# Patient Record
Sex: MALE | Race: WHITE | NOT HISPANIC OR LATINO | Employment: PART TIME | URBAN - METROPOLITAN AREA
[De-identification: names, ages, dates, MRNs, and addresses within clinical notes are randomized per-mention and may not be internally consistent; named-entity substitution may affect disease eponyms.]

---

## 2017-04-11 PROBLEM — I07.1 TRICUSPID VALVE INSUFFICIENCY: Status: ACTIVE | Noted: 2017-04-11

## 2018-07-11 PROBLEM — I07.1 TRICUSPID VALVE INSUFFICIENCY: Status: RESOLVED | Noted: 2017-04-11 | Resolved: 2018-07-11

## 2018-07-11 PROBLEM — E66.01 SEVERE OBESITY (BMI 35.0-39.9): Status: ACTIVE | Noted: 2018-07-11

## 2018-08-02 PROBLEM — I10 HYPERTENSION: Status: ACTIVE | Noted: 2018-08-02

## 2019-01-11 PROBLEM — I20.89 EQUIVALENT ANGINA: Status: ACTIVE | Noted: 2019-01-11

## 2019-01-11 PROBLEM — I36.1 NON-RHEUMATIC TRICUSPID VALVE INSUFFICIENCY: Status: ACTIVE | Noted: 2017-04-11

## 2019-01-11 PROBLEM — Z01.818 PRE-OP EVALUATION: Status: ACTIVE | Noted: 2019-01-11

## 2019-01-11 PROBLEM — R00.1 BRADYCARDIA: Status: ACTIVE | Noted: 2019-01-11

## 2019-03-14 PROBLEM — H26.9 CATARACT: Status: ACTIVE | Noted: 2019-03-14

## 2019-03-25 PROBLEM — I10 HYPERTENSION: Chronic | Status: ACTIVE | Noted: 2018-08-02

## 2019-03-25 PROBLEM — Z01.818 PRE-OP EVALUATION: Status: RESOLVED | Noted: 2019-01-11 | Resolved: 2019-03-25

## 2019-03-26 PROBLEM — H25.813 COMBINED FORMS OF AGE-RELATED CATARACT OF BOTH EYES: Status: ACTIVE | Noted: 2019-03-26

## 2019-04-24 ENCOUNTER — PATIENT OUTREACH (OUTPATIENT)
Dept: ADMINISTRATIVE | Facility: HOSPITAL | Age: 64
End: 2019-04-24

## 2019-05-07 PROBLEM — Z98.42 HISTORY OF LEFT CATARACT EXTRACTION: Status: ACTIVE | Noted: 2019-05-07

## 2019-08-28 PROBLEM — L98.9 UNKNOWN SKIN LESION: Status: ACTIVE | Noted: 2019-08-28

## 2019-08-28 PROBLEM — E80.1 PCT (PORPHYRIA CUTANEA TARDA): Status: ACTIVE | Noted: 2019-08-28

## 2019-08-28 PROBLEM — B35.1 ONYCHOMYCOSIS DUE TO DERMATOPHYTE: Status: ACTIVE | Noted: 2019-08-28

## 2020-01-03 ENCOUNTER — OFFICE VISIT (OUTPATIENT)
Dept: FAMILY MEDICINE | Facility: CLINIC | Age: 65
End: 2020-01-03
Payer: MEDICARE

## 2020-01-03 VITALS
RESPIRATION RATE: 18 BRPM | TEMPERATURE: 98 F | WEIGHT: 265.63 LBS | DIASTOLIC BLOOD PRESSURE: 78 MMHG | OXYGEN SATURATION: 97 % | SYSTOLIC BLOOD PRESSURE: 120 MMHG | BODY MASS INDEX: 39.34 KG/M2 | HEART RATE: 63 BPM | HEIGHT: 69 IN

## 2020-01-03 DIAGNOSIS — H91.93 BILATERAL HEARING LOSS, UNSPECIFIED HEARING LOSS TYPE: ICD-10-CM

## 2020-01-03 DIAGNOSIS — E78.00 PURE HYPERCHOLESTEROLEMIA: ICD-10-CM

## 2020-01-03 DIAGNOSIS — Z12.5 SCREENING FOR PROSTATE CANCER: ICD-10-CM

## 2020-01-03 DIAGNOSIS — J45.20 MILD INTERMITTENT ASTHMA WITHOUT COMPLICATION: ICD-10-CM

## 2020-01-03 DIAGNOSIS — R73.03 PREDIABETES: ICD-10-CM

## 2020-01-03 DIAGNOSIS — I25.10 CORONARY ARTERY DISEASE INVOLVING NATIVE CORONARY ARTERY OF NATIVE HEART WITHOUT ANGINA PECTORIS: ICD-10-CM

## 2020-01-03 DIAGNOSIS — E80.1 PCT (PORPHYRIA CUTANEA TARDA): ICD-10-CM

## 2020-01-03 DIAGNOSIS — E66.01 SEVERE OBESITY WITH BODY MASS INDEX (BMI) OF 35.0 TO 39.9 WITH SERIOUS COMORBIDITY: ICD-10-CM

## 2020-01-03 DIAGNOSIS — I10 ESSENTIAL HYPERTENSION: Primary | ICD-10-CM

## 2020-01-03 PROBLEM — R00.1 BRADYCARDIA: Status: RESOLVED | Noted: 2019-01-11 | Resolved: 2020-01-03

## 2020-01-03 PROBLEM — H25.813 COMBINED FORMS OF AGE-RELATED CATARACT OF BOTH EYES: Status: RESOLVED | Noted: 2019-03-26 | Resolved: 2020-01-03

## 2020-01-03 PROBLEM — B35.1 ONYCHOMYCOSIS DUE TO DERMATOPHYTE: Status: RESOLVED | Noted: 2019-08-28 | Resolved: 2020-01-03

## 2020-01-03 PROBLEM — L98.9 SKIN LESION OF FACE: Status: RESOLVED | Noted: 2019-08-28 | Resolved: 2020-01-03

## 2020-01-03 PROBLEM — Z98.42 HISTORY OF LEFT CATARACT EXTRACTION: Status: RESOLVED | Noted: 2019-05-07 | Resolved: 2020-01-03

## 2020-01-03 PROBLEM — I20.89 EQUIVALENT ANGINA: Status: RESOLVED | Noted: 2019-01-11 | Resolved: 2020-01-03

## 2020-01-03 PROBLEM — H26.9 CATARACT: Status: RESOLVED | Noted: 2019-03-14 | Resolved: 2020-01-03

## 2020-01-03 PROCEDURE — 3078F PR MOST RECENT DIASTOLIC BLOOD PRESSURE < 80 MM HG: ICD-10-PCS | Mod: HCNC,CPTII,S$GLB, | Performed by: FAMILY MEDICINE

## 2020-01-03 PROCEDURE — 99204 OFFICE O/P NEW MOD 45 MIN: CPT | Mod: HCNC,S$GLB,, | Performed by: FAMILY MEDICINE

## 2020-01-03 PROCEDURE — 3074F SYST BP LT 130 MM HG: CPT | Mod: HCNC,CPTII,S$GLB, | Performed by: FAMILY MEDICINE

## 2020-01-03 PROCEDURE — 99999 PR PBB SHADOW E&M-EST. PATIENT-LVL V: CPT | Mod: PBBFAC,HCNC,, | Performed by: FAMILY MEDICINE

## 2020-01-03 PROCEDURE — 99499 UNLISTED E&M SERVICE: CPT | Mod: HCNC,S$GLB,, | Performed by: FAMILY MEDICINE

## 2020-01-03 PROCEDURE — 3008F PR BODY MASS INDEX (BMI) DOCUMENTED: ICD-10-PCS | Mod: HCNC,CPTII,S$GLB, | Performed by: FAMILY MEDICINE

## 2020-01-03 PROCEDURE — 3008F BODY MASS INDEX DOCD: CPT | Mod: HCNC,CPTII,S$GLB, | Performed by: FAMILY MEDICINE

## 2020-01-03 PROCEDURE — 3074F PR MOST RECENT SYSTOLIC BLOOD PRESSURE < 130 MM HG: ICD-10-PCS | Mod: HCNC,CPTII,S$GLB, | Performed by: FAMILY MEDICINE

## 2020-01-03 PROCEDURE — 3078F DIAST BP <80 MM HG: CPT | Mod: HCNC,CPTII,S$GLB, | Performed by: FAMILY MEDICINE

## 2020-01-03 PROCEDURE — 99999 PR PBB SHADOW E&M-EST. PATIENT-LVL V: ICD-10-PCS | Mod: PBBFAC,HCNC,, | Performed by: FAMILY MEDICINE

## 2020-01-03 PROCEDURE — 99204 PR OFFICE/OUTPT VISIT, NEW, LEVL IV, 45-59 MIN: ICD-10-PCS | Mod: HCNC,S$GLB,, | Performed by: FAMILY MEDICINE

## 2020-01-03 PROCEDURE — 99499 RISK ADDL DX/OHS AUDIT: ICD-10-PCS | Mod: HCNC,S$GLB,, | Performed by: FAMILY MEDICINE

## 2020-01-03 RX ORDER — ATORVASTATIN CALCIUM 80 MG/1
80 TABLET, FILM COATED ORAL NIGHTLY
Qty: 90 TABLET | Refills: 3 | Status: SHIPPED | OUTPATIENT
Start: 2020-01-03 | End: 2020-02-21 | Stop reason: SDUPTHER

## 2020-01-03 RX ORDER — LISINOPRIL AND HYDROCHLOROTHIAZIDE 20; 25 MG/1; MG/1
1 TABLET ORAL DAILY
Qty: 90 TABLET | Refills: 1 | Status: SHIPPED | OUTPATIENT
Start: 2020-01-03 | End: 2020-02-21 | Stop reason: SDUPTHER

## 2020-01-03 NOTE — PROGRESS NOTES
FAMILY MEDICINE    Patient Active Problem List   Diagnosis    Pure hypercholesterolemia    Mild intermittent asthma without complication    GERD (gastroesophageal reflux disease)    Coronary artery disease involving native coronary artery without angina pectoris    Allergic rhinitis    Non-rheumatic tricuspid valve insufficiency    Severe obesity with body mass index (BMI) of 35.0 to 39.9 with serious comorbidity    Essential hypertension    PCT (porphyria cutanea tarda)    Prediabetes    Bilateral hearing loss       CC:   Chief Complaint   Patient presents with    Establish Care       HPI: Jean Shaw Jr. is a 64 y.o. male  - with obesity, CAD (2014 angiogram proximal LAD 20% and LCA OM1 70% stenosis), hypertension, hyperlipidemia, tricuspid regurgitation, asthma, and porphyria cutanea tarda presents to establish care. Last PCP NP Jojo Callaway    Cardiologist: Dr. Berhane Alonso   - reports planning to establish with Dr. Alvarez    1. Hypertension    Current medication treatment:   lisinopril-hydrochlorothiazide (PRINZIDE,ZESTORETIC) 20-25 mg Tab, TAKE 1 TABLET BY MOUTH ONCE DAILY, Disp: 90 tablet, Rfl: 3    Medication side effects: denies  Exercise regimen: none  Dietary treatment: poor compliance    Home BP cuff: none   How often does patient monitoring BP? NA  Last home BP reading: NA     Last 14 day average of home BP reading: NA    2. Hyperlipidemia with CAD LDL goal <70    Current treatment:  atorvastatin (LIPITOR) 80 MG tablet, Take 1 tablet (80 mg total) by mouth every evening., Disp: 90 tablet, Rfl: 3    Side effects from current treatment: denies    Exercise: see above  Diet: see above    Lab Results       Component                Value               Date                       CHOL                     125                 05/06/2019                 CHOL                     109 (L)             01/07/2019                 CHOL                     118 (L)             07/26/2018            Lab  Results       Component                Value               Date                       HDL                      39 (L)              05/06/2019                 HDL                      34 (L)              01/07/2019                 HDL                      37 (L)              07/26/2018            Lab Results       Component                Value               Date                       LDLCALC                  70.8                05/06/2019                 LDLCALC                  63.4                01/07/2019                 LDLCALC                  65.8                07/26/2018            Lab Results       Component                Value               Date                       TRIG                     76                  05/06/2019                 TRIG                     58                  01/07/2019                 TRIG                     76                  07/26/2018            Lab Results       Component                Value               Date                       CHOLHDL                  31.2                05/06/2019                 CHOLHDL                  31.2                01/07/2019                 CHOLHDL                  31.4                07/26/2018                      HEALTH MAINTENANCE:   Health Maintenance   Topic Date Due    PROSTATE-SPECIFIC ANTIGEN  01/07/2020    Lipid Panel  05/06/2020    High Dose Statin  01/03/2021    Aspirin/Antiplatelet Therapy  01/03/2021    Colonoscopy  07/01/2023    TETANUS VACCINE  05/09/2026    Hepatitis C Screening  Completed    Pneumococcal Vaccine (Medium Risk)  Completed       ROS: Review of Systems   Constitutional: Negative.    HENT: Hearing loss: chronic.         Otherwise negative   Eyes: Negative.    Respiratory: Negative.    Cardiovascular: Negative.    Gastrointestinal: Negative.    Endocrine: Negative.    Genitourinary: Negative.    Musculoskeletal: Negative.    Skin: Negative.    Allergic/Immunologic: Negative.    Neurological: Negative.     Hematological: Negative.    Psychiatric/Behavioral: Negative.        ALLERGIES:   Review of patient's allergies indicates:  No Known Allergies    MEDS:     Current Outpatient Medications:     albuterol (PROVENTIL/VENTOLIN HFA) 90 mcg/actuation inhaler, Inhale 2 puffs into the lungs every 6 (six) hours as needed for Wheezing., Disp: 18 g, Rfl: 5    aspirin 81 MG Chew, Take 1 tablet (81 mg total) by mouth once daily., Disp: 1 tablet, Rfl: 0    omeprazole (PRILOSEC) 20 MG capsule, Take 20 mg by mouth once daily., Disp: , Rfl:     triamcinolone (NASACORT) 55 mcg nasal inhaler, 2 sprays by Nasal route 2 (two) times daily., Disp: , Rfl:     atorvastatin (LIPITOR) 80 MG tablet, Take 1 tablet (80 mg total) by mouth every evening., Disp: 90 tablet, Rfl: 3    lisinopril-hydrochlorothiazide (PRINZIDE,ZESTORETIC) 20-25 mg Tab, Take 1 tablet by mouth once daily., Disp: 90 tablet, Rfl: 1    varicella-zoster gE-AS01B, PF, (SHINGRIX, PF,) 50 mcg/0.5 mL injection, Inject 0.5mL IM at 0 and 2-6 months, Disp: 0.5 mL, Rfl: 1    Past Medical History:   Diagnosis Date    Allergy     Asthma     Cataract 3/14/2019    Scheduled for left cataract extraction on 03/26/2019    Combined forms of age-related cataract of both eyes 3/26/2019    Fever blister     GERD (gastroesophageal reflux disease)     History of left cataract extraction 5/7/2019    Hyperlipidemia     Hypertension     Nasal polyps 1/15/2015    Onychomycosis due to dermatophyte 8/28/2019       Past Surgical History:   Procedure Laterality Date    CARDIAC CATHETERIZATION  10/23/14    CATARACT EXTRACTION W/  INTRAOCULAR LENS IMPLANT Left 3/26/2019    Procedure: EXTRACTION, CATARACT, WITH IOL INSERTION;  Surgeon: Wilmar Jo MD;  Location: [facility];  Service: Ophthalmology;  Laterality: Left;    COLONOSCOPY      HERNIA REPAIR  2011    HERNIA REPAIR      HERNIA REPAIR      SKIN BIOPSY         Family History   Problem Relation Age of Onset     "Osteoporosis Mother     Seizures Mother     Dementia Mother     Stroke Mother     Stroke Father     Diabetes Sister     No Known Problems Brother     Stroke Maternal Grandfather     No Known Problems Sister        Social History     Tobacco Use    Smoking status: Never Smoker    Smokeless tobacco: Never Used   Substance Use Topics    Alcohol use: Yes     Alcohol/week: 0.0 standard drinks     Frequency: Never     Binge frequency: Never     Comment: occ wine    Drug use: No       Social History     Social History Narrative    . No children. Self-employed and does construction work. Lives alone       OBJECTIVE:   Vitals:    01/03/20 0926   BP: 120/78   BP Location: Left arm   Patient Position: Sitting   BP Method: Large (Manual)   Pulse: 63   Resp: 18   Temp: 98.1 °F (36.7 °C)   TempSrc: Oral   SpO2: 97%   Weight: 120.5 kg (265 lb 9.6 oz)   Height: 5' 8.5" (1.74 m)     Body mass index is 39.8 kg/m².    Physical Exam   Constitutional: He is oriented to person, place, and time. No distress.   HENT:   Head: Normocephalic and atraumatic.   Right Ear: Tympanic membrane and ear canal normal.   Left Ear: Tympanic membrane and ear canal normal.   Nose: Nose normal.   Mouth/Throat: Uvula is midline, oropharynx is clear and moist and mucous membranes are normal.   Eyes: Pupils are equal, round, and reactive to light. Conjunctivae and EOM are normal. No scleral icterus.   Neck: Trachea normal. Neck supple. Normal carotid pulses and no JVD present. Carotid bruit is not present. No thyromegaly present.   Cardiovascular: Normal rate, regular rhythm, normal heart sounds and intact distal pulses. Exam reveals no gallop and no friction rub.   No murmur heard.  Pulmonary/Chest: Effort normal and breath sounds normal. He has no decreased breath sounds. He has no wheezes. He has no rhonchi. He has no rales.   Abdominal: Soft. Normal appearance and bowel sounds are normal. There is no tenderness.   Musculoskeletal: He " exhibits no edema.   Neurological: He is alert and oriented to person, place, and time.   Skin: Skin is warm. Capillary refill takes less than 2 seconds. No cyanosis. Nails show no clubbing.         Depression Patient Health Questionnaire 1/3/2020 5/8/2019 1/11/2019 8/2/2018 2/23/2018 11/6/2017 3/22/2017   Over the last two weeks how often have you been bothered by little interest or pleasure in doing things 0 0 0 0 0 0 0   Over the last two weeks how often have you been bothered by feeling down, depressed or hopeless 0 0 - 0 0 0 0   PHQ-2 Total Score 0 0 - 0 0 0 0       PERTINENT RESULTS:   10/23/14   Cath lab procedure   C. ANGIOGRAPHIC RESULTS:    DIAGNOSTIC:       Patient has a left dominant coronary artery.        - Left Main Coronary Artery:             The ostial LM is normal. There is MARGARETTE 3 flow.       - Left Anterior Descending Artery:             The proximal LAD has a 20% stenosis. There is MARGARETTE 3 flow. The remaining portion of the vessel has luminal irregularities.       - D1:             The D1 is normal. There is MARGARETTE 3 flow.       - D2:             The D2 is normal. There is MARGARETTE 3 flow.       - Left Circumflex Artery:             The LCX has luminal irregularities. There is MARGARETTE 3 flow.       - OM1:             The OM1 has a 70% stenosis. There is MARGARETTE 3 flow. The remaining portion of the vessel has luminal irregularities.       - Right Coronary Artery:             The mid RCA has a 30% stenosis. There is MARGARETTE 3 flow. The remaining portion of the vessel has luminal irregularities.       - Posterior Lateral Branch:             The PLB has luminal irregularities. There is MARGARETTE 3 flow.       - Posterior Descending Artery:             The PDA has luminal irregularities. There is MARGARETTE 3 flow. The remaining portion of the vessel is of small caliber.      D. SUMMARY:    1. LVEDP=7mmHg  2. 70% OM1 stenosis    E. RECOMMENDATIONS:    1. Routine post-cath care.  2. Maximize medical secondary prevention of CAD.    3. Risk factor reduction.   4. EC ASA 81mg po qday  5. Follow-up with Dr. JAMIL Alonso in 2-4 weeks    I certify that I was present for catheter insertion, catheter manipulation, angiography, and angiographic interpretation of this patient.    This document has been electronically    SIGNED BY: Ham Arizmendi MD On: 10/23/2014 12:21       BMP  Lab Results   Component Value Date     09/05/2019    K 5.0 09/05/2019    CL 98 09/05/2019    CO2 28 09/05/2019    BUN 16 09/05/2019    CREATININE 0.9 09/05/2019    CALCIUM 10.1 09/05/2019    ANIONGAP 10 09/05/2019    ESTGFRAFRICA >60.0 09/05/2019    EGFRNONAA >60.0 09/05/2019     Lab Results   Component Value Date    ALT 40 09/05/2019    AST 27 09/05/2019    ALKPHOS 117 09/05/2019    BILITOT 0.6 09/05/2019     Lab Results   Component Value Date    HGBA1C 5.8 (H) 09/05/2019     Lab Results   Component Value Date    CHOL 125 05/06/2019    CHOL 109 (L) 01/07/2019    CHOL 118 (L) 07/26/2018     Lab Results   Component Value Date    HDL 39 (L) 05/06/2019    HDL 34 (L) 01/07/2019    HDL 37 (L) 07/26/2018     Lab Results   Component Value Date    LDLCALC 70.8 05/06/2019    LDLCALC 63.4 01/07/2019    LDLCALC 65.8 07/26/2018     Lab Results   Component Value Date    TRIG 76 05/06/2019    TRIG 58 01/07/2019    TRIG 76 07/26/2018     Lab Results   Component Value Date    CHOLHDL 31.2 05/06/2019    CHOLHDL 31.2 01/07/2019    CHOLHDL 31.4 07/26/2018       ASSESSMENT/PLAN:  Problem List Items Addressed This Visit        ENT    Bilateral hearing loss    Current Assessment & Plan     - referral to Audiology for evaluation and recommendations         Relevant Orders    Ambulatory Referral to Audiology       Pulmonary    Mild intermittent asthma without complication    Current Assessment & Plan     - no signs of exacerbation            Cardiac/Vascular    Pure hypercholesterolemia    Current Assessment & Plan     Lab Results   Component Value Date    LDLCALC 70.8 05/06/2019     - well  controlled  - continue current medications         Relevant Medications    atorvastatin (LIPITOR) 80 MG tablet    Other Relevant Orders    Comprehensive metabolic panel    Lipid panel    Coronary artery disease involving native coronary artery without angina pectoris    Overview     - CCS: 0  - 10/23/14 Angiogram LVEDP=7mmHg, 70% OM1 stenosis,  RCA has a 30% stenosis, LAD 20% stenosis   - goal LDL <70 on high dose statin  - BP goal < 130/80  - ASA 81 mg daily  - not on Beta-blocker due to bradycardia         Essential hypertension - Primary    Current Assessment & Plan     - well controlled  - continue current medications         Relevant Medications    lisinopril-hydrochlorothiazide (PRINZIDE,ZESTORETIC) 20-25 mg Tab       Endocrine    PCT (porphyria cutanea tarda)    Current Assessment & Plan     - no issues  - followed by Dermatology         Prediabetes    Current Assessment & Plan     Lab Results   Component Value Date    HGBA1C 5.8 (H) 09/05/2019     - discussed recommendation for diet, exercise and weight loss         Relevant Orders    Hemoglobin A1c       Other    Severe obesity with body mass index (BMI) of 35.0 to 39.9 with serious comorbidity    Current Assessment & Plan     - discussed recommendation for diet, exercise and weight loss           Other Visit Diagnoses     Screening for prostate cancer        - pt opts for yearly PSA screening    Relevant Orders    PSA, Screening          ORDERS:   Orders Placed This Encounter    Comprehensive metabolic panel    Lipid panel    Hemoglobin A1c    PSA, Screening    Ambulatory Referral to Audiology    varicella-zoster gE-AS01B, PF, (SHINGRIX, PF,) 50 mcg/0.5 mL injection    lisinopril-hydrochlorothiazide (PRINZIDE,ZESTORETIC) 20-25 mg Tab    atorvastatin (LIPITOR) 80 MG tablet     Vaccines recommended: Shingles vaccine at pharmacy    Follow-up in 3 months with labs.     Dr. Anni Beth D.O.   Family Medicine

## 2020-01-03 NOTE — ASSESSMENT & PLAN NOTE
Lab Results   Component Value Date    HGBA1C 5.8 (H) 09/05/2019     - discussed recommendation for diet, exercise and weight loss

## 2020-01-03 NOTE — ASSESSMENT & PLAN NOTE
Lab Results   Component Value Date    LDLCALC 70.8 05/06/2019     - well controlled  - continue current medications

## 2020-01-08 ENCOUNTER — TELEPHONE (OUTPATIENT)
Dept: OTOLARYNGOLOGY | Facility: CLINIC | Age: 65
End: 2020-01-08

## 2020-01-08 ENCOUNTER — OFFICE VISIT (OUTPATIENT)
Dept: CARDIOLOGY | Facility: CLINIC | Age: 65
End: 2020-01-08
Payer: MEDICARE

## 2020-01-08 VITALS
OXYGEN SATURATION: 98 % | SYSTOLIC BLOOD PRESSURE: 144 MMHG | HEIGHT: 68 IN | DIASTOLIC BLOOD PRESSURE: 80 MMHG | WEIGHT: 265 LBS | HEART RATE: 62 BPM | BODY MASS INDEX: 40.16 KG/M2

## 2020-01-08 DIAGNOSIS — R07.89 ATYPICAL CHEST PAIN: ICD-10-CM

## 2020-01-08 DIAGNOSIS — E78.00 PURE HYPERCHOLESTEROLEMIA: ICD-10-CM

## 2020-01-08 DIAGNOSIS — I25.10 CORONARY ARTERY DISEASE INVOLVING NATIVE CORONARY ARTERY OF NATIVE HEART WITHOUT ANGINA PECTORIS: ICD-10-CM

## 2020-01-08 DIAGNOSIS — I10 ESSENTIAL HYPERTENSION: ICD-10-CM

## 2020-01-08 DIAGNOSIS — R07.89 CHEST DISCOMFORT: ICD-10-CM

## 2020-01-08 DIAGNOSIS — E66.01 SEVERE OBESITY WITH BODY MASS INDEX (BMI) OF 35.0 TO 39.9 WITH SERIOUS COMORBIDITY: ICD-10-CM

## 2020-01-08 PROCEDURE — 3008F PR BODY MASS INDEX (BMI) DOCUMENTED: ICD-10-PCS | Mod: HCNC,CPTII,S$GLB, | Performed by: INTERNAL MEDICINE

## 2020-01-08 PROCEDURE — 99214 PR OFFICE/OUTPT VISIT, EST, LEVL IV, 30-39 MIN: ICD-10-PCS | Mod: HCNC,S$GLB,, | Performed by: INTERNAL MEDICINE

## 2020-01-08 PROCEDURE — 3077F SYST BP >= 140 MM HG: CPT | Mod: HCNC,CPTII,S$GLB, | Performed by: INTERNAL MEDICINE

## 2020-01-08 PROCEDURE — 3077F PR MOST RECENT SYSTOLIC BLOOD PRESSURE >= 140 MM HG: ICD-10-PCS | Mod: HCNC,CPTII,S$GLB, | Performed by: INTERNAL MEDICINE

## 2020-01-08 PROCEDURE — 3079F PR MOST RECENT DIASTOLIC BLOOD PRESSURE 80-89 MM HG: ICD-10-PCS | Mod: HCNC,CPTII,S$GLB, | Performed by: INTERNAL MEDICINE

## 2020-01-08 PROCEDURE — 93005 EKG 12-LEAD: ICD-10-PCS | Mod: HCNC,S$GLB,, | Performed by: INTERNAL MEDICINE

## 2020-01-08 PROCEDURE — 99999 PR PBB SHADOW E&M-EST. PATIENT-LVL III: CPT | Mod: PBBFAC,HCNC,, | Performed by: INTERNAL MEDICINE

## 2020-01-08 PROCEDURE — 99214 OFFICE O/P EST MOD 30 MIN: CPT | Mod: HCNC,S$GLB,, | Performed by: INTERNAL MEDICINE

## 2020-01-08 PROCEDURE — 93005 ELECTROCARDIOGRAM TRACING: CPT | Mod: HCNC,S$GLB,, | Performed by: INTERNAL MEDICINE

## 2020-01-08 PROCEDURE — 3079F DIAST BP 80-89 MM HG: CPT | Mod: HCNC,CPTII,S$GLB, | Performed by: INTERNAL MEDICINE

## 2020-01-08 PROCEDURE — 93010 ELECTROCARDIOGRAM REPORT: CPT | Mod: HCNC,S$GLB,, | Performed by: STUDENT IN AN ORGANIZED HEALTH CARE EDUCATION/TRAINING PROGRAM

## 2020-01-08 PROCEDURE — 93010 EKG 12-LEAD: ICD-10-PCS | Mod: HCNC,S$GLB,, | Performed by: STUDENT IN AN ORGANIZED HEALTH CARE EDUCATION/TRAINING PROGRAM

## 2020-01-08 PROCEDURE — 3008F BODY MASS INDEX DOCD: CPT | Mod: HCNC,CPTII,S$GLB, | Performed by: INTERNAL MEDICINE

## 2020-01-08 PROCEDURE — 99999 PR PBB SHADOW E&M-EST. PATIENT-LVL III: ICD-10-PCS | Mod: PBBFAC,HCNC,, | Performed by: INTERNAL MEDICINE

## 2020-01-08 NOTE — ASSESSMENT & PLAN NOTE
Controlled.  Pt on high intensity statin.  Goal LDL < 70, last LDL 70.8.  - continue medical therapy   - encouraged risk factor and lifestyle modifications

## 2020-01-08 NOTE — ASSESSMENT & PLAN NOTE
BMI 40.  Pt understands health complications a/w obesity and desires to lose weight.    - encouraged increased activity and exercise [id number]/day 5x/week and improved diet (portion control) for weight loss

## 2020-01-08 NOTE — ASSESSMENT & PLAN NOTE
CCS 0.  Pt w/ NYHA Class I functional status.  Compliant w/ medical therapy.  Angiogram in 10/2014 w/ OM1 70%, prox LAD 20% stenosis, and RCA 30% stenosis.    - continue medical therapy for secondary prevention  - encouraged risk factor and lifestyle modifications

## 2020-01-08 NOTE — ASSESSMENT & PLAN NOTE
Pain nonexertional.  Pt not on antianginal however pain is reproducible and pt notes past trauma which he attributes to the current pain.  Most likely musculoskeletal.   - will continue to follow clinically

## 2020-01-08 NOTE — TELEPHONE ENCOUNTER
----- Message from Eileen Parham sent at 1/8/2020  3:42 PM CST -----  Please make patient an appt for hearing test only. Orders placed by Dr. Beth. Thanks

## 2020-01-08 NOTE — ASSESSMENT & PLAN NOTE
Elevated in clinic.  Pt compliant w/ meds.  Goal BP < 130/80.  Pt does not monitor at home.   - continue medical therapy  - pt to monitor BP at home and record, to bring in record to cardiology and PCP appts  - encouraged risk factor and lifestyle modifications

## 2020-01-08 NOTE — PROGRESS NOTES
Subjective:    Patient ID:  Jean Shaw Jr. is a 64 y.o. male who presents for evaluation of Establish Care (chest discomfort)      PCP/Referring: Anni Beth DO     Prior Cardiologist: Dr. Berhane Alonso    HPI  Pt is a 65 yo M w/ PMH of CAD w/ (OM1 70% stenosis per Dr. Arizmendi 10/2014), HTN, HLD, Morbid Obesity w/ BMI 40, and prediabetes w/ hgba1c of 5.8% who presents to [facility] care.  He mentions that he has switched from Dr. Alonso to obtain a cardiologist closer to his home.  He mentions that he last saw Dr. Alonso 1/11/19 for his CAD and was continued on medical therapy for secondary prevention.  He mentions that he has been doing well since his last appt.  He mentions that at times he has some chest pain at night x multiple years which is worsened w/ palpation however not a/w sob nor cp w/ exertion.  He attributes this to a prior injury from when he tore cartilage in his chest.  He denies sob, edema, orthopnea, PND, presyncope, palpitations, LOC, or claudication.  He is compliant w/ meds and denies side effects.  He does not monitor his BP at home but has a BP cuff.  He does not exercise much but mentions that he is active at work by walking a lot and gardening and is only limited by chronic back pain.       Past Medical History:   Diagnosis Date    Allergy     Asthma     Cataract 3/14/2019    Scheduled for left cataract extraction on 03/26/2019    Combined forms of age-related cataract of both eyes 3/26/2019    Fever blister     GERD (gastroesophageal reflux disease)     History of left cataract extraction 5/7/2019    Hyperlipidemia     Hypertension     Nasal polyps 1/15/2015    Onychomycosis due to dermatophyte 8/28/2019     Past Surgical History:   Procedure Laterality Date    CARDIAC CATHETERIZATION  10/23/14    CATARACT EXTRACTION W/  INTRAOCULAR LENS IMPLANT Left 3/26/2019    Procedure: EXTRACTION, CATARACT, WITH IOL INSERTION;  Surgeon: Wilmar Jo MD;  Location: [facility];   Service: Ophthalmology;  Laterality: Left;    COLONOSCOPY      HERNIA REPAIR  2011    HERNIA REPAIR      HERNIA REPAIR      SKIN BIOPSY       Social History     Socioeconomic History    Marital status:      Spouse name: Not on file    Number of children: Not on file    Years of education: Not on file    Highest education level: Not on file   Occupational History    Not on file   Social Needs    Financial resource strain: Not very hard    Food insecurity:     Worry: Never true     Inability: Never true    Transportation needs:     Medical: No     Non-medical: No   Tobacco Use    Smoking status: Never Smoker    Smokeless tobacco: Never Used   Substance and Sexual Activity    Alcohol use: Yes     Alcohol/week: 0.0 standard drinks     Frequency: Never     Binge frequency: Never     Comment: occ wine    Drug use: No    Sexual activity: Not Currently     Partners: Female   Lifestyle    Physical activity:     Days per week: 0 days     Minutes per session: 0 min    Stress: Not at all   Relationships    Social connections:     Talks on phone: More than three times a week     Gets together: Once a week     Attends [employer] service: Not on file     Active member of club or organization: No     Attends meetings of clubs or organizations: Never     Relationship status:    Other Topics Concern    Not on file   Social History Narrative    . No children. Self-employed and does construction work. Lives alone     Family History   Problem Relation Age of Onset    Osteoporosis Mother     Seizures Mother     Dementia Mother     Stroke Mother     Stroke Father     Diabetes Sister     No Known Problems Brother     Stroke Maternal Grandfather     No Known Problems Sister        Review of patient's allergies indicates:  No Known Allergies    Medication List with Changes/Refills   Current Medications    ALBUTEROL (PROVENTIL/VENTOLIN HFA) 90 MCG/ACTUATION INHALER    Inhale 2 puffs into  "the lungs every 6 (six) hours as needed for Wheezing.    ASPIRIN 81 MG CHEW    Take 1 tablet (81 mg total) by mouth once daily.    ATORVASTATIN (LIPITOR) 80 MG TABLET    Take 1 tablet (80 mg total) by mouth every evening.    LISINOPRIL-HYDROCHLOROTHIAZIDE (PRINZIDE,ZESTORETIC) 20-25 MG TAB    Take 1 tablet by mouth once daily.    OMEPRAZOLE (PRILOSEC) 20 MG CAPSULE    Take 20 mg by mouth once daily.    TRIAMCINOLONE (NASACORT) 55 MCG NASAL INHALER    2 sprays by Nasal route 2 (two) times daily.    VARICELLA-ZOSTER GE-AS01B, PF, (SHINGRIX, PF,) 50 MCG/0.5 ML INJECTION    Inject 0.5mL IM at 0 and 2-6 months       Review of Systems   Constitution: Negative for diaphoresis and fever.   HENT: Negative for congestion and hearing loss.    Eyes: Negative for blurred vision and pain.   Cardiovascular: Positive for chest pain. Negative for claudication, dyspnea on exertion, leg swelling, near-syncope, palpitations and syncope.   Respiratory: Negative for shortness of breath and sleep disturbances due to breathing.    Hematologic/Lymphatic: Negative for bleeding problem. Does not bruise/bleed easily.   Skin: Negative for color change and poor wound healing.   Musculoskeletal: Positive for back pain.   Gastrointestinal: Negative for abdominal pain and nausea.   Genitourinary: Negative for bladder incontinence and flank pain.   Neurological: Negative for focal weakness and light-headedness.        Objective:   BP (!) 144/80 (BP Location: Left arm, Patient Position: Sitting, BP Method: X-Large (Manual))   Pulse 62   Ht 5' 8" (1.727 m)   Wt 120.2 kg (265 lb)   SpO2 98%   BMI 40.29 kg/m²    Physical Exam   Constitutional: He is oriented to person, place, and time. He appears well-developed and well-nourished.   HENT:   Head: Normocephalic and atraumatic.   Mouth/Throat: Oropharynx is clear and moist.   Eyes: Pupils are equal, round, and reactive to light. EOM are normal. No scleral icterus.   Neck: Normal range of motion. Neck " supple. No JVD present.   Cardiovascular: Normal rate, regular rhythm, S1 normal, S2 normal and intact distal pulses. Exam reveals no gallop and no friction rub.   No murmur heard.  Pulmonary/Chest: Effort normal and breath sounds normal. No respiratory distress. He has no wheezes. He has no rales. He exhibits tenderness.   L chest wall TTP   Abdominal: Soft. Bowel sounds are normal. He exhibits no distension and no mass. There is no tenderness. There is no rebound.   obese   Musculoskeletal: Normal range of motion. He exhibits no edema or tenderness.   Neurological: He is alert and oriented to person, place, and time. He displays normal reflexes. Coordination normal.   Skin: Skin is warm and dry. He is not diaphoretic. No pallor.   Psychiatric: He has a normal mood and affect. His behavior is normal. Judgment normal.         EC2020- reviewed.  Sinus bradycardia, LAD, possible LVH.    Assessment:       1. Coronary artery disease involving native coronary artery of native heart without angina pectoris    2. Essential hypertension    3. Pure hypercholesterolemia    4. Severe obesity with body mass index (BMI) of 35.0 to 39.9 with serious comorbidity    5. Atypical chest pain    6. Chest discomfort         Plan:         Coronary artery disease involving native coronary artery without angina pectoris  CCS 0.  Pt w/ NYHA Class I functional status.  Compliant w/ medical therapy.  Angiogram in 10/2014 w/ OM1 70%, prox LAD 20% stenosis, and RCA 30% stenosis.    - continue medical therapy for secondary prevention  - encouraged risk factor and lifestyle modifications     Essential hypertension  Elevated in clinic.  Pt compliant w/ meds.  Goal BP < 130/80.  Pt does not monitor at home.   - continue medical therapy  - pt to monitor BP at home and record, to bring in record to cardiology and PCP appts  - encouraged risk factor and lifestyle modifications    Pure hypercholesterolemia  Controlled.  Pt on high intensity  statin.  Goal LDL < 70, last LDL 70.8.  - continue medical therapy   - encouraged risk factor and lifestyle modifications    Severe obesity with body mass index (BMI) of 35.0 to 39.9 with serious comorbidity  BMI 40.  Pt understands health complications a/w obesity and desires to lose weight.    - encouraged increased activity and exercise t78ritv/day 5x/week and improved diet (portion control) for weight loss    Atypical chest pain  Pain nonexertional.  Pt not on antianginal however pain is reproducible and pt notes past trauma which he attributes to the current pain.  Most likely musculoskeletal.   - will continue to follow clinically       Total duration of face to face visit time 30 minutes.  Total time spent counseling greater than fifty percent of total visit time.  Counseling included discussion regarding imaging findings, diagnosis, possibilities, treatment options, risks and benefits.  The patient had many questions regarding the options and long-term effects      Lamont Byers M.D.  Interventional Cardiology

## 2020-01-09 ENCOUNTER — TELEPHONE (OUTPATIENT)
Dept: OTOLARYNGOLOGY | Facility: CLINIC | Age: 65
End: 2020-01-09

## 2020-01-09 NOTE — TELEPHONE ENCOUNTER
----- Message from BENTLEY Rhodes sent at 1/9/2020  8:55 AM CST -----  Schedule for audio only.  Thanks.  ----- Message -----  From: Sahra Brandt LPN  Sent: 1/8/2020   4:23 PM CST  To: BENTLEY Rhodes,  Sending this to you. It says audio only just wanted to double check with you first.  ----- Message -----  From: Eileen Parham  Sent: 1/8/2020   3:42 PM CST  To: [facility] Staff    Please make patient an appt for hearing test only. Orders placed by Dr. Beth. Thanks

## 2020-01-09 NOTE — TELEPHONE ENCOUNTER
Spoke to patient scheduled an appt due to hearing loss with Audiologist on 1/21 at 10:40 AM. Patient was notified of the date and time

## 2020-01-21 ENCOUNTER — CLINICAL SUPPORT (OUTPATIENT)
Dept: OTOLARYNGOLOGY | Facility: CLINIC | Age: 65
End: 2020-01-21
Payer: MEDICARE

## 2020-01-21 DIAGNOSIS — H93.12 TINNITUS, LEFT: ICD-10-CM

## 2020-01-21 DIAGNOSIS — H90.3 SENSORINEURAL HEARING LOSS (SNHL), BILATERAL: Primary | ICD-10-CM

## 2020-01-21 PROCEDURE — 92557 COMPREHENSIVE HEARING TEST: CPT | Mod: HCNC,S$GLB,, | Performed by: AUDIOLOGIST-HEARING AID FITTER

## 2020-01-21 PROCEDURE — 92567 TYMPANOMETRY: CPT | Mod: HCNC,S$GLB,, | Performed by: AUDIOLOGIST-HEARING AID FITTER

## 2020-01-21 PROCEDURE — 92567 PR TYMPA2METRY: ICD-10-PCS | Mod: HCNC,S$GLB,, | Performed by: AUDIOLOGIST-HEARING AID FITTER

## 2020-01-21 PROCEDURE — 92557 PR COMPREHENSIVE HEARING TEST: ICD-10-PCS | Mod: HCNC,S$GLB,, | Performed by: AUDIOLOGIST-HEARING AID FITTER

## 2020-02-21 DIAGNOSIS — E78.00 PURE HYPERCHOLESTEROLEMIA: ICD-10-CM

## 2020-02-21 DIAGNOSIS — I10 ESSENTIAL HYPERTENSION: ICD-10-CM

## 2020-02-21 RX ORDER — ATORVASTATIN CALCIUM 80 MG/1
80 TABLET, FILM COATED ORAL NIGHTLY
Qty: 90 TABLET | Refills: 3 | Status: SHIPPED | OUTPATIENT
Start: 2020-02-21 | End: 2020-07-08 | Stop reason: SDUPTHER

## 2020-02-21 RX ORDER — LISINOPRIL AND HYDROCHLOROTHIAZIDE 20; 25 MG/1; MG/1
1 TABLET ORAL DAILY
Qty: 90 TABLET | Refills: 1 | Status: SHIPPED | OUTPATIENT
Start: 2020-02-21 | End: 2020-07-08 | Stop reason: SDUPTHER

## 2020-02-21 NOTE — TELEPHONE ENCOUNTER
----- Message from Nitish Parham sent at 2/21/2020 12:18 PM CST -----  Contact: [facility]  [employer] called to check the status of the new prescription to be sent to them for the medications listed below.  The patient no longer receives these from his local pharamcy.    Please call [phone] to discuss today.    atorvastatin (LIPITOR) 80 MG tablet  lisinopril-hydrochlorothiazide (PRINZIDE,ZESTORETIC) 20-25 mg Tab

## 2020-03-19 ENCOUNTER — PATIENT OUTREACH (OUTPATIENT)
Dept: ADMINISTRATIVE | Facility: HOSPITAL | Age: 65
End: 2020-03-19

## 2020-03-26 ENCOUNTER — PATIENT MESSAGE (OUTPATIENT)
Dept: FAMILY MEDICINE | Facility: CLINIC | Age: 65
End: 2020-03-26

## 2020-03-26 DIAGNOSIS — K21.9 GASTROESOPHAGEAL REFLUX DISEASE WITHOUT ESOPHAGITIS: Primary | Chronic | ICD-10-CM

## 2020-03-26 DIAGNOSIS — K21.9 GASTROESOPHAGEAL REFLUX DISEASE WITHOUT ESOPHAGITIS: Chronic | ICD-10-CM

## 2020-03-26 RX ORDER — OMEPRAZOLE 20 MG/1
20 CAPSULE, DELAYED RELEASE ORAL DAILY
Qty: 90 CAPSULE | Refills: 1 | Status: SHIPPED | OUTPATIENT
Start: 2020-03-26 | End: 2020-04-01 | Stop reason: SDUPTHER

## 2020-04-01 RX ORDER — OMEPRAZOLE 20 MG/1
20 CAPSULE, DELAYED RELEASE ORAL DAILY
Qty: 90 CAPSULE | Refills: 3 | Status: SHIPPED | OUTPATIENT
Start: 2020-04-01 | End: 2021-03-28

## 2020-04-27 ENCOUNTER — PATIENT MESSAGE (OUTPATIENT)
Dept: FAMILY MEDICINE | Facility: CLINIC | Age: 65
End: 2020-04-27

## 2020-06-25 ENCOUNTER — PATIENT OUTREACH (OUTPATIENT)
Dept: ADMINISTRATIVE | Facility: HOSPITAL | Age: 65
End: 2020-06-25

## 2020-07-08 NOTE — ASSESSMENT & PLAN NOTE
Lab Results   Component Value Date    LDLCALC 69.4 07/02/2020     - well controlled  - continue current medication

## 2020-07-08 NOTE — PROGRESS NOTES
FAMILY MEDICINE    Patient Active Problem List   Diagnosis    Pure hypercholesterolemia    Mild intermittent asthma without complication    GERD (gastroesophageal reflux disease)    Coronary artery disease involving native coronary artery without angina pectoris    Allergic rhinitis    Non-rheumatic tricuspid valve insufficiency    Severe obesity with body mass index (BMI) of 35.0 to 39.9 with serious comorbidity    Essential hypertension    PCT (porphyria cutanea tarda)    Prediabetes    Bilateral hearing loss       CC:   Chief Complaint   Patient presents with    Follow-up     labs       HPI: Jean Shaw Jr. is a 65 y.o. male  - with obesity, CAD (2014 angiogram proximal LAD 20% and LCA OM1 70% stenosis), hypertension, hyperlipidemia, tricuspid regurgitation, asthma, and porphyria cutanea tarda presents to follow-up labs and refill medications    Cardiologist: Dr. Lamont Byers    1. Hypertension    Current medication treatment:   lisinopril-hydrochlorothiazide (PRINZIDE,ZESTORETIC) 20-25 mg Tab, TAKE 1 TABLET BY MOUTH ONCE DAILY, Disp: 90 tablet, Rfl: 3    Medication side effects: denies    Home BP cuff: none     2. Hyperlipidemia with CAD LDL goal <70    Current treatment:  atorvastatin (LIPITOR) 80 MG tablet, Take 1 tablet (80 mg total) by mouth every evening., Disp: 90 tablet, Rfl: 3    Side effects from current treatment: denies    Exercise: see above  Diet: see above    Lab Results       Component                Value               Date                       CHOL                     123                 07/02/2020                 CHOL                     125                 05/06/2019                 CHOL                     109 (L)             01/07/2019            Lab Results       Component                Value               Date                       HDL                      39 (L)              07/02/2020                 HDL                      39 (L)              05/06/2019                  HDL                      34 (L)              01/07/2019            Lab Results       Component                Value               Date                       LDLCALC                  69.4                07/02/2020                 LDLCALC                  70.8                05/06/2019                 LDLCALC                  63.4                01/07/2019            Lab Results       Component                Value               Date                       TRIG                     73                  07/02/2020                 TRIG                     76                  05/06/2019                 TRIG                     58                  01/07/2019            Lab Results       Component                Value               Date                       CHOLHDL                  31.7                07/02/2020                 CHOLHDL                  31.2                05/06/2019                 CHOLHDL                  31.2                01/07/2019                      HEALTH MAINTENANCE:   Health Maintenance   Topic Date Due    Pneumococcal Vaccine (65+ Low/Medium Risk) (1 of 2 - PCV13) 01/16/2020    PROSTATE-SPECIFIC ANTIGEN  07/02/2021    Lipid Panel  07/02/2021    High Dose Statin  07/09/2021    Aspirin/Antiplatelet Therapy  07/09/2021    TETANUS VACCINE  05/09/2026    Hepatitis C Screening  Completed       ROS: Review of Systems   Constitutional: Negative.    HENT: Hearing loss: chronic.         Otherwise negative   Eyes: Negative.    Respiratory: Negative.    Cardiovascular: Negative.    Gastrointestinal: Negative.    Endocrine: Negative.    Genitourinary: Negative.    Musculoskeletal: Negative.    Skin: Negative.    Allergic/Immunologic: Negative.    Neurological: Negative.    Hematological: Negative.    Psychiatric/Behavioral: Negative.        ALLERGIES:   Review of patient's allergies indicates:  No Known Allergies    MEDS:     Current Outpatient Medications:     albuterol (PROVENTIL/VENTOLIN HFA) 90  mcg/actuation inhaler, Inhale 2 puffs into the lungs every 6 (six) hours as needed for Wheezing., Disp: 18 g, Rfl: 5    aspirin 81 MG Chew, Take 1 tablet (81 mg total) by mouth once daily., Disp: 1 tablet, Rfl: 0    omeprazole (PRILOSEC) 20 MG capsule, Take 1 capsule (20 mg total) by mouth once daily., Disp: 90 capsule, Rfl: 3    triamcinolone (NASACORT) 55 mcg nasal inhaler, 2 sprays by Nasal route 2 (two) times daily., Disp: , Rfl:     varicella-zoster [id number], PF, (SHINGRIX, PF,) 50 mcg/0.5 mL injection, Inject 0.5mL IM at 0 and 2-6 months, Disp: 0.5 mL, Rfl: 1    atorvastatin (LIPITOR) 80 MG tablet, Take 1 tablet (80 mg total) by mouth every evening., Disp: 90 tablet, Rfl: 3    lisinopriL-hydrochlorothiazide (PRINZIDE,ZESTORETIC) 20-25 mg Tab, Take 1 tablet by mouth once daily., Disp: 90 tablet, Rfl: 3    Past Medical History:   Diagnosis Date    Allergy     Asthma     Cataract 3/14/2019    Scheduled for left cataract extraction on 03/26/2019    Combined forms of age-related cataract of both eyes 3/26/2019    Fever blister     GERD (gastroesophageal reflux disease)     History of left cataract extraction 5/7/2019    Hyperlipidemia     Hypertension     Nasal polyps 1/15/2015    Onychomycosis due to dermatophyte 8/28/2019       Past Surgical History:   Procedure Laterality Date    CARDIAC CATHETERIZATION  10/23/14    CATARACT EXTRACTION W/  INTRAOCULAR LENS IMPLANT Left 3/26/2019    Procedure: EXTRACTION, CATARACT, WITH IOL INSERTION;  Surgeon: Wilmar Jo MD;  Location: [facility];  Service: Ophthalmology;  Laterality: Left;    COLONOSCOPY      HERNIA REPAIR  2011    HERNIA REPAIR      HERNIA REPAIR      SKIN BIOPSY         Family History   Problem Relation Age of Onset    Osteoporosis Mother     Seizures Mother     Dementia Mother     Stroke Mother     Stroke Father     Diabetes Sister     No Known Problems Brother     Stroke Maternal Grandfather     No Known Problems  "Sister        Social History     Tobacco Use    Smoking status: Never Smoker    Smokeless tobacco: Never Used   Substance Use Topics    Alcohol use: Yes     Alcohol/week: 0.0 standard drinks     Frequency: Never     Binge frequency: Never     Comment: occ wine    Drug use: No       Social History     Social History Narrative    . No children. Self-employed and does construction work. Lives alone       OBJECTIVE:   Vitals:    07/09/20 1034   BP: 130/70   BP Location: Left arm   Patient Position: Sitting   BP Method: X-Large (Manual)   Pulse: 66   Resp: 18   Temp: 98.1 °F (36.7 °C)   TempSrc: Oral   SpO2: 97%   Weight: 118 kg (260 lb 3.2 oz)   Height: 5' 8" (1.727 m)     Body mass index is 39.56 kg/m².    Physical Exam  Constitutional:       General: He is not in acute distress.  Neck:      Musculoskeletal: Neck supple.      Vascular: No carotid bruit.   Cardiovascular:      Rate and Rhythm: Normal rate and regular rhythm.      Heart sounds: Normal heart sounds. No murmur.   Pulmonary:      Effort: Pulmonary effort is normal.      Breath sounds: Normal breath sounds.   Abdominal:      General: Abdomen is protuberant. Bowel sounds are normal. There is no distension.      Palpations: Abdomen is soft. There is no mass.      Tenderness: There is no abdominal tenderness.      Hernia: A hernia is present.       Musculoskeletal:      Right lower leg: No edema.      Left lower leg: No edema.   Lymphadenopathy:      Cervical: No cervical adenopathy.   Skin:     General: Skin is warm.   Neurological:      Mental Status: He is alert.           Depression Patient Health Questionnaire 7/9/2020 1/8/2020 1/3/2020 5/8/2019 1/11/2019 8/2/2018 2/23/2018   Over the last two weeks how often have you been bothered by little interest or pleasure in doing things 0 0 0 0 0 0 0   Over the last two weeks how often have you been bothered by feeling down, depressed or hopeless 0 0 0 0 - 0 0   PHQ-2 Total Score 0 0 0 0 - 0 0 "       PERTINENT RESULTS:   10/23/14   Cath lab procedure   C. ANGIOGRAPHIC RESULTS:    DIAGNOSTIC:       Patient has a left dominant coronary artery.        - Left Main Coronary Artery:             The ostial LM is normal. There is MARGARETTE 3 flow.       - Left Anterior Descending Artery:             The proximal LAD has a 20% stenosis. There is MARGARETTE 3 flow. The remaining portion of the vessel has luminal irregularities.       - D1:             The D1 is normal. There is MARGARETTE 3 flow.       - D2:             The D2 is normal. There is MARGARETTE 3 flow.       - Left Circumflex Artery:             The LCX has luminal irregularities. There is MARGARETTE 3 flow.       - OM1:             The OM1 has a 70% stenosis. There is MARGARETTE 3 flow. The remaining portion of the vessel has luminal irregularities.       - Right Coronary Artery:             The mid RCA has a 30% stenosis. There is MARGARETTE 3 flow. The remaining portion of the vessel has luminal irregularities.       - Posterior Lateral Branch:             The PLB has luminal irregularities. There is MARGARETTE 3 flow.       - Posterior Descending Artery:             The PDA has luminal irregularities. There is MARGARETTE 3 flow. The remaining portion of the vessel is of small caliber.      D. SUMMARY:    1. LVEDP=7mmHg  2. 70% OM1 stenosis    E. RECOMMENDATIONS:    1. Routine post-cath care.  2. Maximize medical secondary prevention of CAD.   3. Risk factor reduction.   4. EC ASA 81mg po qday  5. Follow-up with Dr. JAMIL Alonso in 2-4 weeks    I certify that I was present for catheter insertion, catheter manipulation, angiography, and angiographic interpretation of this patient.    This document has been electronically    SIGNED BY: Ham Arizmendi MD On: 10/23/2014 12:21       No visits with results within 1 Week(s) from this visit.   Latest known visit with results is:   Lab Visit on 07/02/2020   Component Date Value Ref Range Status    Sodium 07/02/2020 136  136 - 145 mmol/L Final    Potassium  07/02/2020 4.3  3.5 - 5.1 mmol/L Final    Chloride 07/02/2020 95  95 - 110 mmol/L Final    CO2 07/02/2020 32* 23 - 29 mmol/L Final    Glucose 07/02/2020 135* 70 - 110 mg/dL Final    BUN, Bld 07/02/2020 13  2 - 20 mg/dL Final    Creatinine 07/02/2020 0.78  0.50 - 1.40 mg/dL Final    Calcium 07/02/2020 9.5  8.7 - 10.5 mg/dL Final    Total Protein 07/02/2020 7.7  6.0 - 8.4 g/dL Final    Albumin 07/02/2020 4.6  3.5 - 5.2 g/dL Final    Total Bilirubin 07/02/2020 0.6  0.1 - 1.0 mg/dL Final    Comment: For infants and newborns, interpretation of results should be based  on gestational age, weight and in agreement with clinical  observations.  Premature Infant recommended reference ranges:  Up to 24 hours.............<8.0 mg/dL  Up to 48 hours............<12.0 mg/dL  3-5 days..................<15.0 mg/dL  6-29 days.................<15.0 mg/dL      Alkaline Phosphatase 07/02/2020 114  38 - 126 U/L Final    AST 07/02/2020 40  15 - 46 U/L Final    ALT 07/02/2020 43  10 - 44 U/L Final    Anion Gap 07/02/2020 9  8 - 16 mmol/L Final    eGFR if African American 07/02/2020 >60.0  >60 mL/min/1.73 m^2 Final    eGFR if non African American 07/02/2020 >60.0  >60 mL/min/1.73 m^2 Final    Comment: Calculation used to obtain the estimated glomerular filtration  rate (eGFR) is the CKD-EPI equation.       Cholesterol 07/02/2020 123  120 - 199 mg/dL Final    Comment: The National Cholesterol Education Program (NCEP) has set the  following guidelines (reference ranges) for Cholesterol:  Optimal.....................<200 mg/dL  Borderline High.............200-239 mg/dL  High........................> or = 240 mg/dL      Triglycerides 07/02/2020 73  30 - 150 mg/dL Final    Comment: The National Cholesterol Education Program (NCEP) has set the  following guidelines (reference values) for triglycerides:  Normal......................<150 mg/dL  Borderline High.............150-199 mg/dL  High........................200-499 mg/dL       HDL 07/02/2020 39* 40 - 75 mg/dL Final    Comment: The National Cholesterol Education Program (NCEP) has set the  following guidelines (reference values) for HDL Cholesterol:  Low...............<40 mg/dL  Optimal...........>60 mg/dL      LDL Cholesterol 07/02/2020 69.4  63.0 - 159.0 mg/dL Final    Comment: The National Cholesterol Education Program (NCEP) has set the  following guidelines (reference values) for LDL Cholesterol:  Optimal.......................<130 mg/dL  Borderline High...............130-159 mg/dL  High..........................160-189 mg/dL  Very High.....................>190 mg/dL      Hdl/Cholesterol Ratio 07/02/2020 31.7  20.0 - 50.0 % Final    Total Cholesterol/HDL Ratio 07/02/2020 3.2  2.0 - 5.0 Final    Non-HDL Cholesterol 07/02/2020 84  mg/dL Final    Comment: Risk category and Non-HDL cholesterol goals:  Coronary heart disease (CHD)or equivalent (10-year risk of CHD >20%):  Non-HDL cholesterol goal     <130 mg/dL  Two or more CHD risk factors and 10-year risk of CHD <= 20%:  Non-HDL cholesterol goal     <160 mg/dL  0 to 1 CHD risk factor:  Non-HDL cholesterol goal     <190 mg/dL      Hemoglobin A1C 07/02/2020 5.9* 4.0 - 5.6 % Final    Comment: ADA Screening Guidelines:  5.7-6.4%  Consistent with prediabetes  >or=6.5%  Consistent with diabetes  High levels of fetal hemoglobin interfere with the HbA1C  assay. Heterozygous hemoglobin variants (HbS, HgC, etc)do  not significantly interfere with this assay.   However, presence of multiple variants may affect accuracy.      Estimated Avg Glucose 07/02/2020 123  68 - 131 mg/dL Final    PSA, SCREEN 07/02/2020 1.9  0.00 - 4.00 ng/mL Final    Comment: PSA Expected levels:  Hormonal Therapy: <0.05 ng/ml  Prostatectomy: <0.01 ng/ml  Radiation Therapy: <1.00 ng/ml           ASSESSMENT/PLAN:  Problem List Items Addressed This Visit        Cardiac/Vascular    Coronary artery disease involving native coronary artery without angina pectoris     Overview     - CCS: 0  - 10/23/14 Angiogram LVEDP=7mmHg, 70% OM1 stenosis,  RCA has a 30% stenosis, LAD 20% stenosis   - goal LDL <70 on high dose statin  - BP goal < 130/80  - ASA 81 mg daily  - not on Beta-blocker due to bradycardia         Essential hypertension - Primary    Current Assessment & Plan     - well controlled  - continue current medications         Relevant Medications    lisinopriL-hydrochlorothiazide (PRINZIDE,ZESTORETIC) 20-25 mg Tab    Other Relevant Orders    Basic metabolic panel    Pure hypercholesterolemia    Current Assessment & Plan     Lab Results   Component Value Date    LDLCALC 69.4 07/02/2020     - well controlled  - continue current medication         Relevant Medications    atorvastatin (LIPITOR) 80 MG tablet       Endocrine    Prediabetes    Overview     - discussed recommendation for diet, exercise and weight loss           Current Assessment & Plan     Lab Results   Component Value Date    HGBA1C 5.9 (H) 07/02/2020              Relevant Orders    Hemoglobin A1C       Other    Severe obesity with body mass index (BMI) of 35.0 to 39.9 with serious comorbidity    Overview     - discussed recommendation for diet, exercise and weight loss               ORDERS:   Orders Placed This Encounter    Pneumococcal Conjugate Vaccine (13 Valent) (IM)    Basic metabolic panel    Hemoglobin A1C    lisinopriL-hydrochlorothiazide (PRINZIDE,ZESTORETIC) 20-25 mg Tab    atorvastatin (LIPITOR) 80 MG tablet     Vaccines recommended: Shingles vaccine at pharmacy    Follow-up in 6 months with labs or sooner with any concerns    Dr. Anni Beth D.O.   Family Medicine

## 2020-07-09 ENCOUNTER — OFFICE VISIT (OUTPATIENT)
Dept: FAMILY MEDICINE | Facility: CLINIC | Age: 65
End: 2020-07-09
Payer: MEDICARE

## 2020-07-09 VITALS
WEIGHT: 260.19 LBS | HEIGHT: 68 IN | DIASTOLIC BLOOD PRESSURE: 70 MMHG | BODY MASS INDEX: 39.43 KG/M2 | HEART RATE: 66 BPM | TEMPERATURE: 98 F | RESPIRATION RATE: 18 BRPM | SYSTOLIC BLOOD PRESSURE: 130 MMHG | OXYGEN SATURATION: 97 %

## 2020-07-09 DIAGNOSIS — I10 ESSENTIAL HYPERTENSION: Primary | ICD-10-CM

## 2020-07-09 DIAGNOSIS — I25.10 CORONARY ARTERY DISEASE INVOLVING NATIVE CORONARY ARTERY OF NATIVE HEART WITHOUT ANGINA PECTORIS: ICD-10-CM

## 2020-07-09 DIAGNOSIS — R73.03 PREDIABETES: ICD-10-CM

## 2020-07-09 DIAGNOSIS — E66.01 SEVERE OBESITY WITH BODY MASS INDEX (BMI) OF 35.0 TO 39.9 WITH SERIOUS COMORBIDITY: ICD-10-CM

## 2020-07-09 DIAGNOSIS — E78.00 PURE HYPERCHOLESTEROLEMIA: ICD-10-CM

## 2020-07-09 PROCEDURE — 99214 OFFICE O/P EST MOD 30 MIN: CPT | Mod: 25,HCNC,S$GLB, | Performed by: FAMILY MEDICINE

## 2020-07-09 PROCEDURE — 90670 PNEUMOCOCCAL CONJUGATE VACCINE 13-VALENT LESS THAN 5YO & GREATER THAN: ICD-10-PCS | Mod: HCNC,S$GLB,, | Performed by: FAMILY MEDICINE

## 2020-07-09 PROCEDURE — 1101F PT FALLS ASSESS-DOCD LE1/YR: CPT | Mod: HCNC,CPTII,S$GLB, | Performed by: FAMILY MEDICINE

## 2020-07-09 PROCEDURE — 90670 PCV13 VACCINE IM: CPT | Mod: HCNC,S$GLB,, | Performed by: FAMILY MEDICINE

## 2020-07-09 PROCEDURE — G0009 PNEUMOCOCCAL CONJUGATE VACCINE 13-VALENT LESS THAN 5YO & GREATER THAN: ICD-10-PCS | Mod: HCNC,S$GLB,, | Performed by: FAMILY MEDICINE

## 2020-07-09 PROCEDURE — 3075F SYST BP GE 130 - 139MM HG: CPT | Mod: HCNC,CPTII,S$GLB, | Performed by: FAMILY MEDICINE

## 2020-07-09 PROCEDURE — 99214 PR OFFICE/OUTPT VISIT, EST, LEVL IV, 30-39 MIN: ICD-10-PCS | Mod: 25,HCNC,S$GLB, | Performed by: FAMILY MEDICINE

## 2020-07-09 PROCEDURE — 3078F PR MOST RECENT DIASTOLIC BLOOD PRESSURE < 80 MM HG: ICD-10-PCS | Mod: HCNC,CPTII,S$GLB, | Performed by: FAMILY MEDICINE

## 2020-07-09 PROCEDURE — 99999 PR PBB SHADOW E&M-EST. PATIENT-LVL IV: CPT | Mod: PBBFAC,HCNC,, | Performed by: FAMILY MEDICINE

## 2020-07-09 PROCEDURE — 3008F PR BODY MASS INDEX (BMI) DOCUMENTED: ICD-10-PCS | Mod: HCNC,CPTII,S$GLB, | Performed by: FAMILY MEDICINE

## 2020-07-09 PROCEDURE — 1101F PR PT FALLS ASSESS DOC 0-1 FALLS W/OUT INJ PAST YR: ICD-10-PCS | Mod: HCNC,CPTII,S$GLB, | Performed by: FAMILY MEDICINE

## 2020-07-09 PROCEDURE — G0009 ADMIN PNEUMOCOCCAL VACCINE: HCPCS | Mod: HCNC,S$GLB,, | Performed by: FAMILY MEDICINE

## 2020-07-09 PROCEDURE — 3078F DIAST BP <80 MM HG: CPT | Mod: HCNC,CPTII,S$GLB, | Performed by: FAMILY MEDICINE

## 2020-07-09 PROCEDURE — 99999 PR PBB SHADOW E&M-EST. PATIENT-LVL IV: ICD-10-PCS | Mod: PBBFAC,HCNC,, | Performed by: FAMILY MEDICINE

## 2020-07-09 PROCEDURE — 3075F PR MOST RECENT SYSTOLIC BLOOD PRESS GE 130-139MM HG: ICD-10-PCS | Mod: HCNC,CPTII,S$GLB, | Performed by: FAMILY MEDICINE

## 2020-07-09 PROCEDURE — 3008F BODY MASS INDEX DOCD: CPT | Mod: HCNC,CPTII,S$GLB, | Performed by: FAMILY MEDICINE

## 2020-07-09 RX ORDER — ATORVASTATIN CALCIUM 80 MG/1
80 TABLET, FILM COATED ORAL NIGHTLY
Qty: 90 TABLET | Refills: 3 | Status: SHIPPED | OUTPATIENT
Start: 2020-07-09 | End: 2021-04-19

## 2020-07-09 RX ORDER — LISINOPRIL AND HYDROCHLOROTHIAZIDE 20; 25 MG/1; MG/1
1 TABLET ORAL DAILY
Qty: 90 TABLET | Refills: 3 | Status: SHIPPED | OUTPATIENT
Start: 2020-07-09 | End: 2021-09-27

## 2020-07-31 ENCOUNTER — OFFICE VISIT (OUTPATIENT)
Dept: CARDIOLOGY | Facility: CLINIC | Age: 65
End: 2020-07-31
Payer: MEDICARE

## 2020-07-31 VITALS
HEART RATE: 63 BPM | SYSTOLIC BLOOD PRESSURE: 110 MMHG | HEIGHT: 68 IN | WEIGHT: 264 LBS | BODY MASS INDEX: 40.01 KG/M2 | OXYGEN SATURATION: 98 % | DIASTOLIC BLOOD PRESSURE: 64 MMHG

## 2020-07-31 DIAGNOSIS — E66.01 SEVERE OBESITY WITH BODY MASS INDEX (BMI) OF 35.0 TO 39.9 WITH SERIOUS COMORBIDITY: ICD-10-CM

## 2020-07-31 DIAGNOSIS — I10 ESSENTIAL HYPERTENSION: ICD-10-CM

## 2020-07-31 DIAGNOSIS — I25.10 CORONARY ARTERY DISEASE INVOLVING NATIVE CORONARY ARTERY OF NATIVE HEART WITHOUT ANGINA PECTORIS: ICD-10-CM

## 2020-07-31 DIAGNOSIS — E78.00 PURE HYPERCHOLESTEROLEMIA: ICD-10-CM

## 2020-07-31 DIAGNOSIS — H26.9 CATARACT OF RIGHT EYE, UNSPECIFIED CATARACT TYPE: Primary | ICD-10-CM

## 2020-07-31 PROCEDURE — 3078F DIAST BP <80 MM HG: CPT | Mod: HCNC,CPTII,S$GLB, | Performed by: INTERNAL MEDICINE

## 2020-07-31 PROCEDURE — 1101F PT FALLS ASSESS-DOCD LE1/YR: CPT | Mod: HCNC,CPTII,S$GLB, | Performed by: INTERNAL MEDICINE

## 2020-07-31 PROCEDURE — 3074F PR MOST RECENT SYSTOLIC BLOOD PRESSURE < 130 MM HG: ICD-10-PCS | Mod: HCNC,CPTII,S$GLB, | Performed by: INTERNAL MEDICINE

## 2020-07-31 PROCEDURE — 99999 PR PBB SHADOW E&M-EST. PATIENT-LVL III: CPT | Mod: PBBFAC,HCNC,, | Performed by: INTERNAL MEDICINE

## 2020-07-31 PROCEDURE — 1101F PR PT FALLS ASSESS DOC 0-1 FALLS W/OUT INJ PAST YR: ICD-10-PCS | Mod: HCNC,CPTII,S$GLB, | Performed by: INTERNAL MEDICINE

## 2020-07-31 PROCEDURE — 3078F PR MOST RECENT DIASTOLIC BLOOD PRESSURE < 80 MM HG: ICD-10-PCS | Mod: HCNC,CPTII,S$GLB, | Performed by: INTERNAL MEDICINE

## 2020-07-31 PROCEDURE — 99999 PR PBB SHADOW E&M-EST. PATIENT-LVL III: ICD-10-PCS | Mod: PBBFAC,HCNC,, | Performed by: INTERNAL MEDICINE

## 2020-07-31 PROCEDURE — 3008F BODY MASS INDEX DOCD: CPT | Mod: HCNC,CPTII,S$GLB, | Performed by: INTERNAL MEDICINE

## 2020-07-31 PROCEDURE — 99214 OFFICE O/P EST MOD 30 MIN: CPT | Mod: HCNC,S$GLB,, | Performed by: INTERNAL MEDICINE

## 2020-07-31 PROCEDURE — 99214 PR OFFICE/OUTPT VISIT, EST, LEVL IV, 30-39 MIN: ICD-10-PCS | Mod: HCNC,S$GLB,, | Performed by: INTERNAL MEDICINE

## 2020-07-31 PROCEDURE — 3074F SYST BP LT 130 MM HG: CPT | Mod: HCNC,CPTII,S$GLB, | Performed by: INTERNAL MEDICINE

## 2020-07-31 PROCEDURE — 3008F PR BODY MASS INDEX (BMI) DOCUMENTED: ICD-10-PCS | Mod: HCNC,CPTII,S$GLB, | Performed by: INTERNAL MEDICINE

## 2020-07-31 NOTE — ASSESSMENT & PLAN NOTE
BMI 40.  Pt understands health complications a/w obesity and desires to lose weight.    - encouraged increased activity and exercise [id number]/day 5x/week and improved diet (portion control) for weight loss

## 2020-07-31 NOTE — ASSESSMENT & PLAN NOTE
Controlled.  Pt on high intensity statin.  Goal LDL < 70, last LDL 69.4 7/2/2020.  - continue medical therapy   - encouraged risk factor and lifestyle modifications

## 2020-07-31 NOTE — ASSESSMENT & PLAN NOTE
Controlled.  Pt compliant w/ meds.  Goal BP < 130/80.  Pt does not monitor at home.   - continue medical therapy  - pt to monitor BP at home and record, to bring in record to cardiology and PCP appts  - encouraged risk factor and lifestyle modifications

## 2020-07-31 NOTE — PROGRESS NOTES
Subjective:    Patient ID:  Jean Shaw Jr. is a 65 y.o. male who presents for follow-up of Follow-up (HTN)      PCP/Referring: Anni Beth DO     Prior Cardiologist: Dr. Berhane Alonso    Pt is a 66 yo M w/ PMH of CAD w/ (OM1 70% stenosis per Dr. Arizmendi 10/2014), HTN, HLD, Morbid Obesity w/ BMI 40, and prediabetes w/ hgba1c of 5.8% who presents for f/u.  He mentions that he has switched from Dr. Alonso to obtain a cardiologist closer to his home.  He mentions that he last saw Dr. Alonso 1/11/19 for his CAD and was continued on medical therapy for secondary prevention.  He mentions that he has been doing well since his last appt however he mentions that he has some blurred vision in his R eye which he attributes to a worsening cataract.  He denies cp, sob, edema, orthopnea, PND, presyncope, palpitations, LOC, or claudication.  He is compliant w/ meds and denies side effects.  He does not monitor his BP at home but has a BP cuff.  He does not exercise much but mentions that he is active at work by walking a lot and gardening and is only limited by chronic back pain.       Past Medical History:   Diagnosis Date    Allergy     Asthma     Cataract 3/14/2019    Scheduled for left cataract extraction on 03/26/2019    Combined forms of age-related cataract of both eyes 3/26/2019    Fever blister     GERD (gastroesophageal reflux disease)     History of left cataract extraction 5/7/2019    Hyperlipidemia     Hypertension     Nasal polyps 1/15/2015    Onychomycosis due to dermatophyte 8/28/2019     Past Surgical History:   Procedure Laterality Date    CARDIAC CATHETERIZATION  10/23/14    CATARACT EXTRACTION W/  INTRAOCULAR LENS IMPLANT Left 3/26/2019    Procedure: EXTRACTION, CATARACT, WITH IOL INSERTION;  Surgeon: Wilmar Jo MD;  Location: [facility];  Service: Ophthalmology;  Laterality: Left;    COLONOSCOPY      HERNIA REPAIR  2011    HERNIA REPAIR      HERNIA REPAIR      SKIN BIOPSY        Social History     Socioeconomic History    Marital status:      Spouse name: Not on file    Number of children: Not on file    Years of education: Not on file    Highest education level: Not on file   Occupational History    Not on file   Social Needs    Financial resource strain: Not very hard    Food insecurity     Worry: Never true     Inability: Never true    Transportation needs     Medical: No     Non-medical: No   Tobacco Use    Smoking status: Never Smoker    Smokeless tobacco: Never Used   Substance and Sexual Activity    Alcohol use: Yes     Alcohol/week: 0.0 standard drinks     Frequency: Never     Binge frequency: Never     Comment: occ wine    Drug use: No    Sexual activity: Not Currently     Partners: Female   Lifestyle    Physical activity     Days per week: 0 days     Minutes per session: 0 min    Stress: Not at all   Relationships    Social connections     Talks on phone: More than three times a week     Gets together: Once a week     Attends Islam service: Not on file     Active member of club or organization: No     Attends meetings of clubs or organizations: Never     Relationship status:    Other Topics Concern    Not on file   Social History Narrative    . No children. Self-employed and does construction work. Lives alone     Family History   Problem Relation Age of Onset    Osteoporosis Mother     Seizures Mother     Dementia Mother     Stroke Mother     Stroke Father     Diabetes Sister     No Known Problems Brother     Stroke Maternal Grandfather     No Known Problems Sister        Review of patient's allergies indicates:  No Known Allergies    Medication List with Changes/Refills   Current Medications    ALBUTEROL (PROVENTIL/VENTOLIN HFA) 90 MCG/ACTUATION INHALER    Inhale 2 puffs into the lungs every 6 (six) hours as needed for Wheezing.    ASPIRIN 81 MG CHEW    Take 1 tablet (81 mg total) by mouth once daily.    ATORVASTATIN  "(LIPITOR) 80 MG TABLET    Take 1 tablet (80 mg total) by mouth every evening.    LISINOPRIL-HYDROCHLOROTHIAZIDE (PRINZIDE,ZESTORETIC) 20-25 MG TAB    Take 1 tablet by mouth once daily.    OMEPRAZOLE (PRILOSEC) 20 MG CAPSULE    Take 1 capsule (20 mg total) by mouth once daily.    TRIAMCINOLONE (NASACORT) 55 MCG NASAL INHALER    2 sprays by Nasal route 2 (two) times daily.    VARICELLA-ZOSTER GE-AS01B, PF, (SHINGRIX, PF,) 50 MCG/0.5 ML INJECTION    Inject 0.5mL IM at 0 and 2-6 months       Review of Systems   Constitution: Negative for diaphoresis and fever.   HENT: Negative for congestion and hearing loss.    Eyes: Negative for blurred vision and pain.   Cardiovascular: Negative for chest pain, claudication, dyspnea on exertion, leg swelling, near-syncope, palpitations and syncope.   Respiratory: Negative for shortness of breath and sleep disturbances due to breathing.    Hematologic/Lymphatic: Negative for bleeding problem. Does not bruise/bleed easily.   Skin: Negative for color change and poor wound healing.   Musculoskeletal: Positive for back pain.   Gastrointestinal: Negative for abdominal pain and nausea.   Genitourinary: Negative for bladder incontinence and flank pain.   Neurological: Negative for focal weakness and light-headedness.        Objective:   /64 (BP Location: Right leg, Patient Position: Sitting, BP Method: X-Large (Manual))   Pulse 63   Ht 5' 8" (1.727 m)   Wt 119.7 kg (264 lb)   SpO2 98%   BMI 40.14 kg/m²    Physical Exam   Constitutional: He is oriented to person, place, and time. He appears well-developed and well-nourished.   HENT:   Head: Normocephalic and atraumatic.   Mouth/Throat: Oropharynx is clear and moist.   Eyes: Pupils are equal, round, and reactive to light. EOM are normal. No scleral icterus.   Neck: Normal range of motion. Neck supple. No JVD present.   Cardiovascular: Normal rate, regular rhythm, S1 normal, S2 normal and intact distal pulses. Exam reveals no gallop " and no friction rub.   No murmur heard.  Pulmonary/Chest: Effort normal and breath sounds normal. No respiratory distress. He has no wheezes. He has no rales. He exhibits no tenderness.   L chest wall TTP   Abdominal: Soft. Bowel sounds are normal. He exhibits no distension and no mass. There is no abdominal tenderness. There is no rebound.   obese   Musculoskeletal: Normal range of motion.         General: No tenderness or edema.   Neurological: He is alert and oriented to person, place, and time. He displays normal reflexes. Coordination normal.   Skin: Skin is warm and dry. He is not diaphoretic. No pallor.   Psychiatric: He has a normal mood and affect. His behavior is normal. Judgment normal.         EC2020- reviewed.  Sinus bradycardia, LAD, possible LVH.    Assessment:       1. Coronary artery disease involving native coronary artery of native heart without angina pectoris    2. Essential hypertension    3. Pure hypercholesterolemia    4. Severe obesity with body mass index (BMI) of 35.0 to 39.9 with serious comorbidity         Plan:         Coronary artery disease involving native coronary artery of native heart without angina pectoris  CCS 0.  Pt w/ NYHA Class I functional status.  Compliant w/ medical therapy.  Angiogram in 10/2014 w/ OM1 70%, prox LAD 20% stenosis, and RCA 30% stenosis.    - continue medical therapy for secondary prevention  - encouraged risk factor and lifestyle modifications     Essential hypertension  Controlled.  Pt compliant w/ meds.  Goal BP < 130/80.  Pt does not monitor at home.   - continue medical therapy  - pt to monitor BP at home and record, to bring in record to cardiology and PCP appts  - encouraged risk factor and lifestyle modifications    Pure hypercholesterolemia  Controlled.  Pt on high intensity statin.  Goal LDL < 70, last LDL 69.4 2020.  - continue medical therapy   - encouraged risk factor and lifestyle modifications    Severe obesity with body mass  index (BMI) of 35.0 to 39.9 with serious comorbidity  BMI 40.  Pt understands health complications a/w obesity and desires to lose weight.    - encouraged increased activity and exercise q68mkwg/day 5x/week and improved diet (portion control) for weight loss      Total duration of face to face visit time 30 minutes.  Total time spent counseling greater than fifty percent of total visit time.  Counseling included discussion regarding imaging findings, diagnosis, possibilities, treatment options, risks and benefits.  The patient had many questions regarding the options and long-term effects      Lamont Byers M.D.  Interventional Cardiology

## 2020-09-17 ENCOUNTER — OFFICE VISIT (OUTPATIENT)
Dept: OPTOMETRY | Facility: CLINIC | Age: 65
End: 2020-09-17
Payer: MEDICARE

## 2020-09-17 DIAGNOSIS — Z96.1 PSEUDOPHAKIA OF LEFT EYE: ICD-10-CM

## 2020-09-17 DIAGNOSIS — H26.9 CATARACT OF RIGHT EYE, UNSPECIFIED CATARACT TYPE: Primary | ICD-10-CM

## 2020-09-17 PROCEDURE — 99999 PR PBB SHADOW E&M-EST. PATIENT-LVL II: CPT | Mod: PBBFAC,HCNC,, | Performed by: OPTOMETRIST

## 2020-09-17 PROCEDURE — 92004 COMPRE OPH EXAM NEW PT 1/>: CPT | Mod: HCNC,S$GLB,, | Performed by: OPTOMETRIST

## 2020-09-17 PROCEDURE — 99999 PR PBB SHADOW E&M-EST. PATIENT-LVL II: ICD-10-PCS | Mod: PBBFAC,HCNC,, | Performed by: OPTOMETRIST

## 2020-09-17 PROCEDURE — 92004 PR EYE EXAM, NEW PATIENT,COMPREHESV: ICD-10-PCS | Mod: HCNC,S$GLB,, | Performed by: OPTOMETRIST

## 2020-09-17 NOTE — LETTER
September 17, 2020      Lamont Byers III, MD  [address] [address]           [place] [facility]  [address]  [place] [address]  Phone: [phone]  Fax: [phone]          Patient: Jean Shaw Jr.   MR Number: [id number]   YOB: 1955   Date of Visit: 9/17/2020       Dear Dr. Lamont Byers III:    Thank you for referring Jean Shaw to me for evaluation. Attached you will find relevant portions of my assessment and plan of care.    If you have questions, please do not hesitate to call me. I look forward to following Jean Shaw along with you.    Sincerely,    My Gerardo, OD    Enclosure  CC:  No Recipients    If you would like to receive this communication electronically, please contact [email or web address] or [phone] to request more information on [employer] Link access.    For providers and/or their staff who would like to refer a patient to [facility], please contact us through our one-stop-shop provider referral line, [facility], at [phone].    If you feel you have received this communication in error or would no longer like to receive these types of communications, please e-mail [email or web address]

## 2020-09-17 NOTE — PROGRESS NOTES
HPI     Pt here for annual visit and to check status of cataract OD  Pt had sx OS cataract removal   Feels like he looking through a fog OD  Feels like OD ready for sx   Patient denies diplopia, headaches, flashes/floaters, pain, and   itching/burning/tearing.    Pt does not use any eye drops.      Last edited by Emma Rollins on 9/17/2020 10:33 AM. (History)            Assessment /Plan     For exam results, see Encounter Report.    Cataract of right eye, unspecified cataract type   Consult for cat eval    Asteroid Hyalosis OS  Pseudophakia of left eye   Stable, monitor    RTC 1 year, sooner PRN

## 2020-10-27 ENCOUNTER — PATIENT OUTREACH (OUTPATIENT)
Dept: ADMINISTRATIVE | Facility: OTHER | Age: 65
End: 2020-10-27

## 2020-10-27 NOTE — PROGRESS NOTES
Requested updates within Care Everywhere.  Patient's chart was reviewed for overdue ANTOINETTE topics.  Immunizations reconciled.    Orders placed:n/a  Tasked appts:n/a  Labs Linked:n/a

## 2020-10-28 ENCOUNTER — OFFICE VISIT (OUTPATIENT)
Dept: OPHTHALMOLOGY | Facility: CLINIC | Age: 65
End: 2020-10-28
Attending: OPHTHALMOLOGY
Payer: MEDICARE

## 2020-10-28 DIAGNOSIS — Z01.818 PRE-OP TESTING: ICD-10-CM

## 2020-10-28 DIAGNOSIS — H25.11 NUCLEAR SCLEROSIS OF RIGHT EYE: Primary | ICD-10-CM

## 2020-10-28 PROCEDURE — 99999 PR PBB SHADOW E&M-EST. PATIENT-LVL III: CPT | Mod: PBBFAC,HCNC,, | Performed by: OPHTHALMOLOGY

## 2020-10-28 PROCEDURE — 92136 OPHTHALMIC BIOMETRY: CPT | Mod: HCNC,RT,S$GLB, | Performed by: OPHTHALMOLOGY

## 2020-10-28 PROCEDURE — 99999 PR PBB SHADOW E&M-EST. PATIENT-LVL III: ICD-10-PCS | Mod: PBBFAC,HCNC,, | Performed by: OPHTHALMOLOGY

## 2020-10-28 PROCEDURE — 92136 IOL MASTER - OU - BOTH EYES: ICD-10-PCS | Mod: HCNC,RT,S$GLB, | Performed by: OPHTHALMOLOGY

## 2020-10-28 PROCEDURE — 92004 COMPRE OPH EXAM NEW PT 1/>: CPT | Mod: HCNC,S$GLB,, | Performed by: OPHTHALMOLOGY

## 2020-10-28 PROCEDURE — 92004 PR EYE EXAM, NEW PATIENT,COMPREHESV: ICD-10-PCS | Mod: HCNC,S$GLB,, | Performed by: OPHTHALMOLOGY

## 2020-10-28 RX ORDER — PHENYLEPHRINE HYDROCHLORIDE 25 MG/ML
1 SOLUTION/ DROPS OPHTHALMIC
Status: CANCELLED | OUTPATIENT
Start: 2020-10-28

## 2020-10-28 RX ORDER — PREDNISOLONE ACETATE-GATIFLOXACIN-BROMFENAC .75; 5; 1 MG/ML; MG/ML; MG/ML
1 SUSPENSION/ DROPS OPHTHALMIC 3 TIMES DAILY
Qty: 5 ML | Refills: 3 | Status: SHIPPED | OUTPATIENT
Start: 2020-10-28 | End: 2020-12-18 | Stop reason: ALTCHOICE

## 2020-10-28 RX ORDER — MOXIFLOXACIN 5 MG/ML
1 SOLUTION/ DROPS OPHTHALMIC
Status: CANCELLED | OUTPATIENT
Start: 2020-10-28

## 2020-10-28 RX ORDER — TROPICAMIDE 10 MG/ML
1 SOLUTION/ DROPS OPHTHALMIC
Status: CANCELLED | OUTPATIENT
Start: 2020-10-28

## 2020-10-28 RX ORDER — SODIUM CHLORIDE 0.9 % (FLUSH) 0.9 %
10 SYRINGE (ML) INJECTION
Status: DISCONTINUED | OUTPATIENT
Start: 2020-10-28 | End: 2023-07-10

## 2020-10-28 RX ORDER — TETRACAINE HYDROCHLORIDE 5 MG/ML
1 SOLUTION OPHTHALMIC
Status: CANCELLED | OUTPATIENT
Start: 2020-10-28

## 2020-10-28 NOTE — LETTER
October 28, 2020      My Gerardo, OD  [address]  [facility] [address]           [place] [address]st [address]  [address]  [facility] [address]  Phone: [phone]  Fax: [phone]          Patient: Jean Shaw Jr.   MR Number: [id number]   YOB: 1955   Date of Visit: 10/28/2020       Dear Dr. My Gerardo:    Thank you for referring Jean Shaw to me for evaluation. Attached you will find relevant portions of my assessment and plan of care.    If you have questions, please do not hesitate to call me. I look forward to following Jean Shaw along with you.    Sincerely,    Gladys Santiago MD    Enclosure  CC:  No Recipients    If you would like to receive this communication electronically, please contact [email or web address] or [phone] to request more information on [employer] Link access.    For providers and/or their staff who would like to refer a patient to [facility], please contact us through our one-stop-shop provider referral line, [facility], at [phone].    If you feel you have received this communication in error or would no longer like to receive these types of communications, please e-mail [email or web address]

## 2020-10-28 NOTE — H&P (VIEW-ONLY)
HPI     DLS: 9/17/20 With Dr. Gerardo    Pt here for Cataract Eval per Dr. Gerardo;  Pt states he had cataract surgery at [facility] a few years ago on his OS.    Meds;  No GTTS      Last edited by Selene Jorge on 10/28/2020  8:21 AM. (History)            Assessment /Plan     For exam results, see Encounter Report.    Nuclear sclerosis of right eye      Visually Significant Cataract: Patient reports decreased vision consistent with the clinical amount of lenticular opacity, which reaches the level of visual significance and affects activities of daily living. Risks, benefits, and alternatives to cataract surgery were discussed and the consent reviewed. IOL options were discussed, including ATIOLs and the associated side effects and additional patient cost associated with them.   IOL Selections:   Right eye  IOL: dcboo17.0     Left eye  IOL: PCIOL    Pt wishes to have right eye done first.

## 2020-10-28 NOTE — PROGRESS NOTES
HPI     DLS: 9/17/20 With Dr. Gerardo    Pt here for Cataract Eval per Dr. Gerardo;  Pt states he had cataract surgery at [facility] a few years ago on his OS.    Meds;  No GTTS      Last edited by Selene Jorge on 10/28/2020  8:21 AM. (History)            Assessment /Plan     For exam results, see Encounter Report.    Nuclear sclerosis of right eye      Visually Significant Cataract: Patient reports decreased vision consistent with the clinical amount of lenticular opacity, which reaches the level of visual significance and affects activities of daily living. Risks, benefits, and alternatives to cataract surgery were discussed and the consent reviewed. IOL options were discussed, including ATIOLs and the associated side effects and additional patient cost associated with them.   IOL Selections:   Right eye  IOL: dcboo17.0     Left eye  IOL: PCIOL    Pt wishes to have right eye done first.

## 2020-10-30 ENCOUNTER — TELEPHONE (OUTPATIENT)
Dept: OPHTHALMOLOGY | Facility: CLINIC | Age: 65
End: 2020-10-30

## 2020-10-30 DIAGNOSIS — H25.11 NUCLEAR SCLEROTIC CATARACT OF RIGHT EYE: Primary | ICD-10-CM

## 2020-11-05 ENCOUNTER — TELEPHONE (OUTPATIENT)
Dept: OPHTHALMOLOGY | Facility: CLINIC | Age: 65
End: 2020-11-05

## 2020-11-05 NOTE — TELEPHONE ENCOUNTER
Patient given arrival time for surgery as 7 am.  Nothing to eat or drink after 9 pm night before.  May have water/gatorade/powerade from 9 pm until leaves house morning of surgery.

## 2020-11-06 ENCOUNTER — LAB VISIT (OUTPATIENT)
Dept: FAMILY MEDICINE | Facility: CLINIC | Age: 65
End: 2020-11-06
Payer: MEDICARE

## 2020-11-06 DIAGNOSIS — Z01.818 PRE-OP TESTING: ICD-10-CM

## 2020-11-06 PROCEDURE — U0003 INFECTIOUS AGENT DETECTION BY NUCLEIC ACID (DNA OR RNA); SEVERE ACUTE RESPIRATORY SYNDROME CORONAVIRUS 2 (SARS-COV-2) (CORONAVIRUS DISEASE [COVID-19]), AMPLIFIED PROBE TECHNIQUE, MAKING USE OF HIGH THROUGHPUT TECHNOLOGIES AS DESCRIBED BY CMS-2020-01-R: HCPCS | Mod: HCNC

## 2020-11-07 LAB — SARS-COV-2 RNA RESP QL NAA+PROBE: NOT DETECTED

## 2020-11-09 ENCOUNTER — HOSPITAL ENCOUNTER (OUTPATIENT)
Facility: OTHER | Age: 65
Discharge: HOME | End: 2020-11-09
Attending: OPHTHALMOLOGY | Admitting: OPHTHALMOLOGY
Payer: MEDICARE

## 2020-11-09 ENCOUNTER — ANESTHESIA EVENT (OUTPATIENT)
Dept: SURGERY | Facility: OTHER | Age: 65
End: 2020-11-09
Payer: MEDICARE

## 2020-11-09 ENCOUNTER — ANESTHESIA (OUTPATIENT)
Dept: SURGERY | Facility: OTHER | Age: 65
End: 2020-11-09
Payer: MEDICARE

## 2020-11-09 VITALS
TEMPERATURE: 98 F | BODY MASS INDEX: 38.95 KG/M2 | DIASTOLIC BLOOD PRESSURE: 65 MMHG | HEART RATE: 60 BPM | RESPIRATION RATE: 16 BRPM | HEIGHT: 69 IN | OXYGEN SATURATION: 95 % | SYSTOLIC BLOOD PRESSURE: 137 MMHG | WEIGHT: 263 LBS

## 2020-11-09 DIAGNOSIS — H25.11 NUCLEAR SCLEROSIS OF RIGHT EYE: ICD-10-CM

## 2020-11-09 PROCEDURE — A4216 STERILE WATER/SALINE, 10 ML: HCPCS | Mod: HCNC | Performed by: NURSE ANESTHETIST, CERTIFIED REGISTERED

## 2020-11-09 PROCEDURE — 37000009 HC ANESTHESIA EA ADD 15 MINS: Mod: HCNC | Performed by: OPHTHALMOLOGY

## 2020-11-09 PROCEDURE — 66984 PR REMOVAL, CATARACT, W/INSRT INTRAOC LENS, W/O ENDO CYCLO: ICD-10-PCS | Mod: HCNC,RT,, | Performed by: OPHTHALMOLOGY

## 2020-11-09 PROCEDURE — 66984 XCAPSL CTRC RMVL W/O ECP: CPT | Mod: HCNC,RT,, | Performed by: OPHTHALMOLOGY

## 2020-11-09 PROCEDURE — 36000706: Mod: HCNC | Performed by: OPHTHALMOLOGY

## 2020-11-09 PROCEDURE — 25000003 PHARM REV CODE 250: Mod: HCNC | Performed by: OPHTHALMOLOGY

## 2020-11-09 PROCEDURE — 25000003 PHARM REV CODE 250: Mod: HCNC | Performed by: NURSE ANESTHETIST, CERTIFIED REGISTERED

## 2020-11-09 PROCEDURE — 36000707: Mod: HCNC | Performed by: OPHTHALMOLOGY

## 2020-11-09 PROCEDURE — 63600175 PHARM REV CODE 636 W HCPCS: Mod: HCNC | Performed by: NURSE ANESTHETIST, CERTIFIED REGISTERED

## 2020-11-09 PROCEDURE — V2632 POST CHMBR INTRAOCULAR LENS: HCPCS | Mod: HCNC | Performed by: OPHTHALMOLOGY

## 2020-11-09 PROCEDURE — 71000015 HC POSTOP RECOV 1ST HR: Mod: HCNC | Performed by: OPHTHALMOLOGY

## 2020-11-09 PROCEDURE — 37000008 HC ANESTHESIA 1ST 15 MINUTES: Mod: HCNC | Performed by: OPHTHALMOLOGY

## 2020-11-09 DEVICE — LENS IOL ITEC PRELOAD 17.0D: Type: IMPLANTABLE DEVICE | Site: EYE | Status: FUNCTIONAL

## 2020-11-09 RX ORDER — ACETAMINOPHEN 325 MG/1
650 TABLET ORAL EVERY 4 HOURS PRN
Status: DISCONTINUED | OUTPATIENT
Start: 2020-11-09 | End: 2020-11-09 | Stop reason: HOSPADM

## 2020-11-09 RX ORDER — MOXIFLOXACIN 5 MG/ML
1 SOLUTION/ DROPS OPHTHALMIC
Status: COMPLETED | OUTPATIENT
Start: 2020-11-09 | End: 2020-11-09

## 2020-11-09 RX ORDER — LIDOCAINE HYDROCHLORIDE 40 MG/ML
INJECTION, SOLUTION RETROBULBAR
Status: DISCONTINUED | OUTPATIENT
Start: 2020-11-09 | End: 2020-11-09 | Stop reason: HOSPADM

## 2020-11-09 RX ORDER — SODIUM CHLORIDE 0.9 % (FLUSH) 0.9 %
SYRINGE (ML) INJECTION
Status: DISCONTINUED | OUTPATIENT
Start: 2020-11-09 | End: 2020-11-09

## 2020-11-09 RX ORDER — TROPICAMIDE 10 MG/ML
1 SOLUTION/ DROPS OPHTHALMIC
Status: COMPLETED | OUTPATIENT
Start: 2020-11-09 | End: 2020-11-09

## 2020-11-09 RX ORDER — TETRACAINE HYDROCHLORIDE 5 MG/ML
1 SOLUTION OPHTHALMIC
Status: COMPLETED | OUTPATIENT
Start: 2020-11-09 | End: 2020-11-09

## 2020-11-09 RX ORDER — MIDAZOLAM HYDROCHLORIDE 1 MG/ML
INJECTION INTRAMUSCULAR; INTRAVENOUS
Status: DISCONTINUED | OUTPATIENT
Start: 2020-11-09 | End: 2020-11-09

## 2020-11-09 RX ORDER — MOXIFLOXACIN 5 MG/ML
SOLUTION/ DROPS OPHTHALMIC
Status: DISCONTINUED | OUTPATIENT
Start: 2020-11-09 | End: 2020-11-09 | Stop reason: HOSPADM

## 2020-11-09 RX ORDER — PROPARACAINE HYDROCHLORIDE 5 MG/ML
1 SOLUTION/ DROPS OPHTHALMIC
Status: DISCONTINUED | OUTPATIENT
Start: 2020-11-09 | End: 2020-11-09 | Stop reason: HOSPADM

## 2020-11-09 RX ORDER — TETRACAINE HYDROCHLORIDE 5 MG/ML
SOLUTION OPHTHALMIC
Status: DISCONTINUED | OUTPATIENT
Start: 2020-11-09 | End: 2020-11-09 | Stop reason: HOSPADM

## 2020-11-09 RX ORDER — PHENYLEPHRINE HYDROCHLORIDE 25 MG/ML
1 SOLUTION/ DROPS OPHTHALMIC
Status: COMPLETED | OUTPATIENT
Start: 2020-11-09 | End: 2020-11-09

## 2020-11-09 RX ADMIN — TETRACAINE HYDROCHLORIDE 1 DROP: 5 SOLUTION OPHTHALMIC at 07:11

## 2020-11-09 RX ADMIN — TROPICAMIDE 1 DROP: 10 SOLUTION/ DROPS OPHTHALMIC at 07:11

## 2020-11-09 RX ADMIN — SODIUM CHLORIDE, PRESERVATIVE FREE 10 ML: 5 INJECTION INTRAVENOUS at 08:11

## 2020-11-09 RX ADMIN — PHENYLEPHRINE HYDROCHLORIDE 1 DROP: 25 SOLUTION/ DROPS OPHTHALMIC at 07:11

## 2020-11-09 RX ADMIN — MOXIFLOXACIN 1 DROP: 5 SOLUTION/ DROPS OPHTHALMIC at 07:11

## 2020-11-09 RX ADMIN — MIDAZOLAM HYDROCHLORIDE 2 MG: 1 INJECTION, SOLUTION INTRAMUSCULAR; INTRAVENOUS at 08:11

## 2020-11-09 RX ADMIN — MOXIFLOXACIN 1 DROP: 5 SOLUTION/ DROPS OPHTHALMIC at 09:11

## 2020-11-09 NOTE — OP NOTE
SURGEON:  Gladys Santiago M.D.    PREOPERATIVE DIAGNOSIS:    Nuclear Sclerotic Cataract Right Eye    POSTOPERATIVE DIAGNOSIS:    Nuclear Sclerotic Cataract Right Eye    PROCEDURES:    Phacoemulsification with  intraocular lens, Right eye (26241)    DATE OF SURGERY: 11/09/2020    IMPLANT: pcboo 17.0    ANESTHESIA:  MAC with topical Lidocaine    COMPLICATIONS:  None    ESTIMATED BLOOD LOSS: None    SPECIMENS: None    INDICATIONS:    The patient has a history of painless progressive visual loss and  difficulty with activities of daily living secondary to cataract formation.  After a thorough discussion of the risks, benefits, and alternatives to cataract surgery, including, but not limited to, the rare risks of infection, retinal detachment, hemorrhage, need for additional surgery, loss of vision, and even loss of the eye, the patient voices understanding and desires to proceed.    DESCRIPTION OF PROCEDURE:    The patients IOL calculations were reviewed, and the lens selection confirmed.   After verification and marking of the proper eye in the preop holding area, the patient was brought to the operating room in supine position where the eye was prepped and draped in standard sterile fashion with 5% Betadine and a lid speculum placed in the eye.   Topical 4% Lidocaine was used in addition to the preoperative anesthesia and the procedure was begun by the creation of a paracentesis incision through which viscoelastic was used to fill the anterior chamber.  Next, a keratome blade was used to create a triplanar temporal clear corneal incision and a cystotome and Utrata forceps used to fashion a continuous curvilinear capsulorrhexis.  Hydrodissection was carried out using the Her hydrodissection cannula and the nucleus was found to be mobile.  Phacoemulsification of the nucleus was carried out using a quick chop technique, and all remaining epinuclear and cortical material was removed.  The eye was then reformed with  Viscoelastic and the  intraocular lens was implanted into the capsular bag.  All remaining viscoelastics were removed from the eye and at the end of the case the pupil was round, the lens was well-centered within the capsular bag and all wounds were found to be water tight.  Drops of Vigamox and Pred Forte were instilled and a shield was placed over the eye. The patient will follow up with Dr. Santiago in the morning.

## 2020-11-09 NOTE — ANESTHESIA POSTPROCEDURE EVALUATION
Anesthesia Post Evaluation    Patient: Jean Shaw JrSoledad    Procedure(s) Performed: Procedure(s) (LRB):  EXTRACTION, CATARACT, WITH IOL INSERTION (Right)    Final Anesthesia Type: MAC    Patient location during evaluation: [facility]  Patient participation: Yes- Able to Participate  Level of consciousness: awake and alert, awake and oriented  Post-procedure vital signs: reviewed and stable  Pain management: adequate  Airway patency: patent    PONV status at discharge: No PONV  Anesthetic complications: no      Cardiovascular status: blood pressure returned to baseline, hemodynamically stable and stable  Respiratory status: spontaneous ventilation, unassisted and room air  Hydration status: euvolemic  Follow-up not needed.          Vitals Value Taken Time   /85 11/09/20 0702   Temp 36.6 °C (97.9 °F) 11/09/20 0702   Pulse 59 11/09/20 0702   Resp 20 11/09/20 0702   SpO2 98 % 11/09/20 0702         No case tracking events are documented in the log.      Pain/Adelaida Score: No data recorded

## 2020-11-09 NOTE — PLAN OF CARE
Jean Shaw Jr. has met all discharge criteria from Phase II. Vital Signs are stable, ambulating  without difficulty. Discharge instructions given, patient verbalized understanding. Discharged from facility via wheelchair in stable condition.

## 2020-11-09 NOTE — DISCHARGE SUMMARY
Outcome: Successful outpatient ophthalmic surgical procedure  Preprinted Instructions given to patient.  Regular diet.  Activity: No restrictions  Meds: see Med Rec  Condition: stable  Follow up: 1 day with Dr Santiago  Disposition: Home  Diagnosis: s/p eye surgery

## 2020-11-09 NOTE — ANESTHESIA PREPROCEDURE EVALUATION
11/09/2020  Jean Shaw Jr. is a 65 y.o., male.    Anesthesia Evaluation    I have reviewed the Patient Summary Reports.    I have reviewed the Nursing Notes. I have reviewed the NPO Status.   I have reviewed the Medications.     Review of Systems  Anesthesia Hx:  No problems with previous Anesthesia  Denies Family Hx of Anesthesia complications.   Denies Personal Hx of Anesthesia complications.   Social:  Non-Smoker    Hematology/Oncology:  Hematology Normal   Oncology Normal     EENT/Dental:  EENT/Dental Normal  Ears General/Symptom(s) Hearing Impairment: hearing-aid left, hearing-aid right    Cardiovascular:   Exercise tolerance: poor Hypertension CAD      Pulmonary:   Asthma mild    Renal/:  Renal/ Normal     Hepatic/GI:   GERD    Musculoskeletal:  Musculoskeletal Normal    Neurological:  Neurology Normal    Endocrine:  Endocrine Normal    Dermatological:  Skin Normal    Psych:  Psychiatric Normal           Physical Exam  General:  Morbid Obesity    Airway/Jaw/Neck:  Airway Findings: Mouth Opening: Normal Tongue: Normal  General Airway Assessment: Adult  Mallampati: I  TM Distance: Normal, at least 6 cm  Jaw/Neck Findings:  Neck ROM: Normal ROM      Dental:  Dental Findings: In tact             Anesthesia Plan  Type of Anesthesia, risks & benefits discussed:  Anesthesia Type:  MAC  Patient's Preference:   Intra-op Monitoring Plan: standard ASA monitors  Intra-op Monitoring Plan Comments:   Post Op Pain Control Plan: per primary service following discharge from PACU  Post Op Pain Control Plan Comments:   Induction:    Beta Blocker:         Informed Consent: Patient understands risks and agrees with Anesthesia plan.  Questions answered. Anesthesia consent signed with patient.  ASA Score: 3     Day of Surgery Review of History & Physical:    H&P update referred to the surgeon.         Ready For  Surgery From Anesthesia Perspective.

## 2020-11-09 NOTE — DISCHARGE INSTRUCTIONS
Anesthesia: Monitored Anesthesia Care (MAC)  Anesthesia safety  Tips for anesthesia safety include the following:   · Follow all instructions you are given for how long not to eat or drink before your procedure.  · Be sure your healthcare provider knows what medicines you take, especially any anti-inflammatory medicine or blood thinners. This includes aspirin and any other over-the-counter medicines, herbs, and supplements.  · Have an adult family member or friend drive you home after the procedure.  · For the first 24 hours after your surgery:  ¨ Do not drive or use heavy equipment.  ¨ Do not make important decisions or sign documents.  ¨ Avoid alcohol.  ¨ Have someone stay with you, if possible. They can watch for problems and help keep you safe.  Date Last Reviewed: 12/1/2016 © 2000-2017 [employer] [place]. [address] [place] [address], [place] [address]. All rights reserved. This information is not intended as a substitute for professional medical care. Always follow your healthcare professional's instructions.

## 2020-11-10 ENCOUNTER — OFFICE VISIT (OUTPATIENT)
Dept: OPHTHALMOLOGY | Facility: CLINIC | Age: 65
End: 2020-11-10
Attending: OPHTHALMOLOGY
Payer: MEDICARE

## 2020-11-10 DIAGNOSIS — H25.13 NUCLEAR SCLEROSIS, BILATERAL: ICD-10-CM

## 2020-11-10 DIAGNOSIS — Z98.890 POSTOPERATIVE STATE: Primary | ICD-10-CM

## 2020-11-10 PROCEDURE — 99024 PR POST-OP FOLLOW-UP VISIT: ICD-10-PCS | Mod: HCNC,S$GLB,, | Performed by: OPHTHALMOLOGY

## 2020-11-10 PROCEDURE — 99024 POSTOP FOLLOW-UP VISIT: CPT | Mod: HCNC,S$GLB,, | Performed by: OPHTHALMOLOGY

## 2020-11-10 PROCEDURE — 99999 PR PBB SHADOW E&M-EST. PATIENT-LVL III: ICD-10-PCS | Mod: PBBFAC,HCNC,, | Performed by: OPHTHALMOLOGY

## 2020-11-10 PROCEDURE — 99999 PR PBB SHADOW E&M-EST. PATIENT-LVL III: CPT | Mod: PBBFAC,HCNC,, | Performed by: OPHTHALMOLOGY

## 2020-11-10 NOTE — PROGRESS NOTES
HPI     POD 1 Cataract ext with IOL OD     Pt reports happy with his vision , no eye pain   Using gtts as instructed OD       Gtts  PGB TID OD     Ocular hx   Pseudo OS 03/26/2019    Last edited by Monica Schaefer on 11/10/2020 10:04 AM. (History)            Assessment /Plan     For exam results, see Encounter Report.    Postoperative state    Nuclear sclerosis, bilateral      Slit lamp exam:  L/L: nl  K: clear, wound sealed  AC: 1+ cell  Lens: IOL centered and stable    POD1 s/p Phaco/IOL  Appropriate precautions and post op medications reviewed.  Patient instructed to call or come in if symptoms of redness, decreased vision, or pain are experienced.

## 2020-12-18 ENCOUNTER — OFFICE VISIT (OUTPATIENT)
Dept: OPTOMETRY | Facility: CLINIC | Age: 65
End: 2020-12-18
Payer: MEDICARE

## 2020-12-18 DIAGNOSIS — Z98.41 STATUS POST RIGHT CATARACT EXTRACTION: Primary | ICD-10-CM

## 2020-12-18 PROCEDURE — 99999 PR PBB SHADOW E&M-EST. PATIENT-LVL II: CPT | Mod: PBBFAC,HCNC,, | Performed by: OPTOMETRIST

## 2020-12-18 PROCEDURE — 1126F PR PAIN SEVERITY QUANTIFIED, NO PAIN PRESENT: ICD-10-PCS | Mod: HCNC,S$GLB,, | Performed by: OPTOMETRIST

## 2020-12-18 PROCEDURE — 1101F PT FALLS ASSESS-DOCD LE1/YR: CPT | Mod: HCNC,CPTII,S$GLB, | Performed by: OPTOMETRIST

## 2020-12-18 PROCEDURE — 1101F PR PT FALLS ASSESS DOC 0-1 FALLS W/OUT INJ PAST YR: ICD-10-PCS | Mod: HCNC,CPTII,S$GLB, | Performed by: OPTOMETRIST

## 2020-12-18 PROCEDURE — 99024 PR POST-OP FOLLOW-UP VISIT: ICD-10-PCS | Mod: HCNC,S$GLB,, | Performed by: OPTOMETRIST

## 2020-12-18 PROCEDURE — 99024 POSTOP FOLLOW-UP VISIT: CPT | Mod: HCNC,S$GLB,, | Performed by: OPTOMETRIST

## 2020-12-18 PROCEDURE — 1126F AMNT PAIN NOTED NONE PRSNT: CPT | Mod: HCNC,S$GLB,, | Performed by: OPTOMETRIST

## 2020-12-18 PROCEDURE — 3288F FALL RISK ASSESSMENT DOCD: CPT | Mod: HCNC,CPTII,S$GLB, | Performed by: OPTOMETRIST

## 2020-12-18 PROCEDURE — 3288F PR FALLS RISK ASSESSMENT DOCUMENTED: ICD-10-PCS | Mod: HCNC,CPTII,S$GLB, | Performed by: OPTOMETRIST

## 2020-12-18 PROCEDURE — 99999 PR PBB SHADOW E&M-EST. PATIENT-LVL II: ICD-10-PCS | Mod: PBBFAC,HCNC,, | Performed by: OPTOMETRIST

## 2020-12-18 NOTE — PROGRESS NOTES
HPI     Post-op Evaluation      Additional comments: 5 wk phaco OD              Comments     Last eye exam was 11/10/20 with Dr. Santiago.  Patient happy with vision since cataract sx. Uses OTC readers for small   print.  Patient denies diplopia, headaches, flashes/floaters, and pain.    11/09/2020 IMPLANT: pcboo 17.0 OD  03/26/2019 Phaco OS          Last edited by Emma Wheeler on 12/18/2020  8:26 AM. (History)            Assessment /Plan     For exam results, see Encounter Report.    Status post right cataract extraction  -     OCT- Retina            1.  Pt doing well.  No rx given.  Continue with OTC readers.  No CME OU  Retina flat and intact OU--no holes, tears, breaks, or RDs.  Return care to Dr. Gerardo.

## 2021-01-11 ENCOUNTER — PATIENT MESSAGE (OUTPATIENT)
Dept: FAMILY MEDICINE | Facility: CLINIC | Age: 66
End: 2021-01-11

## 2021-01-11 ENCOUNTER — OFFICE VISIT (OUTPATIENT)
Dept: FAMILY MEDICINE | Facility: CLINIC | Age: 66
End: 2021-01-11
Payer: MEDICARE

## 2021-01-11 VITALS
OXYGEN SATURATION: 98 % | HEIGHT: 69 IN | SYSTOLIC BLOOD PRESSURE: 132 MMHG | DIASTOLIC BLOOD PRESSURE: 80 MMHG | WEIGHT: 270 LBS | HEART RATE: 74 BPM | BODY MASS INDEX: 39.99 KG/M2

## 2021-01-11 DIAGNOSIS — E80.1 PCT (PORPHYRIA CUTANEA TARDA): ICD-10-CM

## 2021-01-11 DIAGNOSIS — I25.10 CORONARY ARTERY DISEASE INVOLVING NATIVE CORONARY ARTERY OF NATIVE HEART WITHOUT ANGINA PECTORIS: ICD-10-CM

## 2021-01-11 DIAGNOSIS — J45.21 MILD INTERMITTENT ASTHMA WITH ACUTE EXACERBATION: Primary | ICD-10-CM

## 2021-01-11 DIAGNOSIS — R73.03 PREDIABETES: ICD-10-CM

## 2021-01-11 DIAGNOSIS — Z12.5 SCREENING FOR PROSTATE CANCER: ICD-10-CM

## 2021-01-11 DIAGNOSIS — E66.01 SEVERE OBESITY WITH BODY MASS INDEX (BMI) OF 35.0 TO 39.9 WITH SERIOUS COMORBIDITY: ICD-10-CM

## 2021-01-11 DIAGNOSIS — I10 ESSENTIAL HYPERTENSION: ICD-10-CM

## 2021-01-11 DIAGNOSIS — E78.00 PURE HYPERCHOLESTEROLEMIA: ICD-10-CM

## 2021-01-11 PROCEDURE — 1101F PR PT FALLS ASSESS DOC 0-1 FALLS W/OUT INJ PAST YR: ICD-10-PCS | Mod: HCNC,CPTII,S$GLB, | Performed by: FAMILY MEDICINE

## 2021-01-11 PROCEDURE — 3075F SYST BP GE 130 - 139MM HG: CPT | Mod: HCNC,CPTII,S$GLB, | Performed by: FAMILY MEDICINE

## 2021-01-11 PROCEDURE — 1126F PR PAIN SEVERITY QUANTIFIED, NO PAIN PRESENT: ICD-10-PCS | Mod: HCNC,S$GLB,, | Performed by: FAMILY MEDICINE

## 2021-01-11 PROCEDURE — 3288F FALL RISK ASSESSMENT DOCD: CPT | Mod: HCNC,CPTII,S$GLB, | Performed by: FAMILY MEDICINE

## 2021-01-11 PROCEDURE — 99499 UNLISTED E&M SERVICE: CPT | Mod: HCNC,S$GLB,, | Performed by: FAMILY MEDICINE

## 2021-01-11 PROCEDURE — 99499 RISK ADDL DX/OHS AUDIT: ICD-10-PCS | Mod: HCNC,S$GLB,, | Performed by: FAMILY MEDICINE

## 2021-01-11 PROCEDURE — 3008F BODY MASS INDEX DOCD: CPT | Mod: HCNC,CPTII,S$GLB, | Performed by: FAMILY MEDICINE

## 2021-01-11 PROCEDURE — 96372 THER/PROPH/DIAG INJ SC/IM: CPT | Mod: HCNC,S$GLB,, | Performed by: FAMILY MEDICINE

## 2021-01-11 PROCEDURE — 3008F PR BODY MASS INDEX (BMI) DOCUMENTED: ICD-10-PCS | Mod: HCNC,CPTII,S$GLB, | Performed by: FAMILY MEDICINE

## 2021-01-11 PROCEDURE — 99999 PR PBB SHADOW E&M-EST. PATIENT-LVL IV: CPT | Mod: PBBFAC,HCNC,, | Performed by: FAMILY MEDICINE

## 2021-01-11 PROCEDURE — 99999 PR PBB SHADOW E&M-EST. PATIENT-LVL IV: ICD-10-PCS | Mod: PBBFAC,HCNC,, | Performed by: FAMILY MEDICINE

## 2021-01-11 PROCEDURE — 99214 PR OFFICE/OUTPT VISIT, EST, LEVL IV, 30-39 MIN: ICD-10-PCS | Mod: HCNC,25,S$GLB, | Performed by: FAMILY MEDICINE

## 2021-01-11 PROCEDURE — 3075F PR MOST RECENT SYSTOLIC BLOOD PRESS GE 130-139MM HG: ICD-10-PCS | Mod: HCNC,CPTII,S$GLB, | Performed by: FAMILY MEDICINE

## 2021-01-11 PROCEDURE — 3079F PR MOST RECENT DIASTOLIC BLOOD PRESSURE 80-89 MM HG: ICD-10-PCS | Mod: HCNC,CPTII,S$GLB, | Performed by: FAMILY MEDICINE

## 2021-01-11 PROCEDURE — 96372 PR INJECTION,THERAP/PROPH/DIAG2ST, IM OR SUBCUT: ICD-10-PCS | Mod: HCNC,S$GLB,, | Performed by: FAMILY MEDICINE

## 2021-01-11 PROCEDURE — 1126F AMNT PAIN NOTED NONE PRSNT: CPT | Mod: HCNC,S$GLB,, | Performed by: FAMILY MEDICINE

## 2021-01-11 PROCEDURE — 1101F PT FALLS ASSESS-DOCD LE1/YR: CPT | Mod: HCNC,CPTII,S$GLB, | Performed by: FAMILY MEDICINE

## 2021-01-11 PROCEDURE — 3288F PR FALLS RISK ASSESSMENT DOCUMENTED: ICD-10-PCS | Mod: HCNC,CPTII,S$GLB, | Performed by: FAMILY MEDICINE

## 2021-01-11 PROCEDURE — 99214 OFFICE O/P EST MOD 30 MIN: CPT | Mod: HCNC,25,S$GLB, | Performed by: FAMILY MEDICINE

## 2021-01-11 PROCEDURE — 3079F DIAST BP 80-89 MM HG: CPT | Mod: HCNC,CPTII,S$GLB, | Performed by: FAMILY MEDICINE

## 2021-01-11 RX ORDER — ALBUTEROL SULFATE 90 UG/1
2 AEROSOL, METERED RESPIRATORY (INHALATION) EVERY 6 HOURS PRN
Qty: 18 G | Refills: 5 | Status: SHIPPED | OUTPATIENT
Start: 2021-01-11 | End: 2022-01-20 | Stop reason: SDUPTHER

## 2021-01-11 RX ORDER — BETAMETHASONE SODIUM PHOSPHATE AND BETAMETHASONE ACETATE 3; 3 MG/ML; MG/ML
6 INJECTION, SUSPENSION INTRA-ARTICULAR; INTRALESIONAL; INTRAMUSCULAR; SOFT TISSUE
Status: COMPLETED | OUTPATIENT
Start: 2021-01-11 | End: 2021-01-11

## 2021-01-11 RX ADMIN — BETAMETHASONE SODIUM PHOSPHATE AND BETAMETHASONE ACETATE 6 MG: 3; 3 INJECTION, SUSPENSION INTRA-ARTICULAR; INTRALESIONAL; INTRAMUSCULAR; SOFT TISSUE at 08:01

## 2021-03-10 ENCOUNTER — PATIENT MESSAGE (OUTPATIENT)
Dept: FAMILY MEDICINE | Facility: CLINIC | Age: 66
End: 2021-03-10

## 2021-05-06 ENCOUNTER — PATIENT MESSAGE (OUTPATIENT)
Dept: RESEARCH | Facility: HOSPITAL | Age: 66
End: 2021-05-06

## 2021-05-25 ENCOUNTER — OFFICE VISIT (OUTPATIENT)
Dept: URGENT CARE | Facility: CLINIC | Age: 66
End: 2021-05-25
Payer: MEDICARE

## 2021-05-25 VITALS
SYSTOLIC BLOOD PRESSURE: 139 MMHG | HEART RATE: 89 BPM | BODY MASS INDEX: 38.36 KG/M2 | HEIGHT: 69 IN | DIASTOLIC BLOOD PRESSURE: 65 MMHG | WEIGHT: 259 LBS | TEMPERATURE: 98 F | OXYGEN SATURATION: 96 % | RESPIRATION RATE: 18 BRPM

## 2021-05-25 DIAGNOSIS — J06.9 VIRAL URI WITH COUGH: Primary | ICD-10-CM

## 2021-05-25 PROCEDURE — 99214 PR OFFICE/OUTPT VISIT, EST, LEVL IV, 30-39 MIN: ICD-10-PCS | Mod: S$GLB,,, | Performed by: NURSE PRACTITIONER

## 2021-05-25 PROCEDURE — 3008F BODY MASS INDEX DOCD: CPT | Mod: CPTII,S$GLB,, | Performed by: NURSE PRACTITIONER

## 2021-05-25 PROCEDURE — 3008F PR BODY MASS INDEX (BMI) DOCUMENTED: ICD-10-PCS | Mod: CPTII,S$GLB,, | Performed by: NURSE PRACTITIONER

## 2021-05-25 PROCEDURE — 99214 OFFICE O/P EST MOD 30 MIN: CPT | Mod: S$GLB,,, | Performed by: NURSE PRACTITIONER

## 2021-06-01 LAB — GASTROCULT GAST QL: NEGATIVE

## 2021-06-30 ENCOUNTER — OFFICE VISIT (OUTPATIENT)
Dept: FAMILY MEDICINE | Facility: CLINIC | Age: 66
End: 2021-06-30
Payer: MEDICARE

## 2021-06-30 ENCOUNTER — TELEPHONE (OUTPATIENT)
Dept: FAMILY MEDICINE | Facility: CLINIC | Age: 66
End: 2021-06-30

## 2021-06-30 VITALS
TEMPERATURE: 99 F | OXYGEN SATURATION: 98 % | HEIGHT: 69 IN | DIASTOLIC BLOOD PRESSURE: 70 MMHG | WEIGHT: 265 LBS | HEART RATE: 67 BPM | SYSTOLIC BLOOD PRESSURE: 124 MMHG | RESPIRATION RATE: 18 BRPM | BODY MASS INDEX: 39.25 KG/M2

## 2021-06-30 DIAGNOSIS — M25.561 ACUTE PAIN OF RIGHT KNEE: Primary | ICD-10-CM

## 2021-06-30 PROCEDURE — 1101F PR PT FALLS ASSESS DOC 0-1 FALLS W/OUT INJ PAST YR: ICD-10-PCS | Mod: CPTII,S$GLB,, | Performed by: FAMILY MEDICINE

## 2021-06-30 PROCEDURE — 3008F BODY MASS INDEX DOCD: CPT | Mod: CPTII,S$GLB,, | Performed by: FAMILY MEDICINE

## 2021-06-30 PROCEDURE — 3288F PR FALLS RISK ASSESSMENT DOCUMENTED: ICD-10-PCS | Mod: CPTII,S$GLB,, | Performed by: FAMILY MEDICINE

## 2021-06-30 PROCEDURE — 1159F PR MEDICATION LIST DOCUMENTED IN MEDICAL RECORD: ICD-10-PCS | Mod: S$GLB,,, | Performed by: FAMILY MEDICINE

## 2021-06-30 PROCEDURE — 99214 OFFICE O/P EST MOD 30 MIN: CPT | Mod: S$GLB,,, | Performed by: FAMILY MEDICINE

## 2021-06-30 PROCEDURE — 99214 PR OFFICE/OUTPT VISIT, EST, LEVL IV, 30-39 MIN: ICD-10-PCS | Mod: S$GLB,,, | Performed by: FAMILY MEDICINE

## 2021-06-30 PROCEDURE — 99999 PR PBB SHADOW E&M-EST. PATIENT-LVL IV: ICD-10-PCS | Mod: PBBFAC,,, | Performed by: FAMILY MEDICINE

## 2021-06-30 PROCEDURE — 1101F PT FALLS ASSESS-DOCD LE1/YR: CPT | Mod: CPTII,S$GLB,, | Performed by: FAMILY MEDICINE

## 2021-06-30 PROCEDURE — 99999 PR PBB SHADOW E&M-EST. PATIENT-LVL IV: CPT | Mod: PBBFAC,,, | Performed by: FAMILY MEDICINE

## 2021-06-30 PROCEDURE — 3288F FALL RISK ASSESSMENT DOCD: CPT | Mod: CPTII,S$GLB,, | Performed by: FAMILY MEDICINE

## 2021-06-30 PROCEDURE — 1125F PR PAIN SEVERITY QUANTIFIED, PAIN PRESENT: ICD-10-PCS | Mod: S$GLB,,, | Performed by: FAMILY MEDICINE

## 2021-06-30 PROCEDURE — 1159F MED LIST DOCD IN RCRD: CPT | Mod: S$GLB,,, | Performed by: FAMILY MEDICINE

## 2021-06-30 PROCEDURE — 3008F PR BODY MASS INDEX (BMI) DOCUMENTED: ICD-10-PCS | Mod: CPTII,S$GLB,, | Performed by: FAMILY MEDICINE

## 2021-06-30 PROCEDURE — 1125F AMNT PAIN NOTED PAIN PRSNT: CPT | Mod: S$GLB,,, | Performed by: FAMILY MEDICINE

## 2021-06-30 RX ORDER — DICLOFENAC SODIUM 10 MG/G
2 GEL TOPICAL 4 TIMES DAILY PRN
Qty: 100 G | Refills: 0 | Status: SHIPPED | OUTPATIENT
Start: 2021-06-30 | End: 2022-02-02 | Stop reason: SDUPTHER

## 2021-07-01 ENCOUNTER — TELEPHONE (OUTPATIENT)
Dept: FAMILY MEDICINE | Facility: CLINIC | Age: 66
End: 2021-07-01

## 2021-07-06 ENCOUNTER — PATIENT OUTREACH (OUTPATIENT)
Dept: ADMINISTRATIVE | Facility: HOSPITAL | Age: 66
End: 2021-07-06

## 2021-07-06 ENCOUNTER — TELEPHONE (OUTPATIENT)
Dept: FAMILY MEDICINE | Facility: CLINIC | Age: 66
End: 2021-07-06

## 2021-07-07 ENCOUNTER — TELEPHONE (OUTPATIENT)
Dept: FAMILY MEDICINE | Facility: CLINIC | Age: 66
End: 2021-07-07

## 2021-07-13 ENCOUNTER — TELEPHONE (OUTPATIENT)
Dept: FAMILY MEDICINE | Facility: CLINIC | Age: 66
End: 2021-07-13

## 2021-07-20 ENCOUNTER — OFFICE VISIT (OUTPATIENT)
Dept: FAMILY MEDICINE | Facility: CLINIC | Age: 66
End: 2021-07-20
Payer: MEDICARE

## 2021-07-20 VITALS
HEART RATE: 60 BPM | WEIGHT: 264 LBS | OXYGEN SATURATION: 98 % | SYSTOLIC BLOOD PRESSURE: 124 MMHG | HEIGHT: 69 IN | DIASTOLIC BLOOD PRESSURE: 72 MMHG | BODY MASS INDEX: 39.1 KG/M2 | TEMPERATURE: 98 F | RESPIRATION RATE: 18 BRPM

## 2021-07-20 DIAGNOSIS — E66.01 SEVERE OBESITY WITH BODY MASS INDEX (BMI) OF 35.0 TO 39.9 WITH SERIOUS COMORBIDITY: ICD-10-CM

## 2021-07-20 DIAGNOSIS — R73.03 PREDIABETES: ICD-10-CM

## 2021-07-20 DIAGNOSIS — J45.20 MILD INTERMITTENT ASTHMA WITHOUT COMPLICATION: ICD-10-CM

## 2021-07-20 DIAGNOSIS — R79.89 ELEVATED LFTS: ICD-10-CM

## 2021-07-20 DIAGNOSIS — E78.00 PURE HYPERCHOLESTEROLEMIA: ICD-10-CM

## 2021-07-20 DIAGNOSIS — I10 ESSENTIAL HYPERTENSION: Primary | ICD-10-CM

## 2021-07-20 DIAGNOSIS — I25.10 CORONARY ARTERY DISEASE INVOLVING NATIVE CORONARY ARTERY OF NATIVE HEART WITHOUT ANGINA PECTORIS: ICD-10-CM

## 2021-07-20 PROCEDURE — 99214 PR OFFICE/OUTPT VISIT, EST, LEVL IV, 30-39 MIN: ICD-10-PCS | Mod: S$GLB,,, | Performed by: FAMILY MEDICINE

## 2021-07-20 PROCEDURE — 99499 UNLISTED E&M SERVICE: CPT | Mod: HCNC,S$GLB,, | Performed by: FAMILY MEDICINE

## 2021-07-20 PROCEDURE — 99999 PR PBB SHADOW E&M-EST. PATIENT-LVL IV: ICD-10-PCS | Mod: PBBFAC,,, | Performed by: FAMILY MEDICINE

## 2021-07-20 PROCEDURE — 1126F PR PAIN SEVERITY QUANTIFIED, NO PAIN PRESENT: ICD-10-PCS | Mod: CPTII,S$GLB,, | Performed by: FAMILY MEDICINE

## 2021-07-20 PROCEDURE — 3078F DIAST BP <80 MM HG: CPT | Mod: CPTII,S$GLB,, | Performed by: FAMILY MEDICINE

## 2021-07-20 PROCEDURE — 3074F SYST BP LT 130 MM HG: CPT | Mod: CPTII,S$GLB,, | Performed by: FAMILY MEDICINE

## 2021-07-20 PROCEDURE — 99999 PR PBB SHADOW E&M-EST. PATIENT-LVL IV: CPT | Mod: PBBFAC,,, | Performed by: FAMILY MEDICINE

## 2021-07-20 PROCEDURE — 1101F PR PT FALLS ASSESS DOC 0-1 FALLS W/OUT INJ PAST YR: ICD-10-PCS | Mod: CPTII,S$GLB,, | Performed by: FAMILY MEDICINE

## 2021-07-20 PROCEDURE — 99214 OFFICE O/P EST MOD 30 MIN: CPT | Mod: S$GLB,,, | Performed by: FAMILY MEDICINE

## 2021-07-20 PROCEDURE — 1159F PR MEDICATION LIST DOCUMENTED IN MEDICAL RECORD: ICD-10-PCS | Mod: CPTII,S$GLB,, | Performed by: FAMILY MEDICINE

## 2021-07-20 PROCEDURE — 3008F BODY MASS INDEX DOCD: CPT | Mod: CPTII,S$GLB,, | Performed by: FAMILY MEDICINE

## 2021-07-20 PROCEDURE — 3008F PR BODY MASS INDEX (BMI) DOCUMENTED: ICD-10-PCS | Mod: CPTII,S$GLB,, | Performed by: FAMILY MEDICINE

## 2021-07-20 PROCEDURE — 3074F PR MOST RECENT SYSTOLIC BLOOD PRESSURE < 130 MM HG: ICD-10-PCS | Mod: CPTII,S$GLB,, | Performed by: FAMILY MEDICINE

## 2021-07-20 PROCEDURE — 3288F PR FALLS RISK ASSESSMENT DOCUMENTED: ICD-10-PCS | Mod: CPTII,S$GLB,, | Performed by: FAMILY MEDICINE

## 2021-07-20 PROCEDURE — 3078F PR MOST RECENT DIASTOLIC BLOOD PRESSURE < 80 MM HG: ICD-10-PCS | Mod: CPTII,S$GLB,, | Performed by: FAMILY MEDICINE

## 2021-07-20 PROCEDURE — 1159F MED LIST DOCD IN RCRD: CPT | Mod: CPTII,S$GLB,, | Performed by: FAMILY MEDICINE

## 2021-07-20 PROCEDURE — 99499 RISK ADDL DX/OHS AUDIT: ICD-10-PCS | Mod: HCNC,S$GLB,, | Performed by: FAMILY MEDICINE

## 2021-07-20 PROCEDURE — 1101F PT FALLS ASSESS-DOCD LE1/YR: CPT | Mod: CPTII,S$GLB,, | Performed by: FAMILY MEDICINE

## 2021-07-20 PROCEDURE — 1126F AMNT PAIN NOTED NONE PRSNT: CPT | Mod: CPTII,S$GLB,, | Performed by: FAMILY MEDICINE

## 2021-07-20 PROCEDURE — 3288F FALL RISK ASSESSMENT DOCD: CPT | Mod: CPTII,S$GLB,, | Performed by: FAMILY MEDICINE

## 2021-07-20 RX ORDER — NAPROXEN SODIUM 220 MG/1
81 TABLET, FILM COATED ORAL DAILY
Qty: 90 TABLET | Refills: 11
Start: 2021-07-20 | End: 2024-02-22

## 2021-08-09 ENCOUNTER — PATIENT OUTREACH (OUTPATIENT)
Dept: ADMINISTRATIVE | Facility: OTHER | Age: 66
End: 2021-08-09

## 2021-08-10 ENCOUNTER — OFFICE VISIT (OUTPATIENT)
Dept: ORTHOPEDICS | Facility: CLINIC | Age: 66
End: 2021-08-10
Payer: MEDICARE

## 2021-08-10 VITALS — BODY MASS INDEX: 39.08 KG/M2 | HEIGHT: 69 IN | WEIGHT: 263.88 LBS

## 2021-08-10 DIAGNOSIS — T14.8XXA CONTUSION OF BONE: Primary | ICD-10-CM

## 2021-08-10 DIAGNOSIS — M17.11 PRIMARY OSTEOARTHRITIS OF RIGHT KNEE: ICD-10-CM

## 2021-08-10 DIAGNOSIS — M25.561 ACUTE PAIN OF RIGHT KNEE: ICD-10-CM

## 2021-08-10 PROCEDURE — 99202 PR OFFICE/OUTPT VISIT, NEW, LEVL II, 15-29 MIN: ICD-10-PCS | Mod: S$GLB,,, | Performed by: ORTHOPAEDIC SURGERY

## 2021-08-10 PROCEDURE — 1101F PR PT FALLS ASSESS DOC 0-1 FALLS W/OUT INJ PAST YR: ICD-10-PCS | Mod: CPTII,S$GLB,, | Performed by: ORTHOPAEDIC SURGERY

## 2021-08-10 PROCEDURE — 3288F FALL RISK ASSESSMENT DOCD: CPT | Mod: CPTII,S$GLB,, | Performed by: ORTHOPAEDIC SURGERY

## 2021-08-10 PROCEDURE — 3288F PR FALLS RISK ASSESSMENT DOCUMENTED: ICD-10-PCS | Mod: CPTII,S$GLB,, | Performed by: ORTHOPAEDIC SURGERY

## 2021-08-10 PROCEDURE — 3044F PR MOST RECENT HEMOGLOBIN A1C LEVEL <7.0%: ICD-10-PCS | Mod: CPTII,S$GLB,, | Performed by: ORTHOPAEDIC SURGERY

## 2021-08-10 PROCEDURE — 1125F PR PAIN SEVERITY QUANTIFIED, PAIN PRESENT: ICD-10-PCS | Mod: CPTII,S$GLB,, | Performed by: ORTHOPAEDIC SURGERY

## 2021-08-10 PROCEDURE — 1125F AMNT PAIN NOTED PAIN PRSNT: CPT | Mod: CPTII,S$GLB,, | Performed by: ORTHOPAEDIC SURGERY

## 2021-08-10 PROCEDURE — 3044F HG A1C LEVEL LT 7.0%: CPT | Mod: CPTII,S$GLB,, | Performed by: ORTHOPAEDIC SURGERY

## 2021-08-10 PROCEDURE — 3008F PR BODY MASS INDEX (BMI) DOCUMENTED: ICD-10-PCS | Mod: CPTII,S$GLB,, | Performed by: ORTHOPAEDIC SURGERY

## 2021-08-10 PROCEDURE — 99202 OFFICE O/P NEW SF 15 MIN: CPT | Mod: S$GLB,,, | Performed by: ORTHOPAEDIC SURGERY

## 2021-08-10 PROCEDURE — 1101F PT FALLS ASSESS-DOCD LE1/YR: CPT | Mod: CPTII,S$GLB,, | Performed by: ORTHOPAEDIC SURGERY

## 2021-08-10 PROCEDURE — 1159F MED LIST DOCD IN RCRD: CPT | Mod: CPTII,S$GLB,, | Performed by: ORTHOPAEDIC SURGERY

## 2021-08-10 PROCEDURE — 1160F PR REVIEW ALL MEDS BY PRESCRIBER/CLIN PHARMACIST DOCUMENTED: ICD-10-PCS | Mod: CPTII,S$GLB,, | Performed by: ORTHOPAEDIC SURGERY

## 2021-08-10 PROCEDURE — 1160F RVW MEDS BY RX/DR IN RCRD: CPT | Mod: CPTII,S$GLB,, | Performed by: ORTHOPAEDIC SURGERY

## 2021-08-10 PROCEDURE — 1159F PR MEDICATION LIST DOCUMENTED IN MEDICAL RECORD: ICD-10-PCS | Mod: CPTII,S$GLB,, | Performed by: ORTHOPAEDIC SURGERY

## 2021-08-10 PROCEDURE — 99999 PR PBB SHADOW E&M-EST. PATIENT-LVL III: ICD-10-PCS | Mod: PBBFAC,,, | Performed by: ORTHOPAEDIC SURGERY

## 2021-08-10 PROCEDURE — 99999 PR PBB SHADOW E&M-EST. PATIENT-LVL III: CPT | Mod: PBBFAC,,, | Performed by: ORTHOPAEDIC SURGERY

## 2021-08-10 PROCEDURE — 3008F BODY MASS INDEX DOCD: CPT | Mod: CPTII,S$GLB,, | Performed by: ORTHOPAEDIC SURGERY

## 2021-08-10 RX ORDER — IBUPROFEN 800 MG/1
800 TABLET ORAL 3 TIMES DAILY
Qty: 90 TABLET | Refills: 1 | Status: SHIPPED | OUTPATIENT
Start: 2021-08-10

## 2021-08-10 RX ORDER — IBUPROFEN 800 MG/1
TABLET ORAL
COMMUNITY
Start: 2021-07-29 | End: 2021-08-10 | Stop reason: ALTCHOICE

## 2021-09-16 ENCOUNTER — PATIENT OUTREACH (OUTPATIENT)
Dept: ADMINISTRATIVE | Facility: HOSPITAL | Age: 66
End: 2021-09-16

## 2021-09-22 ENCOUNTER — OFFICE VISIT (OUTPATIENT)
Dept: ORTHOPEDICS | Facility: CLINIC | Age: 66
End: 2021-09-22
Payer: MEDICARE

## 2021-09-22 VITALS
SYSTOLIC BLOOD PRESSURE: 124 MMHG | HEART RATE: 76 BPM | RESPIRATION RATE: 18 BRPM | DIASTOLIC BLOOD PRESSURE: 80 MMHG | BODY MASS INDEX: 37.66 KG/M2 | WEIGHT: 255 LBS

## 2021-09-22 DIAGNOSIS — M17.11 PRIMARY OSTEOARTHRITIS OF RIGHT KNEE: Primary | ICD-10-CM

## 2021-09-22 PROCEDURE — 3044F PR MOST RECENT HEMOGLOBIN A1C LEVEL <7.0%: ICD-10-PCS | Mod: HCNC,CPTII,S$GLB, | Performed by: PHYSICIAN ASSISTANT

## 2021-09-22 PROCEDURE — 99999 PR PBB SHADOW E&M-EST. PATIENT-LVL III: CPT | Mod: PBBFAC,HCNC,, | Performed by: PHYSICIAN ASSISTANT

## 2021-09-22 PROCEDURE — 1101F PR PT FALLS ASSESS DOC 0-1 FALLS W/OUT INJ PAST YR: ICD-10-PCS | Mod: HCNC,CPTII,S$GLB, | Performed by: PHYSICIAN ASSISTANT

## 2021-09-22 PROCEDURE — 99999 PR PBB SHADOW E&M-EST. PATIENT-LVL III: ICD-10-PCS | Mod: PBBFAC,HCNC,, | Performed by: PHYSICIAN ASSISTANT

## 2021-09-22 PROCEDURE — 3008F BODY MASS INDEX DOCD: CPT | Mod: HCNC,CPTII,S$GLB, | Performed by: PHYSICIAN ASSISTANT

## 2021-09-22 PROCEDURE — 3288F PR FALLS RISK ASSESSMENT DOCUMENTED: ICD-10-PCS | Mod: HCNC,CPTII,S$GLB, | Performed by: PHYSICIAN ASSISTANT

## 2021-09-22 PROCEDURE — 1125F AMNT PAIN NOTED PAIN PRSNT: CPT | Mod: HCNC,CPTII,S$GLB, | Performed by: PHYSICIAN ASSISTANT

## 2021-09-22 PROCEDURE — 4010F ACE/ARB THERAPY RXD/TAKEN: CPT | Mod: HCNC,CPTII,S$GLB, | Performed by: PHYSICIAN ASSISTANT

## 2021-09-22 PROCEDURE — 3074F PR MOST RECENT SYSTOLIC BLOOD PRESSURE < 130 MM HG: ICD-10-PCS | Mod: HCNC,CPTII,S$GLB, | Performed by: PHYSICIAN ASSISTANT

## 2021-09-22 PROCEDURE — 3044F HG A1C LEVEL LT 7.0%: CPT | Mod: HCNC,CPTII,S$GLB, | Performed by: PHYSICIAN ASSISTANT

## 2021-09-22 PROCEDURE — 3074F SYST BP LT 130 MM HG: CPT | Mod: HCNC,CPTII,S$GLB, | Performed by: PHYSICIAN ASSISTANT

## 2021-09-22 PROCEDURE — 3079F DIAST BP 80-89 MM HG: CPT | Mod: HCNC,CPTII,S$GLB, | Performed by: PHYSICIAN ASSISTANT

## 2021-09-22 PROCEDURE — 3079F PR MOST RECENT DIASTOLIC BLOOD PRESSURE 80-89 MM HG: ICD-10-PCS | Mod: HCNC,CPTII,S$GLB, | Performed by: PHYSICIAN ASSISTANT

## 2021-09-22 PROCEDURE — 1159F PR MEDICATION LIST DOCUMENTED IN MEDICAL RECORD: ICD-10-PCS | Mod: HCNC,CPTII,S$GLB, | Performed by: PHYSICIAN ASSISTANT

## 2021-09-22 PROCEDURE — 1101F PT FALLS ASSESS-DOCD LE1/YR: CPT | Mod: HCNC,CPTII,S$GLB, | Performed by: PHYSICIAN ASSISTANT

## 2021-09-22 PROCEDURE — 1125F PR PAIN SEVERITY QUANTIFIED, PAIN PRESENT: ICD-10-PCS | Mod: HCNC,CPTII,S$GLB, | Performed by: PHYSICIAN ASSISTANT

## 2021-09-22 PROCEDURE — 99213 OFFICE O/P EST LOW 20 MIN: CPT | Mod: HCNC,S$GLB,, | Performed by: PHYSICIAN ASSISTANT

## 2021-09-22 PROCEDURE — 3288F FALL RISK ASSESSMENT DOCD: CPT | Mod: HCNC,CPTII,S$GLB, | Performed by: PHYSICIAN ASSISTANT

## 2021-09-22 PROCEDURE — 1160F PR REVIEW ALL MEDS BY PRESCRIBER/CLIN PHARMACIST DOCUMENTED: ICD-10-PCS | Mod: HCNC,CPTII,S$GLB, | Performed by: PHYSICIAN ASSISTANT

## 2021-09-22 PROCEDURE — 1160F RVW MEDS BY RX/DR IN RCRD: CPT | Mod: HCNC,CPTII,S$GLB, | Performed by: PHYSICIAN ASSISTANT

## 2021-09-22 PROCEDURE — 4010F PR ACE/ARB THEARPY RXD/TAKEN: ICD-10-PCS | Mod: HCNC,CPTII,S$GLB, | Performed by: PHYSICIAN ASSISTANT

## 2021-09-22 PROCEDURE — 99213 PR OFFICE/OUTPT VISIT, EST, LEVL III, 20-29 MIN: ICD-10-PCS | Mod: HCNC,S$GLB,, | Performed by: PHYSICIAN ASSISTANT

## 2021-09-22 PROCEDURE — 3008F PR BODY MASS INDEX (BMI) DOCUMENTED: ICD-10-PCS | Mod: HCNC,CPTII,S$GLB, | Performed by: PHYSICIAN ASSISTANT

## 2021-09-22 PROCEDURE — 1159F MED LIST DOCD IN RCRD: CPT | Mod: HCNC,CPTII,S$GLB, | Performed by: PHYSICIAN ASSISTANT

## 2021-10-15 ENCOUNTER — TELEPHONE (OUTPATIENT)
Dept: CARDIOLOGY | Facility: CLINIC | Age: 66
End: 2021-10-15

## 2021-11-15 ENCOUNTER — PATIENT OUTREACH (OUTPATIENT)
Dept: ADMINISTRATIVE | Facility: OTHER | Age: 66
End: 2021-11-15
Payer: MEDICARE

## 2021-11-16 ENCOUNTER — OFFICE VISIT (OUTPATIENT)
Dept: CARDIOLOGY | Facility: CLINIC | Age: 66
End: 2021-11-16
Payer: MEDICARE

## 2021-11-16 VITALS
WEIGHT: 255.63 LBS | HEIGHT: 69 IN | BODY MASS INDEX: 37.86 KG/M2 | HEART RATE: 59 BPM | DIASTOLIC BLOOD PRESSURE: 74 MMHG | OXYGEN SATURATION: 98 % | SYSTOLIC BLOOD PRESSURE: 120 MMHG

## 2021-11-16 DIAGNOSIS — E78.00 PURE HYPERCHOLESTEROLEMIA: ICD-10-CM

## 2021-11-16 DIAGNOSIS — I25.10 CORONARY ARTERY DISEASE INVOLVING NATIVE CORONARY ARTERY OF NATIVE HEART WITHOUT ANGINA PECTORIS: ICD-10-CM

## 2021-11-16 DIAGNOSIS — J45.20 MILD INTERMITTENT ASTHMA WITHOUT COMPLICATION: ICD-10-CM

## 2021-11-16 DIAGNOSIS — E66.01 SEVERE OBESITY WITH BODY MASS INDEX (BMI) OF 35.0 TO 39.9 WITH SERIOUS COMORBIDITY: ICD-10-CM

## 2021-11-16 DIAGNOSIS — R73.03 PREDIABETES: ICD-10-CM

## 2021-11-16 DIAGNOSIS — I10 ESSENTIAL HYPERTENSION: ICD-10-CM

## 2021-11-16 PROCEDURE — 3074F SYST BP LT 130 MM HG: CPT | Mod: HCNC,CPTII,S$GLB, | Performed by: INTERNAL MEDICINE

## 2021-11-16 PROCEDURE — 3044F PR MOST RECENT HEMOGLOBIN A1C LEVEL <7.0%: ICD-10-PCS | Mod: HCNC,CPTII,S$GLB, | Performed by: INTERNAL MEDICINE

## 2021-11-16 PROCEDURE — G0008 ADMIN INFLUENZA VIRUS VAC: HCPCS | Mod: HCNC,S$GLB,, | Performed by: INTERNAL MEDICINE

## 2021-11-16 PROCEDURE — 1126F PR PAIN SEVERITY QUANTIFIED, NO PAIN PRESENT: ICD-10-PCS | Mod: HCNC,CPTII,S$GLB, | Performed by: INTERNAL MEDICINE

## 2021-11-16 PROCEDURE — 3288F PR FALLS RISK ASSESSMENT DOCUMENTED: ICD-10-PCS | Mod: HCNC,CPTII,S$GLB, | Performed by: INTERNAL MEDICINE

## 2021-11-16 PROCEDURE — 3074F PR MOST RECENT SYSTOLIC BLOOD PRESSURE < 130 MM HG: ICD-10-PCS | Mod: HCNC,CPTII,S$GLB, | Performed by: INTERNAL MEDICINE

## 2021-11-16 PROCEDURE — G0008 FLU VACCINE - QUADRIVALENT - ADJUVANTED: ICD-10-PCS | Mod: HCNC,S$GLB,, | Performed by: INTERNAL MEDICINE

## 2021-11-16 PROCEDURE — 3008F PR BODY MASS INDEX (BMI) DOCUMENTED: ICD-10-PCS | Mod: HCNC,CPTII,S$GLB, | Performed by: INTERNAL MEDICINE

## 2021-11-16 PROCEDURE — 1101F PR PT FALLS ASSESS DOC 0-1 FALLS W/OUT INJ PAST YR: ICD-10-PCS | Mod: HCNC,CPTII,S$GLB, | Performed by: INTERNAL MEDICINE

## 2021-11-16 PROCEDURE — 99499 RISK ADDL DX/OHS AUDIT: ICD-10-PCS | Mod: S$GLB,,, | Performed by: INTERNAL MEDICINE

## 2021-11-16 PROCEDURE — 3078F DIAST BP <80 MM HG: CPT | Mod: HCNC,CPTII,S$GLB, | Performed by: INTERNAL MEDICINE

## 2021-11-16 PROCEDURE — 3008F BODY MASS INDEX DOCD: CPT | Mod: HCNC,CPTII,S$GLB, | Performed by: INTERNAL MEDICINE

## 2021-11-16 PROCEDURE — 99214 PR OFFICE/OUTPT VISIT, EST, LEVL IV, 30-39 MIN: ICD-10-PCS | Mod: HCNC,25,S$GLB, | Performed by: INTERNAL MEDICINE

## 2021-11-16 PROCEDURE — 99499 UNLISTED E&M SERVICE: CPT | Mod: S$GLB,,, | Performed by: INTERNAL MEDICINE

## 2021-11-16 PROCEDURE — 1159F PR MEDICATION LIST DOCUMENTED IN MEDICAL RECORD: ICD-10-PCS | Mod: HCNC,CPTII,S$GLB, | Performed by: INTERNAL MEDICINE

## 2021-11-16 PROCEDURE — 3288F FALL RISK ASSESSMENT DOCD: CPT | Mod: HCNC,CPTII,S$GLB, | Performed by: INTERNAL MEDICINE

## 2021-11-16 PROCEDURE — 4010F ACE/ARB THERAPY RXD/TAKEN: CPT | Mod: HCNC,CPTII,S$GLB, | Performed by: INTERNAL MEDICINE

## 2021-11-16 PROCEDURE — 90694 FLU VACCINE - QUADRIVALENT - ADJUVANTED: ICD-10-PCS | Mod: HCNC,S$GLB,, | Performed by: INTERNAL MEDICINE

## 2021-11-16 PROCEDURE — 1126F AMNT PAIN NOTED NONE PRSNT: CPT | Mod: HCNC,CPTII,S$GLB, | Performed by: INTERNAL MEDICINE

## 2021-11-16 PROCEDURE — 99999 PR PBB SHADOW E&M-EST. PATIENT-LVL III: CPT | Mod: PBBFAC,HCNC,, | Performed by: INTERNAL MEDICINE

## 2021-11-16 PROCEDURE — 99214 OFFICE O/P EST MOD 30 MIN: CPT | Mod: HCNC,25,S$GLB, | Performed by: INTERNAL MEDICINE

## 2021-11-16 PROCEDURE — 1160F PR REVIEW ALL MEDS BY PRESCRIBER/CLIN PHARMACIST DOCUMENTED: ICD-10-PCS | Mod: HCNC,CPTII,S$GLB, | Performed by: INTERNAL MEDICINE

## 2021-11-16 PROCEDURE — 3044F HG A1C LEVEL LT 7.0%: CPT | Mod: HCNC,CPTII,S$GLB, | Performed by: INTERNAL MEDICINE

## 2021-11-16 PROCEDURE — 1101F PT FALLS ASSESS-DOCD LE1/YR: CPT | Mod: HCNC,CPTII,S$GLB, | Performed by: INTERNAL MEDICINE

## 2021-11-16 PROCEDURE — 4010F PR ACE/ARB THEARPY RXD/TAKEN: ICD-10-PCS | Mod: HCNC,CPTII,S$GLB, | Performed by: INTERNAL MEDICINE

## 2021-11-16 PROCEDURE — 1160F RVW MEDS BY RX/DR IN RCRD: CPT | Mod: HCNC,CPTII,S$GLB, | Performed by: INTERNAL MEDICINE

## 2021-11-16 PROCEDURE — 1159F MED LIST DOCD IN RCRD: CPT | Mod: HCNC,CPTII,S$GLB, | Performed by: INTERNAL MEDICINE

## 2021-11-16 PROCEDURE — 3078F PR MOST RECENT DIASTOLIC BLOOD PRESSURE < 80 MM HG: ICD-10-PCS | Mod: HCNC,CPTII,S$GLB, | Performed by: INTERNAL MEDICINE

## 2021-11-16 PROCEDURE — 99999 PR PBB SHADOW E&M-EST. PATIENT-LVL III: ICD-10-PCS | Mod: PBBFAC,HCNC,, | Performed by: INTERNAL MEDICINE

## 2021-11-16 PROCEDURE — 90694 VACC AIIV4 NO PRSRV 0.5ML IM: CPT | Mod: HCNC,S$GLB,, | Performed by: INTERNAL MEDICINE

## 2021-12-28 ENCOUNTER — OFFICE VISIT (OUTPATIENT)
Dept: OPTOMETRY | Facility: CLINIC | Age: 66
End: 2021-12-28
Payer: MEDICARE

## 2021-12-28 DIAGNOSIS — Z96.1 PSEUDOPHAKIA OF BOTH EYES: ICD-10-CM

## 2021-12-28 DIAGNOSIS — I10 ESSENTIAL HYPERTENSION: ICD-10-CM

## 2021-12-28 DIAGNOSIS — H26.493 AFTER-CATARACT OBSCURING VISION, BILATERAL: Primary | ICD-10-CM

## 2021-12-28 PROCEDURE — 1159F PR MEDICATION LIST DOCUMENTED IN MEDICAL RECORD: ICD-10-PCS | Mod: HCNC,CPTII,S$GLB, | Performed by: OPTOMETRIST

## 2021-12-28 PROCEDURE — 1101F PR PT FALLS ASSESS DOC 0-1 FALLS W/OUT INJ PAST YR: ICD-10-PCS | Mod: HCNC,CPTII,S$GLB, | Performed by: OPTOMETRIST

## 2021-12-28 PROCEDURE — 99999 PR PBB SHADOW E&M-EST. PATIENT-LVL II: CPT | Mod: PBBFAC,HCNC,, | Performed by: OPTOMETRIST

## 2021-12-28 PROCEDURE — 3044F HG A1C LEVEL LT 7.0%: CPT | Mod: HCNC,CPTII,S$GLB, | Performed by: OPTOMETRIST

## 2021-12-28 PROCEDURE — 1101F PT FALLS ASSESS-DOCD LE1/YR: CPT | Mod: HCNC,CPTII,S$GLB, | Performed by: OPTOMETRIST

## 2021-12-28 PROCEDURE — 99999 PR PBB SHADOW E&M-EST. PATIENT-LVL II: ICD-10-PCS | Mod: PBBFAC,HCNC,, | Performed by: OPTOMETRIST

## 2021-12-28 PROCEDURE — 92014 COMPRE OPH EXAM EST PT 1/>: CPT | Mod: HCNC,S$GLB,, | Performed by: OPTOMETRIST

## 2021-12-28 PROCEDURE — 3044F PR MOST RECENT HEMOGLOBIN A1C LEVEL <7.0%: ICD-10-PCS | Mod: HCNC,CPTII,S$GLB, | Performed by: OPTOMETRIST

## 2021-12-28 PROCEDURE — 3288F PR FALLS RISK ASSESSMENT DOCUMENTED: ICD-10-PCS | Mod: HCNC,CPTII,S$GLB, | Performed by: OPTOMETRIST

## 2021-12-28 PROCEDURE — 1126F AMNT PAIN NOTED NONE PRSNT: CPT | Mod: HCNC,CPTII,S$GLB, | Performed by: OPTOMETRIST

## 2021-12-28 PROCEDURE — 1159F MED LIST DOCD IN RCRD: CPT | Mod: HCNC,CPTII,S$GLB, | Performed by: OPTOMETRIST

## 2021-12-28 PROCEDURE — 1126F PR PAIN SEVERITY QUANTIFIED, NO PAIN PRESENT: ICD-10-PCS | Mod: HCNC,CPTII,S$GLB, | Performed by: OPTOMETRIST

## 2021-12-28 PROCEDURE — 3288F FALL RISK ASSESSMENT DOCD: CPT | Mod: HCNC,CPTII,S$GLB, | Performed by: OPTOMETRIST

## 2021-12-28 PROCEDURE — 92014 PR EYE EXAM, EST PATIENT,COMPREHESV: ICD-10-PCS | Mod: HCNC,S$GLB,, | Performed by: OPTOMETRIST

## 2021-12-28 PROCEDURE — 4010F ACE/ARB THERAPY RXD/TAKEN: CPT | Mod: HCNC,CPTII,S$GLB, | Performed by: OPTOMETRIST

## 2021-12-28 PROCEDURE — 4010F PR ACE/ARB THEARPY RXD/TAKEN: ICD-10-PCS | Mod: HCNC,CPTII,S$GLB, | Performed by: OPTOMETRIST

## 2022-01-19 NOTE — PROGRESS NOTES
FAMILY MEDICINE  [facility]    Reason for visit:   Chief Complaint   Patient presents with    Follow-up    Asthma    Sinus Problem       HPI: Jean Shaw Jr. is a 67 y.o. male  - with obesity, CAD (2014 angiogram proximal LAD 20% and LCA OM1 70% stenosis), hypertension, hyperlipidemia, tricuspid regurgitation, asthma, GERD and porphyria cutanea tarda presents to follow-up labs and blood pressure     Cardiologist: Dr. Lamont Byers  Ortho: Dr. Riley Varghese MD     Today he reports that he has been having issues with his asthma for last several months since he has been working on his sheet rock in his house.     He also has concerns for his hernia. He notes that he had a repair 2011 at [facility]. He reports recurrence a few years ago but last several months it has been harder to reduce and more painful when bulging. No changes in BM.      1. Hypertension     Current medication treatment:   lisinopril-hydrochlorothiazide (PRINZIDE,ZESTORETIC) 20-25 mg Tab, TAKE 1 TABLET BY MOUTH ONCE DAILY, Disp: 90 tablet, Rfl: 3     Medication side effects: denies     Home BP cuff: none      2. Hyperlipidemia with CAD LDL goal <70     Current treatment:  atorvastatin (LIPITOR) 80 MG tablet, Take 1 tablet (80 mg total) by mouth every evening., Disp: 90 tablet, Rfl: 3     Side effects from current treatment: denies     Lab Results       Component                Value               Date                       CHOL                     119 (L)             07/16/2021                 CHOL                     123                 07/02/2020                 CHOL                     125                 05/06/2019            Lab Results       Component                Value               Date                       HDL                      36 (L)              07/16/2021                 HDL                      39 (L)              07/02/2020                 HDL                      39 (L)              05/06/2019            Lab  Results       Component                Value               Date                       LDLCALC                  69.0                07/16/2021                 LDLCALC                  69.4                07/02/2020                 LDLCALC                  70.8                05/06/2019            Lab Results       Component                Value               Date                       TRIG                     70                  07/16/2021                 TRIG                     73                  07/02/2020                 TRIG                     76                  05/06/2019            Lab Results       Component                Value               Date                       CHOLHDL                  30.3                07/16/2021                 CHOLHDL                  31.7                07/02/2020                 CHOLHDL                  31.2                05/06/2019                  3. Asthma  - mild intermittent     Age diagnosed: childhood  Prior hospitalizations: denies  History of intubation: denies     Current medications  albuterol (PROVENTIL/VENTOLIN HFA) 90 mcg/actuation inhaler, Inhale 2 puffs into the lungs every 6 (six) hours as needed for Wheezing., Disp: 18 g, Rfl: 5     Current symptoms: denies  Recent exacerbations: 2021 x2 and toady 1/20/22  How often using rescue inhaler? Several times a day     Last PFT: none     Vaccines:   Influenza:  Up to date  Prevnar 13:  07/09/2020  Pneumovax 23:  07/11/2018  COVID-19:  Recommended booster                   Review of Systems   All other systems reviewed and are negative.      HISTORY:   Past Medical History:   Diagnosis Date    Allergy     Asthma     Cataract 3/14/2019    Scheduled for left cataract extraction on 03/26/2019    Combined forms of age-related cataract of both eyes 3/26/2019    Coronary artery disease     sm blockage per heart    Fever blister     GERD (gastroesophageal reflux disease)     History of left cataract extraction  5/7/2019    Hyperlipidemia     Hypertension     Nasal polyps 1/15/2015    Onychomycosis due to dermatophyte 8/28/2019       Past Surgical History:   Procedure Laterality Date    CARDIAC CATHETERIZATION  10/23/14    CATARACT EXTRACTION W/  INTRAOCULAR LENS IMPLANT Left 3/26/2019    Procedure: EXTRACTION, CATARACT, WITH IOL INSERTION;  Surgeon: Wilmar Jo MD;  Location: [facility];  Service: Ophthalmology;  Laterality: Left;    CATARACT EXTRACTION W/  INTRAOCULAR LENS IMPLANT Right 11/9/2020    Procedure: EXTRACTION, CATARACT, WITH IOL INSERTION;  Surgeon: Gladys Santiago MD;  Location: [facility];  Service: Ophthalmology;  Laterality: Right;    COLONOSCOPY      HERNIA REPAIR  2011    HERNIA REPAIR      HERNIA REPAIR      SKIN BIOPSY         Family History   Problem Relation Age of Onset    Osteoporosis Mother     Seizures Mother     Dementia Mother     Stroke Mother     Stroke Father     Diabetes Sister     No Known Problems Brother     Stroke Maternal Grandfather     No Known Problems Sister        Social History     Tobacco Use    Smoking status: Never Smoker    Smokeless tobacco: Never Used   Substance Use Topics    Alcohol use: Not Currently     Alcohol/week: 0.0 standard drinks    Drug use: No       Social History     Social History Narrative    . No children. Self-employed and does construction work. Lives with sister Fredi on their family property. Enjoys gardening and has made wine in the past. Has 3 siblings. Sister Antonia Gibson. Close with family       ALLERGIES:   Review of patient's allergies indicates:  No Known Allergies    MEDS:     Current Outpatient Medications:     aspirin 81 MG Chew, Take 1 tablet (81 mg total) by mouth once daily., Disp: 90 tablet, Rfl: 11    atorvastatin (LIPITOR) 80 MG tablet, TAKE 1 TABLET EVERY EVENING, Disp: 90 tablet, Rfl: 3    diclofenac sodium (VOLTAREN) 1 % Gel, Apply 2 g topically 4 (four) times daily as needed (knee pain)., Disp:  "100 g, Rfl: 0    ibuprofen (ADVIL,MOTRIN) 800 MG tablet, Take 1 tablet (800 mg total) by mouth 3 (three) times daily., Disp: 90 tablet, Rfl: 1    lisinopriL-hydrochlorothiazide (PRINZIDE,ZESTORETIC) 20-25 mg Tab, TAKE 1 TABLET EVERY DAY, Disp: 90 tablet, Rfl: 3    omeprazole (PRILOSEC) 20 MG capsule, TAKE 1 CAPSULE EVERY DAY, Disp: 90 capsule, Rfl: 3    triamcinolone (NASACORT) 55 mcg nasal inhaler, 2 sprays by Nasal route 2 (two) times daily., Disp: , Rfl:     albuterol (PROVENTIL/VENTOLIN HFA) 90 mcg/actuation inhaler, Inhale 2 puffs into the lungs every 6 (six) hours as needed for Wheezing., Disp: 18 g, Rfl: 5    predniSONE (DELTASONE) 50 MG Tab, Take 1 tablet (50 mg total) by mouth once daily. for 5 days, Disp: 5 tablet, Rfl: 0    Current Facility-Administered Medications:     sodium chloride 0.9% flush 10 mL, 10 mL, Intravenous, PRN, Pulruben Santiago MD    Vital signs:   Vitals:    01/20/22 0827   BP: 120/68   Pulse: 71   SpO2: 98%   Weight: 116.9 kg (257 lb 12.8 oz)   Height: 5' 9" (1.753 m)     Body mass index is 38.07 kg/m².    PHYSICAL EXAM:     Physical Exam  Constitutional:       General: He is not in acute distress.  Cardiovascular:      Rate and Rhythm: Normal rate and regular rhythm.      Pulses: Normal pulses.      Heart sounds: Normal heart sounds. No murmur heard.  No friction rub. No gallop.    Pulmonary:      Effort: Pulmonary effort is normal.      Breath sounds: Wheezing present. No rhonchi or rales.   Abdominal:      General: Bowel sounds are normal. There is no distension.      Hernia: A hernia is present. Hernia is present in the ventral area.       Musculoskeletal:      Cervical back: Neck supple.      Right lower leg: No edema.      Left lower leg: No edema.   Skin:     General: Skin is warm.   Neurological:      Mental Status: He is alert.           PHQ4 = No data recorded    PERTINENT RESULTS:   Lab Visit on 01/14/2022   Component Date Value Ref Range Status    Hemoglobin A1C " 01/14/2022 6.0* 4.0 - 5.6 % Final    Comment: ADA Screening Guidelines:  5.7-6.4%  Consistent with prediabetes  >or=6.5%  Consistent with diabetes    High levels of fetal hemoglobin interfere with the HbA1C  assay. Heterozygous hemoglobin variants (HbS, HgC, etc)do  not significantly interfere with this assay.   However, presence of multiple variants may affect accuracy.      Estimated Avg Glucose 01/14/2022 126  68 - 131 mg/dL Final    Sodium 01/14/2022 139  136 - 145 mmol/L Final    Potassium 01/14/2022 4.3  3.5 - 5.1 mmol/L Final    Chloride 01/14/2022 99  95 - 110 mmol/L Final    CO2 01/14/2022 29  23 - 29 mmol/L Final    Glucose 01/14/2022 121* 70 - 110 mg/dL Final    BUN 01/14/2022 15  2 - 20 mg/dL Final    Creatinine 01/14/2022 0.77  0.50 - 1.40 mg/dL Final    Calcium 01/14/2022 9.8  8.7 - 10.5 mg/dL Final    Total Protein 01/14/2022 7.8  6.0 - 8.4 g/dL Final    Albumin 01/14/2022 4.7  3.5 - 5.2 g/dL Final    Total Bilirubin 01/14/2022 0.6  0.1 - 1.0 mg/dL Final    Comment: For infants and newborns, interpretation of results should be based  on gestational age, weight and in agreement with clinical  observations.    Premature Infant recommended reference ranges:  Up to 24 hours.............<8.0 mg/dL  Up to 48 hours............<12.0 mg/dL  3-5 days..................<15.0 mg/dL  6-29 days.................<15.0 mg/dL      Alkaline Phosphatase 01/14/2022 101  38 - 126 U/L Final    AST 01/14/2022 37  15 - 46 U/L Final    ALT 01/14/2022 39  10 - 44 U/L Final    Anion Gap 01/14/2022 11  8 - 16 mmol/L Final    eGFR if African American 01/14/2022 >60.0  >60 mL/min/1.73 m^2 Final    eGFR if non African American 01/14/2022 >60.0  >60 mL/min/1.73 m^2 Final    Comment: Calculation used to obtain the estimated glomerular filtration  rate (eGFR) is the CKD-EPI equation.          ASSESSMENT/PLAN:  Problem List Items Addressed This Visit        ENT    Bilateral hearing loss    Overview     - evaluated by  Audiology and pending hearing aids            Pulmonary    Mild intermittent asthma with acute exacerbation    Overview     - + exacerbation  - recommend albuterol Q6 hours  - start Prednisone 50 mg daily x 5 days  - counseling regarding new medication including expected results, potential side effects, and appropriate use.  - questions elicited and answered  - encouraged to patient to notify me of any questions or concerns           Relevant Medications    albuterol (PROVENTIL/VENTOLIN HFA) 90 mcg/actuation inhaler    predniSONE (DELTASONE) 50 MG Tab       Cardiac/Vascular    Coronary artery disease involving native coronary artery of native heart without angina pectoris    Overview     - CCS: 0  - 10/23/14 Angiogram LVEDP=7mmHg, 70% OM1 stenosis,  RCA has a 30% stenosis, LAD 20% stenosis   - goal LDL <70 on high dose statin  - BP goal < 130/80  - ASA 81 mg daily  - not on Beta-blocker due to bradycardia         Relevant Orders    CBC Auto Differential    Lipid Panel    Essential hypertension - Primary    Overview     - well controlled  - continue current medications         Relevant Orders    CBC Auto Differential    Comprehensive Metabolic Panel    Lipid Panel    Non-rheumatic tricuspid valve insufficiency    Overview     Mild tricuspid regurgitation. 08/2016. Echo          Pure hypercholesterolemia    Overview     Lab Results   Component Value Date    LDLCALC 69.0 07/16/2021     - well controlled  - continue current medication         Relevant Orders    CBC Auto Differential    Comprehensive Metabolic Panel    Lipid Panel       Endocrine    PCT (porphyria cutanea tarda)    Overview     - no issues  - followed by Dermatology         Prediabetes    Overview     Lab Results   Component Value Date    HGBA1C 6.0 (H) 01/14/2022     - discussed recommendation for diet and cardiovascular exercise  - counseling on lifestyle modifications for risk factor reduction         Relevant Orders    Hemoglobin A1C       GI     Elevated LFTs    Overview     Lab Results   Component Value Date    ALT 39 01/14/2022    AST 37 01/14/2022    ALKPHOS 101 01/14/2022    BILITOT 0.6 01/14/2022     - resolved  - monitor  - discussed recommendation for diet and cardiovascular exercise  - counseling on lifestyle modifications for risk factor reduction         Relevant Orders    Comprehensive Metabolic Panel    Incisional hernia, without obstruction or gangrene    Overview     - increased symptoms and episodes of pain  - recommend Surgery evaluation         Relevant Orders    Ambulatory referral/consult to General Surgery       Other    Severe obesity with body mass index (BMI) of 35.0 to 39.9 with serious comorbidity    Overview     - discussed recommendation for diet and cardiovascular exercise  - counseling on lifestyle modifications for risk factor reduction           Other Visit Diagnoses     Screening for prostate cancer        Relevant Orders    PSA, Screening          ORDERS:   Orders Placed This Encounter    CBC Auto Differential    Comprehensive Metabolic Panel    Lipid Panel    PSA, Screening    Hemoglobin A1C    Ambulatory referral/consult to General Surgery    albuterol (PROVENTIL/VENTOLIN HFA) 90 mcg/actuation inhaler    predniSONE (DELTASONE) 50 MG Tab       Vaccines recommended: covid-19 booster    Follow-up in 2 weeks for asthma and 6 months with labs or sooner if any concerns.    This note is dictated using the M*Modal Fluency Direct word recognition program. There are word recognition mistakes that are occasionally missed on review.    Dr. Anni Beth D.O.   Family Medicine

## 2022-01-20 ENCOUNTER — OFFICE VISIT (OUTPATIENT)
Dept: FAMILY MEDICINE | Facility: CLINIC | Age: 67
End: 2022-01-20
Payer: MEDICARE

## 2022-01-20 VITALS
BODY MASS INDEX: 38.18 KG/M2 | WEIGHT: 257.81 LBS | HEIGHT: 69 IN | HEART RATE: 71 BPM | SYSTOLIC BLOOD PRESSURE: 120 MMHG | OXYGEN SATURATION: 98 % | DIASTOLIC BLOOD PRESSURE: 68 MMHG

## 2022-01-20 DIAGNOSIS — H91.93 BILATERAL HEARING LOSS, UNSPECIFIED HEARING LOSS TYPE: ICD-10-CM

## 2022-01-20 DIAGNOSIS — I36.1 NON-RHEUMATIC TRICUSPID VALVE INSUFFICIENCY: ICD-10-CM

## 2022-01-20 DIAGNOSIS — K43.2 INCISIONAL HERNIA, WITHOUT OBSTRUCTION OR GANGRENE: ICD-10-CM

## 2022-01-20 DIAGNOSIS — E78.00 PURE HYPERCHOLESTEROLEMIA: ICD-10-CM

## 2022-01-20 DIAGNOSIS — E66.01 SEVERE OBESITY WITH BODY MASS INDEX (BMI) OF 35.0 TO 39.9 WITH SERIOUS COMORBIDITY: ICD-10-CM

## 2022-01-20 DIAGNOSIS — I25.10 CORONARY ARTERY DISEASE INVOLVING NATIVE CORONARY ARTERY OF NATIVE HEART WITHOUT ANGINA PECTORIS: ICD-10-CM

## 2022-01-20 DIAGNOSIS — I10 ESSENTIAL HYPERTENSION: Primary | ICD-10-CM

## 2022-01-20 DIAGNOSIS — Z12.5 SCREENING FOR PROSTATE CANCER: ICD-10-CM

## 2022-01-20 DIAGNOSIS — J45.21 MILD INTERMITTENT ASTHMA WITH ACUTE EXACERBATION: ICD-10-CM

## 2022-01-20 DIAGNOSIS — E80.1 PCT (PORPHYRIA CUTANEA TARDA): ICD-10-CM

## 2022-01-20 DIAGNOSIS — R73.03 PREDIABETES: ICD-10-CM

## 2022-01-20 DIAGNOSIS — R79.89 ELEVATED LFTS: ICD-10-CM

## 2022-01-20 PROCEDURE — 3074F SYST BP LT 130 MM HG: CPT | Mod: HCNC,CPTII,S$GLB, | Performed by: FAMILY MEDICINE

## 2022-01-20 PROCEDURE — 99499 UNLISTED E&M SERVICE: CPT | Mod: S$GLB,,, | Performed by: FAMILY MEDICINE

## 2022-01-20 PROCEDURE — 3288F PR FALLS RISK ASSESSMENT DOCUMENTED: ICD-10-PCS | Mod: HCNC,CPTII,S$GLB, | Performed by: FAMILY MEDICINE

## 2022-01-20 PROCEDURE — 1126F PR PAIN SEVERITY QUANTIFIED, NO PAIN PRESENT: ICD-10-PCS | Mod: HCNC,CPTII,S$GLB, | Performed by: FAMILY MEDICINE

## 2022-01-20 PROCEDURE — 99215 OFFICE O/P EST HI 40 MIN: CPT | Mod: HCNC,S$GLB,, | Performed by: FAMILY MEDICINE

## 2022-01-20 PROCEDURE — 3044F PR MOST RECENT HEMOGLOBIN A1C LEVEL <7.0%: ICD-10-PCS | Mod: HCNC,CPTII,S$GLB, | Performed by: FAMILY MEDICINE

## 2022-01-20 PROCEDURE — 3078F PR MOST RECENT DIASTOLIC BLOOD PRESSURE < 80 MM HG: ICD-10-PCS | Mod: HCNC,CPTII,S$GLB, | Performed by: FAMILY MEDICINE

## 2022-01-20 PROCEDURE — 99215 PR OFFICE/OUTPT VISIT, EST, LEVL V, 40-54 MIN: ICD-10-PCS | Mod: HCNC,S$GLB,, | Performed by: FAMILY MEDICINE

## 2022-01-20 PROCEDURE — 1101F PR PT FALLS ASSESS DOC 0-1 FALLS W/OUT INJ PAST YR: ICD-10-PCS | Mod: HCNC,CPTII,S$GLB, | Performed by: FAMILY MEDICINE

## 2022-01-20 PROCEDURE — 99999 PR PBB SHADOW E&M-EST. PATIENT-LVL III: CPT | Mod: PBBFAC,HCNC,, | Performed by: FAMILY MEDICINE

## 2022-01-20 PROCEDURE — 3008F BODY MASS INDEX DOCD: CPT | Mod: HCNC,CPTII,S$GLB, | Performed by: FAMILY MEDICINE

## 2022-01-20 PROCEDURE — 3288F FALL RISK ASSESSMENT DOCD: CPT | Mod: HCNC,CPTII,S$GLB, | Performed by: FAMILY MEDICINE

## 2022-01-20 PROCEDURE — 3044F HG A1C LEVEL LT 7.0%: CPT | Mod: HCNC,CPTII,S$GLB, | Performed by: FAMILY MEDICINE

## 2022-01-20 PROCEDURE — 3074F PR MOST RECENT SYSTOLIC BLOOD PRESSURE < 130 MM HG: ICD-10-PCS | Mod: HCNC,CPTII,S$GLB, | Performed by: FAMILY MEDICINE

## 2022-01-20 PROCEDURE — 1159F MED LIST DOCD IN RCRD: CPT | Mod: HCNC,CPTII,S$GLB, | Performed by: FAMILY MEDICINE

## 2022-01-20 PROCEDURE — 1101F PT FALLS ASSESS-DOCD LE1/YR: CPT | Mod: HCNC,CPTII,S$GLB, | Performed by: FAMILY MEDICINE

## 2022-01-20 PROCEDURE — 1126F AMNT PAIN NOTED NONE PRSNT: CPT | Mod: HCNC,CPTII,S$GLB, | Performed by: FAMILY MEDICINE

## 2022-01-20 PROCEDURE — 99499 RISK ADDL DX/OHS AUDIT: ICD-10-PCS | Mod: S$GLB,,, | Performed by: FAMILY MEDICINE

## 2022-01-20 PROCEDURE — 3008F PR BODY MASS INDEX (BMI) DOCUMENTED: ICD-10-PCS | Mod: HCNC,CPTII,S$GLB, | Performed by: FAMILY MEDICINE

## 2022-01-20 PROCEDURE — 3078F DIAST BP <80 MM HG: CPT | Mod: HCNC,CPTII,S$GLB, | Performed by: FAMILY MEDICINE

## 2022-01-20 PROCEDURE — 1159F PR MEDICATION LIST DOCUMENTED IN MEDICAL RECORD: ICD-10-PCS | Mod: HCNC,CPTII,S$GLB, | Performed by: FAMILY MEDICINE

## 2022-01-20 PROCEDURE — 99999 PR PBB SHADOW E&M-EST. PATIENT-LVL III: ICD-10-PCS | Mod: PBBFAC,HCNC,, | Performed by: FAMILY MEDICINE

## 2022-01-20 RX ORDER — ALBUTEROL SULFATE 90 UG/1
2 AEROSOL, METERED RESPIRATORY (INHALATION) EVERY 6 HOURS PRN
Qty: 18 G | Refills: 5 | Status: SHIPPED | OUTPATIENT
Start: 2022-01-20 | End: 2023-07-10 | Stop reason: SDUPTHER

## 2022-01-20 RX ORDER — PREDNISONE 50 MG/1
50 TABLET ORAL DAILY
Qty: 5 TABLET | Refills: 0 | Status: SHIPPED | OUTPATIENT
Start: 2022-01-20 | End: 2022-01-25

## 2022-01-24 ENCOUNTER — TELEPHONE (OUTPATIENT)
Dept: OPHTHALMOLOGY | Facility: CLINIC | Age: 67
End: 2022-01-24
Payer: MEDICARE

## 2022-02-01 NOTE — PATIENT INSTRUCTIONS
Covid-19 vaccination scheduling  1. You can schedule on your MyChart  a. Go to Visits  b. Schedule visit  c. Scroll to the bottom and select Covid-19 vaccine/booster  d. Answer questions  e. Select location   2. You can also call [phone]  3. The Moderna vaccine is available at any [facility] pharmacy for walk in  4. Local pharmacies also have the booster available

## 2022-02-01 NOTE — PROGRESS NOTES
FAMILY MEDICINE  [facility]    Reason for visit:   Chief Complaint   Patient presents with    Follow-up       HPI: Jean Shaw Jr. is a 67 y.o. male  - with obesity, CAD (2014 angiogram proximal LAD 20% and LCA OM1 70% stenosis), hypertension, hyperlipidemia, tricuspid regurgitation, asthma, GERD and porphyria cutanea tarda presents to follow-up asthma      Cardiologist: Dr. Lamont Byers  Ortho: Dr. Riley Varghese MD      Jean Shaw Jr. Saw me 1/20/22 for complaints of increased asthma symptoms. +wheezes were noted on exam and he was started on Prednisone 50 mg daily. Since prednisone he reports that he has been doing well.  Symptoms have resolved.  He denies any recurrent     1. Asthma  - mild intermittent     Age diagnosed: childhood  Prior hospitalizations: denies  History of intubation: denies     Current medications  albuterol (PROVENTIL/VENTOLIN HFA) 90 mcg/actuation inhaler, Inhale 2 puffs into the lungs every 6 (six) hours as needed for Wheezing., Disp: 18 g, Rfl: 5     Current symptoms: denies  Recent exacerbations: 2021 x2, 1/20/22   How often using rescue inhaler?  None since completed steroids.     Last PFT: none     Vaccines:   Influenza:  Up to date  Prevnar 13:  07/09/2020  Pneumovax 23:  07/11/2018  COVID-19:  Recommended booster                   Review of Systems   All other systems reviewed and are negative.      HISTORY:   Past Medical History:   Diagnosis Date    Allergy     Asthma     Cataract 3/14/2019    Scheduled for left cataract extraction on 03/26/2019    Combined forms of age-related cataract of both eyes 3/26/2019    Coronary artery disease     sm blockage per heart    Fever blister     GERD (gastroesophageal reflux disease)     History of left cataract extraction 5/7/2019    Hyperlipidemia     Hypertension     Nasal polyps 1/15/2015    Onychomycosis due to dermatophyte 8/28/2019       Past Surgical History:   Procedure Laterality Date    CARDIAC  CATHETERIZATION  10/23/14    CATARACT EXTRACTION W/  INTRAOCULAR LENS IMPLANT Left 3/26/2019    Procedure: EXTRACTION, CATARACT, WITH IOL INSERTION;  Surgeon: Wilmar Jo MD;  Location: [facility] OR;  Service: Ophthalmology;  Laterality: Left;    CATARACT EXTRACTION W/  INTRAOCULAR LENS IMPLANT Right 11/9/2020    Procedure: EXTRACTION, CATARACT, WITH IOL INSERTION;  Surgeon: Gladys Santiago MD;  Location: [facility] OR;  Service: Ophthalmology;  Laterality: Right;    COLONOSCOPY      HERNIA REPAIR  2011    HERNIA REPAIR      HERNIA REPAIR      SKIN BIOPSY         Family History   Problem Relation Age of Onset    Osteoporosis Mother     Seizures Mother     Dementia Mother     Stroke Mother     Stroke Father     Diabetes Sister     No Known Problems Brother     Stroke Maternal Grandfather     No Known Problems Sister        Social History     Tobacco Use    Smoking status: Never Smoker    Smokeless tobacco: Never Used   Substance Use Topics    Alcohol use: Not Currently     Alcohol/week: 0.0 standard drinks    Drug use: No       Social History     Social History Narrative    . No children. Self-employed and does construction work. Lives with sister Fredi on their family property. Enjoys gardening and has made wine in the past. Has 3 siblings. Sister Antonia Gibson. Close with family       ALLERGIES:   Review of patient's allergies indicates:  No Known Allergies    MEDS:     Current Outpatient Medications:     albuterol (PROVENTIL/VENTOLIN HFA) 90 mcg/actuation inhaler, Inhale 2 puffs into the lungs every 6 (six) hours as needed for Wheezing., Disp: 18 g, Rfl: 5    aspirin 81 MG Chew, Take 1 tablet (81 mg total) by mouth once daily., Disp: 90 tablet, Rfl: 11    atorvastatin (LIPITOR) 80 MG tablet, TAKE 1 TABLET EVERY EVENING, Disp: 90 tablet, Rfl: 3    ibuprofen (ADVIL,MOTRIN) 800 MG tablet, Take 1 tablet (800 mg total) by mouth 3 (three) times daily., Disp: 90 tablet, Rfl: 1     "lisinopriL-hydrochlorothiazide (PRINZIDE,ZESTORETIC) 20-25 mg Tab, TAKE 1 TABLET EVERY DAY, Disp: 90 tablet, Rfl: 3    omeprazole (PRILOSEC) 20 MG capsule, TAKE 1 CAPSULE EVERY DAY, Disp: 90 capsule, Rfl: 3    triamcinolone (NASACORT) 55 mcg nasal inhaler, 2 sprays by Nasal route 2 (two) times daily., Disp: , Rfl:     diclofenac sodium (VOLTAREN) 1 % Gel, Apply 2 g topically 4 (four) times daily as needed (knee pain)., Disp: 350 g, Rfl: 11    Current Facility-Administered Medications:     sodium chloride 0.9% flush 10 mL, 10 mL, Intravenous, PRN, Gladys Santiago MD    Vital signs:   Vitals:    02/02/22 1125   BP: 105/60   BP Location: Left arm   Patient Position: Sitting   BP Method: X-Large (Manual)   Pulse: (!) 58   Resp: 18   SpO2: 98%   Weight: 116.8 kg (257 lb 6.4 oz)   Height: 5' 9" (1.753 m)     Body mass index is 38.01 kg/m².    PHYSICAL EXAM:     Physical Exam  Constitutional:       General: He is not in acute distress.  Cardiovascular:      Rate and Rhythm: Normal rate and regular rhythm.      Heart sounds: Normal heart sounds. No murmur heard.  No friction rub. No gallop.    Pulmonary:      Effort: Pulmonary effort is normal.      Breath sounds: Normal breath sounds. No wheezing, rhonchi or rales.   Musculoskeletal:      Cervical back: Neck supple.      Right lower leg: No edema.      Left lower leg: No edema.   Skin:     General: Skin is warm.   Neurological:      Mental Status: He is alert.           PHQ4 = Score: 0    PERTINENT RESULTS:   No visits with results within 1 Week(s) from this visit.   Latest known visit with results is:   Lab Visit on 01/14/2022   Component Date Value Ref Range Status    Hemoglobin A1C 01/14/2022 6.0* 4.0 - 5.6 % Final    Comment: ADA Screening Guidelines:  5.7-6.4%  Consistent with prediabetes  >or=6.5%  Consistent with diabetes    High levels of fetal hemoglobin interfere with the HbA1C  assay. Heterozygous hemoglobin variants (HbS, HgC, etc)do  not significantly " interfere with this assay.   However, presence of multiple variants may affect accuracy.      Estimated Avg Glucose 01/14/2022 126  68 - 131 mg/dL Final    Sodium 01/14/2022 139  136 - 145 mmol/L Final    Potassium 01/14/2022 4.3  3.5 - 5.1 mmol/L Final    Chloride 01/14/2022 99  95 - 110 mmol/L Final    CO2 01/14/2022 29  23 - 29 mmol/L Final    Glucose 01/14/2022 121* 70 - 110 mg/dL Final    BUN 01/14/2022 15  2 - 20 mg/dL Final    Creatinine 01/14/2022 0.77  0.50 - 1.40 mg/dL Final    Calcium 01/14/2022 9.8  8.7 - 10.5 mg/dL Final    Total Protein 01/14/2022 7.8  6.0 - 8.4 g/dL Final    Albumin 01/14/2022 4.7  3.5 - 5.2 g/dL Final    Total Bilirubin 01/14/2022 0.6  0.1 - 1.0 mg/dL Final    Comment: For infants and newborns, interpretation of results should be based  on gestational age, weight and in agreement with clinical  observations.    Premature Infant recommended reference ranges:  Up to 24 hours.............<8.0 mg/dL  Up to 48 hours............<12.0 mg/dL  3-5 days..................<15.0 mg/dL  6-29 days.................<15.0 mg/dL      Alkaline Phosphatase 01/14/2022 101  38 - 126 U/L Final    AST 01/14/2022 37  15 - 46 U/L Final    ALT 01/14/2022 39  10 - 44 U/L Final    Anion Gap 01/14/2022 11  8 - 16 mmol/L Final    eGFR if African American 01/14/2022 >60.0  >60 mL/min/1.73 m^2 Final    eGFR if non African American 01/14/2022 >60.0  >60 mL/min/1.73 m^2 Final    Comment: Calculation used to obtain the estimated glomerular filtration  rate (eGFR) is the CKD-EPI equation.          ASSESSMENT/PLAN:  Problem List Items Addressed This Visit        Pulmonary    Mild intermittent asthma without complication    Overview     - 01/20/2022 exacerbation resolved  - continue with albuterol as needed and discussed if he continues to have flares of his asthma to notify me.  He may need to be on a maintenance medication.  - questions elicited and answered  - encouraged to patient to notify me of any  questions or concerns            Cardiac/Vascular    Coronary artery disease involving native coronary artery of native heart without angina pectoris    Overview     - CCS: 0  - 10/23/14 Angiogram LVEDP=7mmHg, 70% OM1 stenosis,  RCA has a 30% stenosis, LAD 20% stenosis   - goal LDL <70 on high dose statin  - BP goal < 130/80  - ASA 81 mg daily  - not on Beta-blocker due to bradycardia         Essential hypertension    Overview     - well controlled  - continue current medications         Pure hypercholesterolemia    Overview     Lab Results   Component Value Date    LDLCALC 69.0 07/16/2021     - well controlled  - continue current medication            Endocrine    Prediabetes - Primary    Overview     Lab Results   Component Value Date    HGBA1C 6.0 (H) 01/14/2022     - discussed recommendation for diet and cardiovascular exercise  - counseling on lifestyle modifications for risk factor reduction            Orthopedic    Chronic pain of right knee    Overview     - 06/30/2021 right knee x-ray: There is no fracture, dislocation, or bony erosion.  The joint spaces appear satisfactorily preserved.  - he reports pain is well control with diclofenac topical and he needs a refill         Relevant Medications    diclofenac sodium (VOLTAREN) 1 % Gel          ORDERS:   Orders Placed This Encounter    diclofenac sodium (VOLTAREN) 1 % Gel       Vaccines recommended: covid-19 booster    Follow-up in 2 weeks for asthma and 6 months with labs or sooner if any concerns.    This note is dictated using the M*Modal Fluency Direct word recognition program. There are word recognition mistakes that are occasionally missed on review.    Dr. Anni Beth D.O.   Family Medicine

## 2022-02-02 ENCOUNTER — OFFICE VISIT (OUTPATIENT)
Dept: FAMILY MEDICINE | Facility: CLINIC | Age: 67
End: 2022-02-02
Payer: MEDICARE

## 2022-02-02 VITALS
HEART RATE: 58 BPM | SYSTOLIC BLOOD PRESSURE: 105 MMHG | DIASTOLIC BLOOD PRESSURE: 60 MMHG | HEIGHT: 69 IN | WEIGHT: 257.38 LBS | RESPIRATION RATE: 18 BRPM | BODY MASS INDEX: 38.12 KG/M2 | OXYGEN SATURATION: 98 %

## 2022-02-02 DIAGNOSIS — M25.561 CHRONIC PAIN OF RIGHT KNEE: ICD-10-CM

## 2022-02-02 DIAGNOSIS — I10 ESSENTIAL HYPERTENSION: ICD-10-CM

## 2022-02-02 DIAGNOSIS — R73.03 PREDIABETES: Primary | ICD-10-CM

## 2022-02-02 DIAGNOSIS — G89.29 CHRONIC PAIN OF RIGHT KNEE: ICD-10-CM

## 2022-02-02 DIAGNOSIS — J45.20 MILD INTERMITTENT ASTHMA WITHOUT COMPLICATION: ICD-10-CM

## 2022-02-02 DIAGNOSIS — I25.10 CORONARY ARTERY DISEASE INVOLVING NATIVE CORONARY ARTERY OF NATIVE HEART WITHOUT ANGINA PECTORIS: ICD-10-CM

## 2022-02-02 DIAGNOSIS — E78.00 PURE HYPERCHOLESTEROLEMIA: ICD-10-CM

## 2022-02-02 PROCEDURE — 3044F PR MOST RECENT HEMOGLOBIN A1C LEVEL <7.0%: ICD-10-PCS | Mod: HCNC,CPTII,S$GLB, | Performed by: FAMILY MEDICINE

## 2022-02-02 PROCEDURE — 3078F DIAST BP <80 MM HG: CPT | Mod: HCNC,CPTII,S$GLB, | Performed by: FAMILY MEDICINE

## 2022-02-02 PROCEDURE — 3074F SYST BP LT 130 MM HG: CPT | Mod: HCNC,CPTII,S$GLB, | Performed by: FAMILY MEDICINE

## 2022-02-02 PROCEDURE — 1126F AMNT PAIN NOTED NONE PRSNT: CPT | Mod: HCNC,CPTII,S$GLB, | Performed by: FAMILY MEDICINE

## 2022-02-02 PROCEDURE — 3074F PR MOST RECENT SYSTOLIC BLOOD PRESSURE < 130 MM HG: ICD-10-PCS | Mod: HCNC,CPTII,S$GLB, | Performed by: FAMILY MEDICINE

## 2022-02-02 PROCEDURE — 99499 RISK ADDL DX/OHS AUDIT: ICD-10-PCS | Mod: S$GLB,,, | Performed by: FAMILY MEDICINE

## 2022-02-02 PROCEDURE — 1159F PR MEDICATION LIST DOCUMENTED IN MEDICAL RECORD: ICD-10-PCS | Mod: HCNC,CPTII,S$GLB, | Performed by: FAMILY MEDICINE

## 2022-02-02 PROCEDURE — 3078F PR MOST RECENT DIASTOLIC BLOOD PRESSURE < 80 MM HG: ICD-10-PCS | Mod: HCNC,CPTII,S$GLB, | Performed by: FAMILY MEDICINE

## 2022-02-02 PROCEDURE — 1126F PR PAIN SEVERITY QUANTIFIED, NO PAIN PRESENT: ICD-10-PCS | Mod: HCNC,CPTII,S$GLB, | Performed by: FAMILY MEDICINE

## 2022-02-02 PROCEDURE — 3008F PR BODY MASS INDEX (BMI) DOCUMENTED: ICD-10-PCS | Mod: HCNC,CPTII,S$GLB, | Performed by: FAMILY MEDICINE

## 2022-02-02 PROCEDURE — 99999 PR PBB SHADOW E&M-EST. PATIENT-LVL IV: ICD-10-PCS | Mod: PBBFAC,HCNC,, | Performed by: FAMILY MEDICINE

## 2022-02-02 PROCEDURE — 1101F PR PT FALLS ASSESS DOC 0-1 FALLS W/OUT INJ PAST YR: ICD-10-PCS | Mod: HCNC,CPTII,S$GLB, | Performed by: FAMILY MEDICINE

## 2022-02-02 PROCEDURE — 99999 PR PBB SHADOW E&M-EST. PATIENT-LVL IV: CPT | Mod: PBBFAC,HCNC,, | Performed by: FAMILY MEDICINE

## 2022-02-02 PROCEDURE — 1101F PT FALLS ASSESS-DOCD LE1/YR: CPT | Mod: HCNC,CPTII,S$GLB, | Performed by: FAMILY MEDICINE

## 2022-02-02 PROCEDURE — 99214 PR OFFICE/OUTPT VISIT, EST, LEVL IV, 30-39 MIN: ICD-10-PCS | Mod: HCNC,S$GLB,, | Performed by: FAMILY MEDICINE

## 2022-02-02 PROCEDURE — 1159F MED LIST DOCD IN RCRD: CPT | Mod: HCNC,CPTII,S$GLB, | Performed by: FAMILY MEDICINE

## 2022-02-02 PROCEDURE — 99214 OFFICE O/P EST MOD 30 MIN: CPT | Mod: HCNC,S$GLB,, | Performed by: FAMILY MEDICINE

## 2022-02-02 PROCEDURE — 3008F BODY MASS INDEX DOCD: CPT | Mod: HCNC,CPTII,S$GLB, | Performed by: FAMILY MEDICINE

## 2022-02-02 PROCEDURE — 99499 UNLISTED E&M SERVICE: CPT | Mod: S$GLB,,, | Performed by: FAMILY MEDICINE

## 2022-02-02 PROCEDURE — 3044F HG A1C LEVEL LT 7.0%: CPT | Mod: HCNC,CPTII,S$GLB, | Performed by: FAMILY MEDICINE

## 2022-02-02 PROCEDURE — 3288F FALL RISK ASSESSMENT DOCD: CPT | Mod: HCNC,CPTII,S$GLB, | Performed by: FAMILY MEDICINE

## 2022-02-02 PROCEDURE — 3288F PR FALLS RISK ASSESSMENT DOCUMENTED: ICD-10-PCS | Mod: HCNC,CPTII,S$GLB, | Performed by: FAMILY MEDICINE

## 2022-02-02 RX ORDER — DICLOFENAC SODIUM 10 MG/G
2 GEL TOPICAL 4 TIMES DAILY PRN
Qty: 350 G | Refills: 11 | Status: SHIPPED | OUTPATIENT
Start: 2022-02-02

## 2022-02-03 ENCOUNTER — OFFICE VISIT (OUTPATIENT)
Dept: SURGERY | Facility: CLINIC | Age: 67
End: 2022-02-03
Payer: MEDICARE

## 2022-02-03 VITALS
DIASTOLIC BLOOD PRESSURE: 85 MMHG | WEIGHT: 258.5 LBS | HEART RATE: 74 BPM | SYSTOLIC BLOOD PRESSURE: 139 MMHG | BODY MASS INDEX: 38.29 KG/M2 | HEIGHT: 69 IN

## 2022-02-03 DIAGNOSIS — K43.2 INCISIONAL HERNIA, WITHOUT OBSTRUCTION OR GANGRENE: Primary | ICD-10-CM

## 2022-02-03 PROCEDURE — 1159F PR MEDICATION LIST DOCUMENTED IN MEDICAL RECORD: ICD-10-PCS | Mod: HCNC,CPTII,S$GLB, | Performed by: SURGERY

## 2022-02-03 PROCEDURE — 3044F PR MOST RECENT HEMOGLOBIN A1C LEVEL <7.0%: ICD-10-PCS | Mod: HCNC,CPTII,S$GLB, | Performed by: SURGERY

## 2022-02-03 PROCEDURE — 1126F PR PAIN SEVERITY QUANTIFIED, NO PAIN PRESENT: ICD-10-PCS | Mod: HCNC,CPTII,S$GLB, | Performed by: SURGERY

## 2022-02-03 PROCEDURE — 3075F PR MOST RECENT SYSTOLIC BLOOD PRESS GE 130-139MM HG: ICD-10-PCS | Mod: HCNC,CPTII,S$GLB, | Performed by: SURGERY

## 2022-02-03 PROCEDURE — 1101F PR PT FALLS ASSESS DOC 0-1 FALLS W/OUT INJ PAST YR: ICD-10-PCS | Mod: HCNC,CPTII,S$GLB, | Performed by: SURGERY

## 2022-02-03 PROCEDURE — 1101F PT FALLS ASSESS-DOCD LE1/YR: CPT | Mod: HCNC,CPTII,S$GLB, | Performed by: SURGERY

## 2022-02-03 PROCEDURE — 3079F DIAST BP 80-89 MM HG: CPT | Mod: HCNC,CPTII,S$GLB, | Performed by: SURGERY

## 2022-02-03 PROCEDURE — 1126F AMNT PAIN NOTED NONE PRSNT: CPT | Mod: HCNC,CPTII,S$GLB, | Performed by: SURGERY

## 2022-02-03 PROCEDURE — 3008F PR BODY MASS INDEX (BMI) DOCUMENTED: ICD-10-PCS | Mod: HCNC,CPTII,S$GLB, | Performed by: SURGERY

## 2022-02-03 PROCEDURE — 99999 PR PBB SHADOW E&M-EST. PATIENT-LVL IV: CPT | Mod: PBBFAC,HCNC,, | Performed by: SURGERY

## 2022-02-03 PROCEDURE — 3288F FALL RISK ASSESSMENT DOCD: CPT | Mod: HCNC,CPTII,S$GLB, | Performed by: SURGERY

## 2022-02-03 PROCEDURE — 99999 PR PBB SHADOW E&M-EST. PATIENT-LVL IV: ICD-10-PCS | Mod: PBBFAC,HCNC,, | Performed by: SURGERY

## 2022-02-03 PROCEDURE — 3079F PR MOST RECENT DIASTOLIC BLOOD PRESSURE 80-89 MM HG: ICD-10-PCS | Mod: HCNC,CPTII,S$GLB, | Performed by: SURGERY

## 2022-02-03 PROCEDURE — 1160F RVW MEDS BY RX/DR IN RCRD: CPT | Mod: HCNC,CPTII,S$GLB, | Performed by: SURGERY

## 2022-02-03 PROCEDURE — 3288F PR FALLS RISK ASSESSMENT DOCUMENTED: ICD-10-PCS | Mod: HCNC,CPTII,S$GLB, | Performed by: SURGERY

## 2022-02-03 PROCEDURE — 1160F PR REVIEW ALL MEDS BY PRESCRIBER/CLIN PHARMACIST DOCUMENTED: ICD-10-PCS | Mod: HCNC,CPTII,S$GLB, | Performed by: SURGERY

## 2022-02-03 PROCEDURE — 3008F BODY MASS INDEX DOCD: CPT | Mod: HCNC,CPTII,S$GLB, | Performed by: SURGERY

## 2022-02-03 PROCEDURE — 99202 OFFICE O/P NEW SF 15 MIN: CPT | Mod: HCNC,S$GLB,, | Performed by: SURGERY

## 2022-02-03 PROCEDURE — 99202 PR OFFICE/OUTPT VISIT, NEW, LEVL II, 15-29 MIN: ICD-10-PCS | Mod: HCNC,S$GLB,, | Performed by: SURGERY

## 2022-02-03 PROCEDURE — 3075F SYST BP GE 130 - 139MM HG: CPT | Mod: HCNC,CPTII,S$GLB, | Performed by: SURGERY

## 2022-02-03 PROCEDURE — 3044F HG A1C LEVEL LT 7.0%: CPT | Mod: HCNC,CPTII,S$GLB, | Performed by: SURGERY

## 2022-02-03 PROCEDURE — 1159F MED LIST DOCD IN RCRD: CPT | Mod: HCNC,CPTII,S$GLB, | Performed by: SURGERY

## 2022-02-03 NOTE — H&P (VIEW-ONLY)
[facility]  OUTPATIENT H&P    REASON FOR VISIT/CC: Incisional hernia    HPI: Jean Shaw Jr. is a 67 y.o. male s/p ventral hernia repair with mesh some years ago.  Over the last few months he has noted a recurrent protrusion and intermittent sharp pains.  He denies N/V or changes in bowel function.  He is interested in another hernia repair.  He has a hx of CAD that is treated medically.  He has no SOB or CP.  ECOG= 0.    I have reviewed the patient's chart including prior progress notes, procedures and testing.     ROS:   Review of Systems   All other systems reviewed and are negative.      PROBLEM LIST:  Patient Active Problem List   Diagnosis    Pure hypercholesterolemia    Mild intermittent asthma without complication    GERD (gastroesophageal reflux disease)    Coronary artery disease involving native coronary artery of native heart without angina pectoris    Allergic rhinitis    Non-rheumatic tricuspid valve insufficiency    Severe obesity with body mass index (BMI) of 35.0 to 39.9 with serious comorbidity    Essential hypertension    PCT (porphyria cutanea tarda)    Prediabetes    Bilateral hearing loss    Nuclear sclerosis of right eye    Elevated LFTs    Incisional hernia, without obstruction or gangrene    Chronic pain of right knee         HISTORY  Past Medical History:   Diagnosis Date    Allergy     Asthma     Cataract 3/14/2019    Scheduled for left cataract extraction on 03/26/2019    Combined forms of age-related cataract of both eyes 3/26/2019    Coronary artery disease     sm blockage per heart    Fever blister     GERD (gastroesophageal reflux disease)     History of left cataract extraction 5/7/2019    Hyperlipidemia     Hypertension     Nasal polyps 1/15/2015    Onychomycosis due to dermatophyte 8/28/2019       Past Surgical History:   Procedure Laterality Date    CARDIAC CATHETERIZATION  10/23/14    CATARACT EXTRACTION W/  INTRAOCULAR LENS IMPLANT  Left 3/26/2019    Procedure: EXTRACTION, CATARACT, WITH IOL INSERTION;  Surgeon: Wilmar Jo MD;  Location: [facility] OR;  Service: Ophthalmology;  Laterality: Left;    CATARACT EXTRACTION W/  INTRAOCULAR LENS IMPLANT Right 11/9/2020    Procedure: EXTRACTION, CATARACT, WITH IOL INSERTION;  Surgeon: Gladys Santiago MD;  Location: [facility] OR;  Service: Ophthalmology;  Laterality: Right;    COLONOSCOPY      HERNIA REPAIR  2011    HERNIA REPAIR      HERNIA REPAIR      SKIN BIOPSY         Social History     Tobacco Use    Smoking status: Never Smoker    Smokeless tobacco: Never Used   Substance Use Topics    Alcohol use: Not Currently     Alcohol/week: 0.0 standard drinks    Drug use: No       Family History   Problem Relation Age of Onset    Osteoporosis Mother     Seizures Mother     Dementia Mother     Stroke Mother     Stroke Father     Diabetes Sister     No Known Problems Brother     Stroke Maternal Grandfather     No Known Problems Sister          MEDS:  Current Outpatient Medications on File Prior to Visit   Medication Sig Dispense Refill    albuterol (PROVENTIL/VENTOLIN HFA) 90 mcg/actuation inhaler Inhale 2 puffs into the lungs every 6 (six) hours as needed for Wheezing. 18 g 5    aspirin 81 MG Chew Take 1 tablet (81 mg total) by mouth once daily. 90 tablet 11    atorvastatin (LIPITOR) 80 MG tablet TAKE 1 TABLET EVERY EVENING 90 tablet 3    ibuprofen (ADVIL,MOTRIN) 800 MG tablet Take 1 tablet (800 mg total) by mouth 3 (three) times daily. 90 tablet 1    lisinopriL-hydrochlorothiazide (PRINZIDE,ZESTORETIC) 20-25 mg Tab TAKE 1 TABLET EVERY DAY 90 tablet 3    omeprazole (PRILOSEC) 20 MG capsule TAKE 1 CAPSULE EVERY DAY 90 capsule 3    triamcinolone (NASACORT) 55 mcg nasal inhaler 2 sprays by Nasal route 2 (two) times daily.      diclofenac sodium (VOLTAREN) 1 % Gel Apply 2 g topically 4 (four) times daily as needed (knee pain). (Patient not taking: Reported on 2/3/2022) 350 g 11      Current Facility-Administered Medications on File Prior to Visit   Medication Dose Route Frequency Provider Last Rate Last Admin    sodium chloride 0.9% flush 10 mL  10 mL Intravenous PRN Gladys Santiago MD           ALLERGIES:  Review of patient's allergies indicates:  No Known Allergies      VITALS:  Vitals:    02/03/22 0840   BP: 139/85   Pulse: 74         PHYSICAL EXAM:  Physical Exam  Constitutional:       Appearance: Normal appearance. He is obese.   HENT:      Head: Normocephalic and atraumatic.   Eyes:      Pupils: Pupils are equal, round, and reactive to light.   Pulmonary:      Effort: Pulmonary effort is normal.   Abdominal:      Palpations: Abdomen is soft.      Comments: There seems to be a recurrent VH with some mild TTP, no skin erythema.  Boundaries are not palpable due to his body habitus   Skin:     General: Skin is warm and dry.   Neurological:      Mental Status: He is alert. Mental status is at baseline.   Psychiatric:         Mood and Affect: Mood normal.         Behavior: Behavior normal.           LABS:  Lab Results   Component Value Date    WBC 7.50 07/16/2021    RBC 4.86 07/16/2021    HGB 14.6 07/16/2021    HCT 43.8 07/16/2021     07/16/2021     Lab Results   Component Value Date     (H) 01/14/2022     01/14/2022    K 4.3 01/14/2022    CL 99 01/14/2022    CO2 29 01/14/2022    BUN 15 01/14/2022    CREATININE 0.77 01/14/2022    CALCIUM 9.8 01/14/2022     Lab Results   Component Value Date    ALT 39 01/14/2022    AST 37 01/14/2022    ALKPHOS 101 01/14/2022    BILITOT 0.6 01/14/2022     No results found for: MG, PHOS        ASSESSMENT & PLAN:  Will need CT to evaluate the hernia.  Will call with the results.      Layton Acevedo M.D., F.A.C.S.  [id number] General Surgery  [facility] [place] [facility] [place]

## 2022-02-03 NOTE — PROGRESS NOTES
[facility]  OUTPATIENT H&P    REASON FOR VISIT/CC: Incisional hernia    HPI: Jean Shaw Jr. is a 67 y.o. male s/p ventral hernia repair with mesh some years ago.  Over the last few months he has noted a recurrent protrusion and intermittent sharp pains.  He denies N/V or changes in bowel function.  He is interested in another hernia repair.  He has a hx of CAD that is treated medically.  He has no SOB or CP.  ECOG= 0.    I have reviewed the patient's chart including prior progress notes, procedures and testing.     ROS:   Review of Systems   All other systems reviewed and are negative.      PROBLEM LIST:  Patient Active Problem List   Diagnosis    Pure hypercholesterolemia    Mild intermittent asthma without complication    GERD (gastroesophageal reflux disease)    Coronary artery disease involving native coronary artery of native heart without angina pectoris    Allergic rhinitis    Non-rheumatic tricuspid valve insufficiency    Severe obesity with body mass index (BMI) of 35.0 to 39.9 with serious comorbidity    Essential hypertension    PCT (porphyria cutanea tarda)    Prediabetes    Bilateral hearing loss    Nuclear sclerosis of right eye    Elevated LFTs    Incisional hernia, without obstruction or gangrene    Chronic pain of right knee         HISTORY  Past Medical History:   Diagnosis Date    Allergy     Asthma     Cataract 3/14/2019    Scheduled for left cataract extraction on 03/26/2019    Combined forms of age-related cataract of both eyes 3/26/2019    Coronary artery disease     sm blockage per heart    Fever blister     GERD (gastroesophageal reflux disease)     History of left cataract extraction 5/7/2019    Hyperlipidemia     Hypertension     Nasal polyps 1/15/2015    Onychomycosis due to dermatophyte 8/28/2019       Past Surgical History:   Procedure Laterality Date    CARDIAC CATHETERIZATION  10/23/14    CATARACT EXTRACTION W/  INTRAOCULAR LENS IMPLANT  Left 3/26/2019    Procedure: EXTRACTION, CATARACT, WITH IOL INSERTION;  Surgeon: Wilmar Jo MD;  Location: [facility] OR;  Service: Ophthalmology;  Laterality: Left;    CATARACT EXTRACTION W/  INTRAOCULAR LENS IMPLANT Right 11/9/2020    Procedure: EXTRACTION, CATARACT, WITH IOL INSERTION;  Surgeon: Gladys Santiago MD;  Location: [facility] OR;  Service: Ophthalmology;  Laterality: Right;    COLONOSCOPY      HERNIA REPAIR  2011    HERNIA REPAIR      HERNIA REPAIR      SKIN BIOPSY         Social History     Tobacco Use    Smoking status: Never Smoker    Smokeless tobacco: Never Used   Substance Use Topics    Alcohol use: Not Currently     Alcohol/week: 0.0 standard drinks    Drug use: No       Family History   Problem Relation Age of Onset    Osteoporosis Mother     Seizures Mother     Dementia Mother     Stroke Mother     Stroke Father     Diabetes Sister     No Known Problems Brother     Stroke Maternal Grandfather     No Known Problems Sister          MEDS:  Current Outpatient Medications on File Prior to Visit   Medication Sig Dispense Refill    albuterol (PROVENTIL/VENTOLIN HFA) 90 mcg/actuation inhaler Inhale 2 puffs into the lungs every 6 (six) hours as needed for Wheezing. 18 g 5    aspirin 81 MG Chew Take 1 tablet (81 mg total) by mouth once daily. 90 tablet 11    atorvastatin (LIPITOR) 80 MG tablet TAKE 1 TABLET EVERY EVENING 90 tablet 3    ibuprofen (ADVIL,MOTRIN) 800 MG tablet Take 1 tablet (800 mg total) by mouth 3 (three) times daily. 90 tablet 1    lisinopriL-hydrochlorothiazide (PRINZIDE,ZESTORETIC) 20-25 mg Tab TAKE 1 TABLET EVERY DAY 90 tablet 3    omeprazole (PRILOSEC) 20 MG capsule TAKE 1 CAPSULE EVERY DAY 90 capsule 3    triamcinolone (NASACORT) 55 mcg nasal inhaler 2 sprays by Nasal route 2 (two) times daily.      diclofenac sodium (VOLTAREN) 1 % Gel Apply 2 g topically 4 (four) times daily as needed (knee pain). (Patient not taking: Reported on 2/3/2022) 350 g 11      Current Facility-Administered Medications on File Prior to Visit   Medication Dose Route Frequency Provider Last Rate Last Admin    sodium chloride 0.9% flush 10 mL  10 mL Intravenous PRN Gladys Santiago MD           ALLERGIES:  Review of patient's allergies indicates:  No Known Allergies      VITALS:  Vitals:    02/03/22 0840   BP: 139/85   Pulse: 74         PHYSICAL EXAM:  Physical Exam  Constitutional:       Appearance: Normal appearance. He is obese.   HENT:      Head: Normocephalic and atraumatic.   Eyes:      Pupils: Pupils are equal, round, and reactive to light.   Pulmonary:      Effort: Pulmonary effort is normal.   Abdominal:      Palpations: Abdomen is soft.      Comments: There seems to be a recurrent VH with some mild TTP, no skin erythema.  Boundaries are not palpable due to his body habitus   Skin:     General: Skin is warm and dry.   Neurological:      Mental Status: He is alert. Mental status is at baseline.   Psychiatric:         Mood and Affect: Mood normal.         Behavior: Behavior normal.           LABS:  Lab Results   Component Value Date    WBC 7.50 07/16/2021    RBC 4.86 07/16/2021    HGB 14.6 07/16/2021    HCT 43.8 07/16/2021     07/16/2021     Lab Results   Component Value Date     (H) 01/14/2022     01/14/2022    K 4.3 01/14/2022    CL 99 01/14/2022    CO2 29 01/14/2022    BUN 15 01/14/2022    CREATININE 0.77 01/14/2022    CALCIUM 9.8 01/14/2022     Lab Results   Component Value Date    ALT 39 01/14/2022    AST 37 01/14/2022    ALKPHOS 101 01/14/2022    BILITOT 0.6 01/14/2022     No results found for: MG, PHOS        ASSESSMENT & PLAN:  Will need CT to evaluate the hernia.  Will call with the results.      Layton Acevedo M.D., F.A.C.S.  [id number] General [facility]  [facility] [place] [facility] [place]

## 2022-02-11 ENCOUNTER — TELEPHONE (OUTPATIENT)
Dept: SURGERY | Facility: CLINIC | Age: 67
End: 2022-02-11
Payer: MEDICARE

## 2022-02-11 DIAGNOSIS — K43.0 RECURRENT VENTRAL HERNIA WITH INCARCERATION: Primary | ICD-10-CM

## 2022-02-11 DIAGNOSIS — K43.0 RECURRENT INCISIONAL HERNIA WITH INCARCERATION: ICD-10-CM

## 2022-02-11 RX ORDER — SODIUM CHLORIDE 9 MG/ML
INJECTION, SOLUTION INTRAVENOUS CONTINUOUS
Status: CANCELLED | OUTPATIENT
Start: 2022-02-11

## 2022-02-15 ENCOUNTER — TELEPHONE (OUTPATIENT)
Dept: SURGERY | Facility: CLINIC | Age: 67
End: 2022-02-15
Payer: MEDICARE

## 2022-02-15 DIAGNOSIS — Z01.818 PRE-OP TESTING: Primary | ICD-10-CM

## 2022-02-15 NOTE — TELEPHONE ENCOUNTER
----- Message from Key Matos LPN sent at 2/15/2022 10:01 AM CST -----  Morning, he will need a covid test before surgery on 2/28/22 thanks Vanda             02/15/2022                          [phone]  Attempted to contact patient regarding his COVID test. No answer. Left voicemail.

## 2022-02-21 ENCOUNTER — TELEPHONE (OUTPATIENT)
Dept: CARDIOLOGY | Facility: CLINIC | Age: 67
End: 2022-02-21
Payer: MEDICARE

## 2022-02-21 ENCOUNTER — ANESTHESIA EVENT (OUTPATIENT)
Dept: SURGERY | Facility: HOSPITAL | Age: 67
End: 2022-02-21
Payer: MEDICARE

## 2022-02-21 ENCOUNTER — HOSPITAL ENCOUNTER (OUTPATIENT)
Dept: PREADMISSION TESTING | Facility: HOSPITAL | Age: 67
Discharge: HOME | End: 2022-02-21
Attending: SURGERY
Payer: MEDICARE

## 2022-02-21 ENCOUNTER — CLINICAL SUPPORT (OUTPATIENT)
Dept: LAB | Facility: HOSPITAL | Age: 67
End: 2022-02-21
Attending: SURGERY
Payer: MEDICARE

## 2022-02-21 VITALS
SYSTOLIC BLOOD PRESSURE: 148 MMHG | HEIGHT: 69 IN | WEIGHT: 260.38 LBS | HEART RATE: 50 BPM | RESPIRATION RATE: 16 BRPM | TEMPERATURE: 97 F | DIASTOLIC BLOOD PRESSURE: 74 MMHG | BODY MASS INDEX: 38.57 KG/M2 | OXYGEN SATURATION: 99 %

## 2022-02-21 DIAGNOSIS — Z01.818 PREOP TESTING: Primary | ICD-10-CM

## 2022-02-21 DIAGNOSIS — Z01.818 PREOP TESTING: ICD-10-CM

## 2022-02-21 PROCEDURE — 93010 ELECTROCARDIOGRAM REPORT: CPT | Mod: HCNC,,, | Performed by: INTERNAL MEDICINE

## 2022-02-21 PROCEDURE — 93005 ELECTROCARDIOGRAM TRACING: CPT | Mod: HCNC

## 2022-02-21 PROCEDURE — 93010 EKG 12-LEAD: ICD-10-PCS | Mod: HCNC,,, | Performed by: INTERNAL MEDICINE

## 2022-02-21 RX ORDER — LIDOCAINE HYDROCHLORIDE 10 MG/ML
1 INJECTION, SOLUTION EPIDURAL; INFILTRATION; INTRACAUDAL; PERINEURAL ONCE
Status: CANCELLED | OUTPATIENT
Start: 2022-02-21 | End: 2022-02-21

## 2022-02-21 RX ORDER — SODIUM CHLORIDE, SODIUM LACTATE, POTASSIUM CHLORIDE, CALCIUM CHLORIDE 600; 310; 30; 20 MG/100ML; MG/100ML; MG/100ML; MG/100ML
INJECTION, SOLUTION INTRAVENOUS CONTINUOUS
Status: CANCELLED | OUTPATIENT
Start: 2022-02-21

## 2022-02-21 NOTE — ANESTHESIA PREPROCEDURE EVALUATION
02/21/2022  Jean Shaw Jr. is a 67 y.o., male scheduled for ROBOTIC REPAIR, HERNIA, VENTRAL 2/28/22.    Past Medical History:   Diagnosis Date    Allergy     Asthma     Cataract 3/14/2019    Scheduled for left cataract extraction on 03/26/2019    Combined forms of age-related cataract of both eyes 3/26/2019    Coronary artery disease     sm blockage per heart    Fever blister     GERD (gastroesophageal reflux disease)     History of left cataract extraction 5/7/2019    Hyperlipidemia     Hypertension     Nasal polyps 1/15/2015    Onychomycosis due to dermatophyte 8/28/2019     Past Surgical History:   Procedure Laterality Date    CARDIAC CATHETERIZATION  10/23/14    CATARACT EXTRACTION W/  INTRAOCULAR LENS IMPLANT Left 3/26/2019    Procedure: EXTRACTION, CATARACT, WITH IOL INSERTION;  Surgeon: Wilmar Jo MD;  Location: [facility];  Service: Ophthalmology;  Laterality: Left;    CATARACT EXTRACTION W/  INTRAOCULAR LENS IMPLANT Right 11/9/2020    Procedure: EXTRACTION, CATARACT, WITH IOL INSERTION;  Surgeon: Gladys Santiago MD;  [facility];  Service: Ophthalmology;  Laterality: Right;    COLONOSCOPY      HERNIA REPAIR  2011    HERNIA REPAIR      HERNIA REPAIR      SKIN BIOPSY       Pre-op Assessment    I have reviewed the Patient Summary Reports.     I have reviewed the Nursing Notes.    I have reviewed the Medications.   Steroids Taken In Past Year: Prednisone    Review of Systems  Anesthesia Hx:  No problems with previous Anesthesia Denies Hx of Anesthetic complications  History of prior surgery of interest to airway management or planning: Previous anesthesia: MAC, General  Denies Personal Hx of Anesthesia complications.   Social:  No Alcohol Use, Non-Smoker    Hematology/Oncology:  Hematology Normal   Oncology Normal     Cardiovascular:   Exercise tolerance: good  Hypertension, well controlled CAD    Denies Angina. hyperlipidemia    Pulmonary:   Asthma mild Denies Shortness of breath.    Renal/:  Renal/ Normal     Hepatic/GI:   GERD, well controlled Liver Disease,    Musculoskeletal:   Arthritis     Neurological:  Neurology Normal Denies TIA.  Denies CVA. Denies Seizures.    Endocrine:  Endocrine Normal    Psych:  Psychiatric Normal           Physical Exam  General: Well nourished and Cooperative    Airway:  Mallampati: III / II/ I  Mouth Opening: Normal  TM Distance: Normal  Tongue: Normal  Neck ROM: Normal ROM    Dental:  Intact    Chest/Lungs:  Clear to auscultation, Normal Respiratory Rate    Heart:  Rate: Normal  Rhythm: Regular Rhythm  Sounds: Normal    EKG 2/21/22  Marked sinus bradycardia   Left axis deviation   Abnormal ECG   When compared with ECG of 08-JAN-2020 09:58,   No significant change was found   Confirmed by Osvaldo JULIO [facility] ([id number]) on 2/21/2022 2:13:07 PM       Anesthesia Plan  Type of Anesthesia, risks & benefits discussed:    Anesthesia Type: Gen ETT  Intra-op Monitoring Plan: Standard ASA Monitors  Post Op Pain Control Plan: multimodal analgesia  Informed Consent: Informed consent signed with the Patient and all parties understand the risks and agree with anesthesia plan.  All questions answered.   ASA Score: 3    Ready For Surgery From Anesthesia Perspective.     .

## 2022-02-21 NOTE — TELEPHONE ENCOUNTER
----- Message from Key Matos LPN sent at 2/21/2022  8:17 AM CST -----  Morning, he is having a hernia repair surgery on 2/28/22 and takes ASA 81 mg daily. When can he stop taking the ASA. Please advise thanks Vanda

## 2022-02-21 NOTE — TELEPHONE ENCOUNTER
Spoke to the patient instructed on Dr Byers's message to stay on ASA through out the procedure. SATHISH

## 2022-02-21 NOTE — DISCHARGE INSTRUCTIONS
Your surgery is scheduled for 2/28/22.    Please report to [facility] on the 1st Floor at 0730 a.m.    THIS TIME IS SUBJECT TO CHANGE.  YOU WILL RECEIVE A PHONE CALL THE DAY BEFORE SURGERY BY 3:30 PM TO CONFIRM YOUR TIME OF ARRIVAL.  IF YOU HAVE NOT RECEIVED A PHONE CALL BY 3:30 PM THE DAY BEFORE YOUR SURGERY PLEASE CALL [phone]     INSTRUCTIONS IMPORTANT!!!  ¨ Do not eat or drink after 12 midnight-including water, candy, gum, & mints. OK to brush teeth.      ____  Proceed to [facility] on *** for additional testing.        ____  Do not wear makeup, including mascara.  ____  No powder, lotions or creams to surgical area.  ____  Please remove all jewelry, including piercings and leave at home.  ____  No money or valuables needed. Please leave at home.  ____  Please bring any documents given by your doctor.  ____  If going home the same day, arrange for a ride home. You will not be able to             drive if Anesthesia was used.  ____  Children under 18 years require a parent / guardian present the entire time             they are in surgery / recovery.  ____  Wear loose fitting clothing. Allow for dressings, bandages.  ____  Stop Aspirin, Ibuprofen, Motrin, Aleve, Goody's/BC powders, Excedrine and Naproxen (NSAIDS) at least 3-5 days before surgery, unless otherwise instructed by your doctor, or the nurse.   You MAY use Tylenol/acetaminophen until day of surgery.  ____  Wash the surgical area with Hibiclens the night before surgery, and again the             morning of surgery.  Be sure to rinse hibiclens off completely (if instructed by   nurse).  ____  If you take diabetic medication, do not take am of surgery unless instructed by Doctor.  ____  Call MD for temperature above 101 degrees or any other signs of infection such as Urinary (bladder) infection, Upper respiratory infection, skin boils, etc.  ____ Stop taking any Fish Oil supplement or any Vitamins that contain Vitamin E at  least 5 days prior to surgery.  ____ Do Not wear your contact lenses the day of your procedure.  You may wear your glasses.      ____Do not shave surgical site for 3 days prior to surgery.  ____ Practice Good hand washing before, during, and after procedure.      I have read or had read and explained to me, and understand the above information.  Additional comments or instructions:  For additional questions call [phone]      ANESTHESIA SIDE EFFECTS  -For the first 24 hours after surgery:  Do not drive, use heavy equipment, make important decisions, or drink alcohol  -It is normal to feel sleepy for several hours.  Rest until you are more awake.  -Have someone stay with you, if needed.  They can watch for problems and help keep you safe.  -Some possible post anesthesia side effects include: nausea and vomiting, sore throat and hoarseness, sleepiness, and dizziness.        Pre-Op Bathing Instructions    Before surgery, you can play an important role in your own health.    Because skin is not sterile, we need to be sure that your skin is as free of germs as possible. By following the instructions below, you can reduce the number of germs on your skin before surgery.    IMPORTANT: You will need to shower with a special soap called Hibiclens*, available at any pharmacy.  If you are allergic to Chlorhexidine (the antiseptic in Hibiclens), use an antibacterial soap such as Dial Soap for your preoperative shower.  You will shower with Hibiclens both the night before your surgery and the morning of your surgery.  Do not use Hibiclens on the head, face or genitals to avoid injury to those areas.    STEP #1: THE NIGHT BEFORE YOUR SURGERY     Do not shave the area of your body where your surgery will be performed.  Shower and wash your hair and body as usual with your normal soap and shampoo.  Rinse your hair and body thoroughly after you shower to remove all soap residue.  With your hand, apply one packet of Hibiclens soap to  the surgical site.   Wash the site gently for five (5) minutes. Do not scrub your skin too hard.   Do not wash with your regular soap after Hibiclens is used.  Rinse your body thoroughly.  Pat yourself dry with a clean, soft towel.  Do not use lotion, cream, or powder.  Wear clean clothes.    STEP #2: THE MORNING OF YOUR SURGERY     Repeat Step #1.    * Not to be used by people allergic to Chlorhexidine.

## 2022-02-25 ENCOUNTER — LAB VISIT (OUTPATIENT)
Dept: FAMILY MEDICINE | Facility: CLINIC | Age: 67
End: 2022-02-25
Payer: MEDICARE

## 2022-02-25 DIAGNOSIS — Z01.818 PRE-OP TESTING: ICD-10-CM

## 2022-02-25 PROCEDURE — U0003 INFECTIOUS AGENT DETECTION BY NUCLEIC ACID (DNA OR RNA); SEVERE ACUTE RESPIRATORY SYNDROME CORONAVIRUS 2 (SARS-COV-2) (CORONAVIRUS DISEASE [COVID-19]), AMPLIFIED PROBE TECHNIQUE, MAKING USE OF HIGH THROUGHPUT TECHNOLOGIES AS DESCRIBED BY CMS-2020-01-R: HCPCS | Mod: HCNC | Performed by: SURGERY

## 2022-02-25 PROCEDURE — U0005 INFEC AGEN DETEC AMPLI PROBE: HCPCS | Performed by: SURGERY

## 2022-02-26 LAB
SARS-COV-2 RNA RESP QL NAA+PROBE: NOT DETECTED
SARS-COV-2- CYCLE NUMBER: NORMAL

## 2022-02-28 ENCOUNTER — HOSPITAL ENCOUNTER (OUTPATIENT)
Facility: HOSPITAL | Age: 67
Discharge: HOME | End: 2022-02-28
Attending: SURGERY | Admitting: SURGERY
Payer: MEDICARE

## 2022-02-28 ENCOUNTER — ANESTHESIA (OUTPATIENT)
Dept: SURGERY | Facility: HOSPITAL | Age: 67
End: 2022-02-28
Payer: MEDICARE

## 2022-02-28 VITALS
WEIGHT: 257 LBS | BODY MASS INDEX: 38.06 KG/M2 | RESPIRATION RATE: 16 BRPM | TEMPERATURE: 98 F | DIASTOLIC BLOOD PRESSURE: 81 MMHG | SYSTOLIC BLOOD PRESSURE: 165 MMHG | HEIGHT: 69 IN | OXYGEN SATURATION: 65 % | HEART RATE: 70 BPM

## 2022-02-28 DIAGNOSIS — K43.0 RECURRENT VENTRAL HERNIA WITH INCARCERATION: ICD-10-CM

## 2022-02-28 DIAGNOSIS — K43.0 RECURRENT INCISIONAL HERNIA WITH INCARCERATION: Primary | ICD-10-CM

## 2022-02-28 PROCEDURE — 49653 PR LAP VENTRAL/UMBILICAL HERNIA; INCARCERATED OR STRANGULATED: ICD-10-PCS | Mod: ,,, | Performed by: SURGERY

## 2022-02-28 PROCEDURE — 49653 PR LAP VENTRAL/UMBILICAL HERNIA; INCARCERATED OR STRANGULATED: CPT | Mod: ,,, | Performed by: SURGERY

## 2022-02-28 PROCEDURE — 25000003 PHARM REV CODE 250: Mod: HCNC | Performed by: NURSE ANESTHETIST, CERTIFIED REGISTERED

## 2022-02-28 PROCEDURE — 36000710: Mod: HCNC | Performed by: SURGERY

## 2022-02-28 PROCEDURE — 71000015 HC POSTOP RECOV 1ST HR: Mod: HCNC | Performed by: SURGERY

## 2022-02-28 PROCEDURE — 36000711: Mod: HCNC | Performed by: SURGERY

## 2022-02-28 PROCEDURE — 63600175 PHARM REV CODE 636 W HCPCS: Mod: HCNC | Performed by: SURGERY

## 2022-02-28 PROCEDURE — 37000008 HC ANESTHESIA 1ST 15 MINUTES: Mod: HCNC | Performed by: SURGERY

## 2022-02-28 PROCEDURE — 63600175 PHARM REV CODE 636 W HCPCS: Mod: HCNC | Performed by: NURSE ANESTHETIST, CERTIFIED REGISTERED

## 2022-02-28 PROCEDURE — 63600175 PHARM REV CODE 636 W HCPCS: Mod: HCNC | Performed by: ANESTHESIOLOGY

## 2022-02-28 PROCEDURE — 63600175 PHARM REV CODE 636 W HCPCS: Mod: HCNC | Performed by: NURSE PRACTITIONER

## 2022-02-28 PROCEDURE — 71000016 HC POSTOP RECOV ADDL HR: Mod: HCNC | Performed by: SURGERY

## 2022-02-28 PROCEDURE — C1781 MESH (IMPLANTABLE): HCPCS | Mod: HCNC | Performed by: SURGERY

## 2022-02-28 PROCEDURE — 71000033 HC RECOVERY, INTIAL HOUR: Mod: HCNC | Performed by: SURGERY

## 2022-02-28 PROCEDURE — 37000009 HC ANESTHESIA EA ADD 15 MINS: Mod: HCNC | Performed by: SURGERY

## 2022-02-28 DEVICE — MESH VENTRALIGHT ST 6X8IN: Type: IMPLANTABLE DEVICE | Site: ABDOMEN | Status: FUNCTIONAL

## 2022-02-28 RX ORDER — ACETAMINOPHEN 10 MG/ML
INJECTION, SOLUTION INTRAVENOUS
Status: DISCONTINUED | OUTPATIENT
Start: 2022-02-28 | End: 2022-02-28

## 2022-02-28 RX ORDER — ROCURONIUM BROMIDE 10 MG/ML
INJECTION, SOLUTION INTRAVENOUS
Status: DISCONTINUED | OUTPATIENT
Start: 2022-02-28 | End: 2022-02-28

## 2022-02-28 RX ORDER — PROPOFOL 10 MG/ML
VIAL (ML) INTRAVENOUS
Status: DISCONTINUED | OUTPATIENT
Start: 2022-02-28 | End: 2022-02-28

## 2022-02-28 RX ORDER — OXYCODONE AND ACETAMINOPHEN 5; 325 MG/1; MG/1
1 TABLET ORAL
Status: DISCONTINUED | OUTPATIENT
Start: 2022-02-28 | End: 2022-02-28

## 2022-02-28 RX ORDER — FENTANYL CITRATE 50 UG/ML
INJECTION, SOLUTION INTRAMUSCULAR; INTRAVENOUS
Status: DISCONTINUED | OUTPATIENT
Start: 2022-02-28 | End: 2022-02-28

## 2022-02-28 RX ORDER — LIDOCAINE HYDROCHLORIDE 20 MG/ML
INJECTION, SOLUTION EPIDURAL; INFILTRATION; INTRACAUDAL; PERINEURAL
Status: DISCONTINUED | OUTPATIENT
Start: 2022-02-28 | End: 2022-02-28

## 2022-02-28 RX ORDER — OXYCODONE AND ACETAMINOPHEN 5; 325 MG/1; MG/1
1 TABLET ORAL EVERY 4 HOURS PRN
Status: DISCONTINUED | OUTPATIENT
Start: 2022-02-28 | End: 2022-02-28 | Stop reason: HOSPADM

## 2022-02-28 RX ORDER — SODIUM CHLORIDE, SODIUM LACTATE, POTASSIUM CHLORIDE, CALCIUM CHLORIDE 600; 310; 30; 20 MG/100ML; MG/100ML; MG/100ML; MG/100ML
INJECTION, SOLUTION INTRAVENOUS CONTINUOUS
Status: DISCONTINUED | OUTPATIENT
Start: 2022-02-28 | End: 2022-02-28 | Stop reason: HOSPADM

## 2022-02-28 RX ORDER — LIDOCAINE HYDROCHLORIDE 10 MG/ML
1 INJECTION, SOLUTION EPIDURAL; INFILTRATION; INTRACAUDAL; PERINEURAL ONCE
Status: DISCONTINUED | OUTPATIENT
Start: 2022-02-28 | End: 2022-02-28 | Stop reason: HOSPADM

## 2022-02-28 RX ORDER — DIPHENHYDRAMINE HYDROCHLORIDE 50 MG/ML
25 INJECTION INTRAMUSCULAR; INTRAVENOUS EVERY 6 HOURS PRN
Status: DISCONTINUED | OUTPATIENT
Start: 2022-02-28 | End: 2022-02-28 | Stop reason: HOSPADM

## 2022-02-28 RX ORDER — HYDROMORPHONE HYDROCHLORIDE 2 MG/ML
0.2 INJECTION, SOLUTION INTRAMUSCULAR; INTRAVENOUS; SUBCUTANEOUS EVERY 5 MIN PRN
Status: DISCONTINUED | OUTPATIENT
Start: 2022-02-28 | End: 2022-02-28 | Stop reason: HOSPADM

## 2022-02-28 RX ORDER — NEOSTIGMINE METHYLSULFATE 1 MG/ML
INJECTION, SOLUTION INTRAVENOUS
Status: DISCONTINUED | OUTPATIENT
Start: 2022-02-28 | End: 2022-02-28

## 2022-02-28 RX ORDER — MIDAZOLAM HYDROCHLORIDE 1 MG/ML
INJECTION, SOLUTION INTRAMUSCULAR; INTRAVENOUS
Status: DISCONTINUED | OUTPATIENT
Start: 2022-02-28 | End: 2022-02-28

## 2022-02-28 RX ORDER — OXYCODONE AND ACETAMINOPHEN 5; 325 MG/1; MG/1
1 TABLET ORAL EVERY 6 HOURS PRN
Qty: 16 TABLET | Refills: 0 | Status: SHIPPED | OUTPATIENT
Start: 2022-02-28 | End: 2022-03-07

## 2022-02-28 RX ORDER — DEXAMETHASONE SODIUM PHOSPHATE 4 MG/ML
INJECTION, SOLUTION INTRA-ARTICULAR; INTRALESIONAL; INTRAMUSCULAR; INTRAVENOUS; SOFT TISSUE
Status: DISCONTINUED | OUTPATIENT
Start: 2022-02-28 | End: 2022-02-28

## 2022-02-28 RX ORDER — ONDANSETRON 2 MG/ML
INJECTION INTRAMUSCULAR; INTRAVENOUS
Status: DISCONTINUED | OUTPATIENT
Start: 2022-02-28 | End: 2022-02-28

## 2022-02-28 RX ORDER — SODIUM CHLORIDE 9 MG/ML
INJECTION, SOLUTION INTRAVENOUS CONTINUOUS
Status: DISCONTINUED | OUTPATIENT
Start: 2022-02-28 | End: 2022-02-28 | Stop reason: HOSPADM

## 2022-02-28 RX ORDER — HYDROMORPHONE HYDROCHLORIDE 2 MG/ML
0.5 INJECTION, SOLUTION INTRAMUSCULAR; INTRAVENOUS; SUBCUTANEOUS EVERY 5 MIN PRN
Status: DISCONTINUED | OUTPATIENT
Start: 2022-02-28 | End: 2022-02-28 | Stop reason: HOSPADM

## 2022-02-28 RX ORDER — BUPIVACAINE HYDROCHLORIDE 5 MG/ML
INJECTION, SOLUTION PERINEURAL
Status: DISCONTINUED | OUTPATIENT
Start: 2022-02-28 | End: 2022-02-28 | Stop reason: HOSPADM

## 2022-02-28 RX ORDER — CEFAZOLIN SODIUM 2 G/50ML
2 SOLUTION INTRAVENOUS
Status: COMPLETED | OUTPATIENT
Start: 2022-02-28 | End: 2022-02-28

## 2022-02-28 RX ADMIN — FENTANYL CITRATE 50 MCG: 50 INJECTION, SOLUTION INTRAMUSCULAR; INTRAVENOUS at 11:02

## 2022-02-28 RX ADMIN — MIDAZOLAM 2 MG: 1 INJECTION INTRAMUSCULAR; INTRAVENOUS at 09:02

## 2022-02-28 RX ADMIN — ONDANSETRON 8 MG: 2 INJECTION, SOLUTION INTRAMUSCULAR; INTRAVENOUS at 09:02

## 2022-02-28 RX ADMIN — GLYCOPYRROLATE 0.2 MG: 0.2 INJECTION, SOLUTION INTRAMUSCULAR; INTRAVITREAL at 09:02

## 2022-02-28 RX ADMIN — SODIUM CHLORIDE, SODIUM LACTATE, POTASSIUM CHLORIDE, AND CALCIUM CHLORIDE: .6; .31; .03; .02 INJECTION, SOLUTION INTRAVENOUS at 11:02

## 2022-02-28 RX ADMIN — ROCURONIUM BROMIDE 10 MG: 10 INJECTION, SOLUTION INTRAVENOUS at 09:02

## 2022-02-28 RX ADMIN — ROCURONIUM BROMIDE 10 MG: 10 INJECTION, SOLUTION INTRAVENOUS at 10:02

## 2022-02-28 RX ADMIN — NEOSTIGMINE METHYLSULFATE 5 MG: 1 INJECTION INTRAVENOUS at 11:02

## 2022-02-28 RX ADMIN — LIDOCAINE HYDROCHLORIDE 75 MG: 20 INJECTION, SOLUTION EPIDURAL; INFILTRATION; INTRACAUDAL; PERINEURAL at 09:02

## 2022-02-28 RX ADMIN — SODIUM CHLORIDE, SODIUM LACTATE, POTASSIUM CHLORIDE, AND CALCIUM CHLORIDE: .6; .31; .03; .02 INJECTION, SOLUTION INTRAVENOUS at 09:02

## 2022-02-28 RX ADMIN — DEXAMETHASONE SODIUM PHOSPHATE 8 MG: 4 INJECTION, SOLUTION INTRA-ARTICULAR; INTRALESIONAL; INTRAMUSCULAR; INTRAVENOUS; SOFT TISSUE at 09:02

## 2022-02-28 RX ADMIN — CEFAZOLIN SODIUM 2 G: 2 SOLUTION INTRAVENOUS at 09:02

## 2022-02-28 RX ADMIN — FENTANYL CITRATE 100 MCG: 50 INJECTION, SOLUTION INTRAMUSCULAR; INTRAVENOUS at 09:02

## 2022-02-28 RX ADMIN — PROPOFOL 30 MG: 10 INJECTION, EMULSION INTRAVENOUS at 09:02

## 2022-02-28 RX ADMIN — PROPOFOL 150 MG: 10 INJECTION, EMULSION INTRAVENOUS at 09:02

## 2022-02-28 RX ADMIN — GLYCOPYRROLATE 0.8 MG: 0.2 INJECTION, SOLUTION INTRAMUSCULAR; INTRAVITREAL at 11:02

## 2022-02-28 RX ADMIN — ROCURONIUM BROMIDE 40 MG: 10 INJECTION, SOLUTION INTRAVENOUS at 09:02

## 2022-02-28 RX ADMIN — HYDROMORPHONE HYDROCHLORIDE 0.5 MG: 2 INJECTION, SOLUTION INTRAMUSCULAR; INTRAVENOUS; SUBCUTANEOUS at 12:02

## 2022-02-28 RX ADMIN — ROCURONIUM BROMIDE 10 MG: 10 INJECTION, SOLUTION INTRAVENOUS at 11:02

## 2022-02-28 RX ADMIN — ACETAMINOPHEN 1000 MG: 10 INJECTION, SOLUTION INTRAVENOUS at 11:02

## 2022-02-28 NOTE — ANESTHESIA POSTPROCEDURE EVALUATION
Anesthesia Post Evaluation    Patient: Jean Shaw     Procedure(s) Performed: Procedure(s) (LRB):  ROBOTIC REPAIR, HERNIA, VENTRAL (N/A)    Final Anesthesia Type: general      Patient location during evaluation: PACU  Patient participation: Yes- Able to Participate  Level of consciousness: awake and alert  Post-procedure vital signs: reviewed and stable  Pain management: adequate  Airway patency: patent    PONV status at discharge: No PONV  Anesthetic complications: no      Cardiovascular status: blood pressure returned to baseline  Respiratory status: unassisted  Hydration status: euvolemic  Follow-up not needed.          Vitals Value Taken Time   /81 02/28/22 1259   Temp 36.7 °C (98.1 °F) 02/28/22 1259   Pulse 71 02/28/22 1259   Resp 16 02/28/22 1259   SpO2 95 % 02/28/22 1259         Event Time   Out of Recovery 12:40:51         Pain/Adelaida Score: Pain Rating Prior to Med Admin: 7 (2/28/2022 12:00 PM)  Adelaida Score: 9 (2/28/2022 12:31 PM)

## 2022-02-28 NOTE — ANESTHESIA PROCEDURE NOTES
Intubation    Date/Time: 2/28/2022 9:26 AM  Performed by: Loulou Torre CRNA  Authorized by: Aaron Mcnally MD     Intubation:     Induction:  Intravenous    Intubated:  Postinduction    Mask Ventilation:  Easy mask    Attempts:  1    Attempted By:  CRNA    Method of Intubation:  Video laryngoscopy    Blade:  Ga 4    Laryngeal View Grade: Grade I - full view of cords      Difficult Airway Encountered?: No      Complications:  None    Airway Device:  Oral endotracheal tube    Airway Device Size:  7.5    Style/Cuff Inflation:  Cuffed    Inflation Amount (mL):  6    Tube secured:  22    Secured at:  The lips    Placement Verified By:  Capnometry    Complicating Factors:  None    Findings Post-Intubation:  BS equal bilateral  Notes:      Missing upper left incisor, multiple chipped and cracked teeth, all the same after intubation.

## 2022-02-28 NOTE — TRANSFER OF CARE
"Anesthesia Transfer of Care Note    Patient: Jean Shaw Jr.    Procedure(s) Performed: Procedure(s) (LRB):  ROBOTIC REPAIR, HERNIA, VENTRAL (N/A)    Patient location: PACU    Anesthesia Type: general    Transport from OR: Transported from OR on 6-10 L/min O2 by face mask with adequate spontaneous ventilation    Post pain: adequate analgesia    Post assessment: no apparent anesthetic complications    Post vital signs: stable    Level of consciousness: awake    Nausea/Vomiting: no nausea/vomiting    Complications: none    Transfer of care protocol was followed      Last vitals:   Visit Vitals  BP (!) 151/67   Pulse 70   Temp 36.6 °C (97.9 °F) (Skin)   Resp 16   Ht 5' 9" (1.753 m)   Wt 116.6 kg (257 lb)   SpO2 100%   BMI 37.95 kg/m²     "

## 2022-02-28 NOTE — PROGRESS NOTES
Patient has met PACU discharge criteria, VSS, pain well controlled. Family updated by phone. Released from PACU by Dr. Mcnally

## 2022-02-28 NOTE — PLAN OF CARE
Patient received from Recovery Room.  Patient is awake and alert.  [place].  Sister at bedside. Puncture sites X4 are well-approximated with [place].  IVF infusing.  Fluids offered.  Instructed on voiding before discharge.  Patient verbalized understanding.  Safety is maintained with stretcher at the lowest, wheels locked and side rails up. Call light within reach.  Will continue to monitor.

## 2022-02-28 NOTE — DISCHARGE SUMMARY
[facility] ([facility])  Discharge Note  Short Stay    Procedure(s) (LRB):  ROBOTIC REPAIR, HERNIA, VENTRAL (N/A)  (recurrent)    OUTCOME: Patient tolerated treatment/procedure well without complication and is now ready for discharge.    DISPOSITION: Home or Self Care    FINAL DIAGNOSIS:  Recurrent incarcerated ventral hernia    FOLLOWUP: In clinic 2 weeks    DISCHARGE INSTRUCTIONS:  No discharge procedures on file.     TIME SPENT ON DISCHARGE: 10 minutes

## 2022-02-28 NOTE — OP NOTE
PATIENT: Jean Shaw Jr.    MRN: [id number]    DATE OF PROCEDURE: 02/28/2022     PRE-OPERATIVE DIAGNOSIS:  Recurrent Ventral Hernia (Incarcerated)     POST-OPERATIVE DIAGNOSIS:  same     PROCEDURE: Robotic Assisted Laparoscopic Incarcerated Recurrent Ventral Hernia Repair with Mesh     SURGEON: Layton Acevedo M.D.    ASSISTANT: Franklyn Martinez M.D.     ANESTHESIA: General endotracheal.     ESTIMATED BLOOD LOSS: Minimal.     SPECIMEN: none    COMPLICATIONS: none     INDICATION: The patient is a 67-year-old male who presents to the clinic with a symptomatic recurrent incarcerated hernia.  R/B/A to hernia surgery were explained to the patient in detail.  The risks include, but are not limited to: bleeding, infection, re-operation, injury to surrounding structures,reaction to anesthesia, jared-operative cardiopulmonary events including PE/DVT, hernia recurrence, chronic pain, need for mesh removal, failure to resolve symptoms, and possible death. The patient stated a clear understanding of the risks and requests the procedure be done.    PROCEDURE IN DETAIL:  After surgical consent was obtained, the patient was transported to the operative theater and onto the operating room table in supine position.  General endotracheal anesthesia was administered without difficulty.  The patient's abdomen was prepped and draped in a standard sterile fashion.  Perioperative antibiotics were administered and a time-out was performed in order to ensure the proper patient and procedure.  An incision was made through the skin of the epigastric region and the fascia was identified, elevated, and divided sharply.  A laparoscopic trocar was inserted without difficulty and the abdomen was insufflated.  Three robotic trocars were inserted laterally under direct visualization.  The robot was docked without difficulty.  Upon examining the abdomen, we could identify the two separate hernias with incarceration.  The incarcerated omentum and small bowel  was reduced.  The hernia defect was 7 x 3 cm and it was closed using a 0 V lock running suture.  A piece of coated 6 x 8 inch mesh was secured using multiple running 3-0 v loc sutures.  It covered the hernia defect by multiple cm in all directions.  The trocars were all removed, the robot was undocked, and the abdomen was desufflated.  The incisions were irrigated out with sterile saline and reinjected with local anesthetic.  4-0 Monocryl sutures were used to close the incisions and they were dressed in a sterile fashion.  At the end of the procedure, the instrument, lap, and needle counts were all correct.  The patient was awoken from anesthesia having tolerated the procedure without difficulty was returned to the PACU in stable condition.  Patient's family was updated all questions were answered.     Layton Acevedo M.D., F.A.C.S.  [facility] and General Surgery  [facility] [place]

## 2022-02-28 NOTE — PLAN OF CARE
Patient is discharged home.  Discharge instructions on medications, signs and symptoms when to call the MD, diet, activity, post-op care and follow-up visits are given to the patient and patient's sister.  Both verbalized complete understanding on the above discharge instructions.  Patient voided per bathroom.  Patient received medications from [facility]patient [facility].  Patient is wheeled to the Exit per Outpatient Surgery staff.  Voiced no concerns.  Safety maintained.

## 2022-03-17 ENCOUNTER — OFFICE VISIT (OUTPATIENT)
Dept: SURGERY | Facility: CLINIC | Age: 67
End: 2022-03-17
Payer: MEDICARE

## 2022-03-17 VITALS
WEIGHT: 260.06 LBS | BODY MASS INDEX: 38.52 KG/M2 | SYSTOLIC BLOOD PRESSURE: 135 MMHG | DIASTOLIC BLOOD PRESSURE: 80 MMHG | HEART RATE: 101 BPM | HEIGHT: 69 IN

## 2022-03-17 DIAGNOSIS — K43.2 INCISIONAL HERNIA, WITHOUT OBSTRUCTION OR GANGRENE: Primary | ICD-10-CM

## 2022-03-17 PROCEDURE — 3288F PR FALLS RISK ASSESSMENT DOCUMENTED: ICD-10-PCS | Mod: CPTII,S$GLB,, | Performed by: SURGERY

## 2022-03-17 PROCEDURE — 3075F SYST BP GE 130 - 139MM HG: CPT | Mod: CPTII,S$GLB,, | Performed by: SURGERY

## 2022-03-17 PROCEDURE — 1160F PR REVIEW ALL MEDS BY PRESCRIBER/CLIN PHARMACIST DOCUMENTED: ICD-10-PCS | Mod: CPTII,S$GLB,, | Performed by: SURGERY

## 2022-03-17 PROCEDURE — 1125F AMNT PAIN NOTED PAIN PRSNT: CPT | Mod: CPTII,S$GLB,, | Performed by: SURGERY

## 2022-03-17 PROCEDURE — 99024 POSTOP FOLLOW-UP VISIT: CPT | Mod: S$GLB,,, | Performed by: SURGERY

## 2022-03-17 PROCEDURE — 3044F PR MOST RECENT HEMOGLOBIN A1C LEVEL <7.0%: ICD-10-PCS | Mod: CPTII,S$GLB,, | Performed by: SURGERY

## 2022-03-17 PROCEDURE — 1101F PT FALLS ASSESS-DOCD LE1/YR: CPT | Mod: CPTII,S$GLB,, | Performed by: SURGERY

## 2022-03-17 PROCEDURE — 99999 PR PBB SHADOW E&M-EST. PATIENT-LVL III: CPT | Mod: PBBFAC,,, | Performed by: SURGERY

## 2022-03-17 PROCEDURE — 1160F RVW MEDS BY RX/DR IN RCRD: CPT | Mod: CPTII,S$GLB,, | Performed by: SURGERY

## 2022-03-17 PROCEDURE — 1125F PR PAIN SEVERITY QUANTIFIED, PAIN PRESENT: ICD-10-PCS | Mod: CPTII,S$GLB,, | Performed by: SURGERY

## 2022-03-17 PROCEDURE — 1159F MED LIST DOCD IN RCRD: CPT | Mod: CPTII,S$GLB,, | Performed by: SURGERY

## 2022-03-17 PROCEDURE — 3288F FALL RISK ASSESSMENT DOCD: CPT | Mod: CPTII,S$GLB,, | Performed by: SURGERY

## 2022-03-17 PROCEDURE — 3079F PR MOST RECENT DIASTOLIC BLOOD PRESSURE 80-89 MM HG: ICD-10-PCS | Mod: CPTII,S$GLB,, | Performed by: SURGERY

## 2022-03-17 PROCEDURE — 3075F PR MOST RECENT SYSTOLIC BLOOD PRESS GE 130-139MM HG: ICD-10-PCS | Mod: CPTII,S$GLB,, | Performed by: SURGERY

## 2022-03-17 PROCEDURE — 99999 PR PBB SHADOW E&M-EST. PATIENT-LVL III: ICD-10-PCS | Mod: PBBFAC,,, | Performed by: SURGERY

## 2022-03-17 PROCEDURE — 1159F PR MEDICATION LIST DOCUMENTED IN MEDICAL RECORD: ICD-10-PCS | Mod: CPTII,S$GLB,, | Performed by: SURGERY

## 2022-03-17 PROCEDURE — 3008F PR BODY MASS INDEX (BMI) DOCUMENTED: ICD-10-PCS | Mod: CPTII,S$GLB,, | Performed by: SURGERY

## 2022-03-17 PROCEDURE — 1101F PR PT FALLS ASSESS DOC 0-1 FALLS W/OUT INJ PAST YR: ICD-10-PCS | Mod: CPTII,S$GLB,, | Performed by: SURGERY

## 2022-03-17 PROCEDURE — 3008F BODY MASS INDEX DOCD: CPT | Mod: CPTII,S$GLB,, | Performed by: SURGERY

## 2022-03-17 PROCEDURE — 3079F DIAST BP 80-89 MM HG: CPT | Mod: CPTII,S$GLB,, | Performed by: SURGERY

## 2022-03-17 PROCEDURE — 99024 PR POST-OP FOLLOW-UP VISIT: ICD-10-PCS | Mod: S$GLB,,, | Performed by: SURGERY

## 2022-03-17 PROCEDURE — 3044F HG A1C LEVEL LT 7.0%: CPT | Mod: CPTII,S$GLB,, | Performed by: SURGERY

## 2022-03-17 NOTE — PROGRESS NOTES
[facility]  POST-OP NOTE    HPI: Jean Shaw Jr. is a 67 y.o. male s/p recurrent VH surgery.  Doing well.      VITALS:  Vitals:    03/17/22 1323   BP: 135/80   Pulse: 101       PHYSICAL EXAM:  GEN: NAD  HEENT: NCAT  ABD: incisions C/D/I    PATHOLOGY:  Reviewed      ASSESSMENT & PLAN:  Continue light activity for 4 more weeks if possible   RTC as needed      Layton Acevedo M.D., F.A.C.S.  [facility] General [facility]  [facility] [place]

## 2022-03-24 ENCOUNTER — TELEPHONE (OUTPATIENT)
Dept: OPHTHALMOLOGY | Facility: CLINIC | Age: 67
End: 2022-03-24
Payer: MEDICARE

## 2022-04-06 DIAGNOSIS — E78.00 PURE HYPERCHOLESTEROLEMIA: ICD-10-CM

## 2022-04-06 RX ORDER — ATORVASTATIN CALCIUM 80 MG/1
TABLET, FILM COATED ORAL
Qty: 90 TABLET | Refills: 3 | Status: SHIPPED | OUTPATIENT
Start: 2022-04-06 | End: 2023-01-25

## 2022-04-06 NOTE — TELEPHONE ENCOUNTER
Refill Routing Note   Medication(s) are not appropriate for processing by [facility] for the following reason(s):      - Hospital admission since last office visit with PCP    [facility] action(s):  Defer          Medication reconciliation completed: No     Appointments  past 12m or future 3m with PCP    Date Provider   Last Visit   2/2/2022 Anni Beth, DO   Next Visit   7/27/2022 Anni Beth, DO   ED visits in past 90 days: 0        Note composed:3:01 PM 04/06/2022

## 2022-04-06 NOTE — TELEPHONE ENCOUNTER
No new care gaps identified.  Powered by [employer]. Reference number: [id number].   4/06/2022 6:16:50 AM CDT

## 2022-04-13 ENCOUNTER — TELEPHONE (OUTPATIENT)
Dept: OPHTHALMOLOGY | Facility: CLINIC | Age: 67
End: 2022-04-13
Payer: MEDICARE

## 2022-04-29 ENCOUNTER — OFFICE VISIT (OUTPATIENT)
Dept: OPHTHALMOLOGY | Facility: CLINIC | Age: 67
End: 2022-04-29
Attending: OPHTHALMOLOGY
Payer: MEDICARE

## 2022-04-29 DIAGNOSIS — H26.493 POSTERIOR CAPSULAR OPACIFICATION, BILATERAL: Primary | ICD-10-CM

## 2022-04-29 PROCEDURE — 1126F AMNT PAIN NOTED NONE PRSNT: CPT | Mod: CPTII,S$GLB,, | Performed by: OPHTHALMOLOGY

## 2022-04-29 PROCEDURE — 1126F PR PAIN SEVERITY QUANTIFIED, NO PAIN PRESENT: ICD-10-PCS | Mod: CPTII,S$GLB,, | Performed by: OPHTHALMOLOGY

## 2022-04-29 PROCEDURE — 1160F RVW MEDS BY RX/DR IN RCRD: CPT | Mod: CPTII,S$GLB,, | Performed by: OPHTHALMOLOGY

## 2022-04-29 PROCEDURE — 1101F PT FALLS ASSESS-DOCD LE1/YR: CPT | Mod: CPTII,S$GLB,, | Performed by: OPHTHALMOLOGY

## 2022-04-29 PROCEDURE — 3288F PR FALLS RISK ASSESSMENT DOCUMENTED: ICD-10-PCS | Mod: CPTII,S$GLB,, | Performed by: OPHTHALMOLOGY

## 2022-04-29 PROCEDURE — 99214 PR OFFICE/OUTPT VISIT, EST, LEVL IV, 30-39 MIN: ICD-10-PCS | Mod: 57,S$GLB,, | Performed by: OPHTHALMOLOGY

## 2022-04-29 PROCEDURE — 66821 PR DISCISSION,2ND CATARACT,LASER: ICD-10-PCS | Mod: 50,S$GLB,, | Performed by: OPHTHALMOLOGY

## 2022-04-29 PROCEDURE — 1101F PR PT FALLS ASSESS DOC 0-1 FALLS W/OUT INJ PAST YR: ICD-10-PCS | Mod: CPTII,S$GLB,, | Performed by: OPHTHALMOLOGY

## 2022-04-29 PROCEDURE — 3288F FALL RISK ASSESSMENT DOCD: CPT | Mod: CPTII,S$GLB,, | Performed by: OPHTHALMOLOGY

## 2022-04-29 PROCEDURE — 1159F MED LIST DOCD IN RCRD: CPT | Mod: CPTII,S$GLB,, | Performed by: OPHTHALMOLOGY

## 2022-04-29 PROCEDURE — 66821 AFTER CATARACT LASER SURGERY: CPT | Mod: 50,S$GLB,, | Performed by: OPHTHALMOLOGY

## 2022-04-29 PROCEDURE — 1160F PR REVIEW ALL MEDS BY PRESCRIBER/CLIN PHARMACIST DOCUMENTED: ICD-10-PCS | Mod: CPTII,S$GLB,, | Performed by: OPHTHALMOLOGY

## 2022-04-29 PROCEDURE — 99999 PR PBB SHADOW E&M-EST. PATIENT-LVL III: ICD-10-PCS | Mod: PBBFAC,,, | Performed by: OPHTHALMOLOGY

## 2022-04-29 PROCEDURE — 3044F HG A1C LEVEL LT 7.0%: CPT | Mod: CPTII,S$GLB,, | Performed by: OPHTHALMOLOGY

## 2022-04-29 PROCEDURE — 1159F PR MEDICATION LIST DOCUMENTED IN MEDICAL RECORD: ICD-10-PCS | Mod: CPTII,S$GLB,, | Performed by: OPHTHALMOLOGY

## 2022-04-29 PROCEDURE — 99214 OFFICE O/P EST MOD 30 MIN: CPT | Mod: 57,S$GLB,, | Performed by: OPHTHALMOLOGY

## 2022-04-29 PROCEDURE — 99999 PR PBB SHADOW E&M-EST. PATIENT-LVL III: CPT | Mod: PBBFAC,,, | Performed by: OPHTHALMOLOGY

## 2022-04-29 PROCEDURE — 3044F PR MOST RECENT HEMOGLOBIN A1C LEVEL <7.0%: ICD-10-PCS | Mod: CPTII,S$GLB,, | Performed by: OPHTHALMOLOGY

## 2022-04-29 NOTE — PROGRESS NOTES
HPI     66 y/o male is here for YAG CAP. Vision is blurry OS>OD.  He has noticed   that he is not seeing as well as he used too.      No eye drops    Last edited by Nathalie Thomas on 4/29/2022  8:46 AM. (History)            Assessment /Plan     For exam results, see Encounter Report.    Posterior capsular opacification, bilateral      Visually significant posterior capsular opacity present.  Discussed risks, benefits, and alternatives to laser surgery.  YAG laser capsulotomy Procedure Note:   Informed consent obtained and correct eye(s) verified with patient.  1 drop of topical Proparacaine and Iopidine instilled, and eye(s) dilated with 1% Tropicamide 2.5% Phenylephrine.  YAG laser applied to posterior capsule in cruciate pattern OU  Patient tolerated procedure well. No complications. Follow up in 1 month/PRN.

## 2022-05-31 DIAGNOSIS — K21.9 GASTROESOPHAGEAL REFLUX DISEASE WITHOUT ESOPHAGITIS: Chronic | ICD-10-CM

## 2022-05-31 NOTE — TELEPHONE ENCOUNTER
Care Due:                  Date            Visit Type   Department     Provider  --------------------------------------------------------------------------------                                EP -                              PRIMARY      [facility]  Last Visit: 02-      CARE ([facility])   [facility] Andrea Beth                               -                              PRIMARY      [facility]  Next Visit: 07-      [facility]   [facility]                                                            Last  Test          Frequency    Reason                     Performed    Due Date  --------------------------------------------------------------------------------    Lipid Panel.  12 months..  atorvastatin.............  07- 07-    [employer] Embedded Care Gaps. Reference number: [id number]. 5/31/2022   4:57:35 PM CDT

## 2022-06-01 RX ORDER — OMEPRAZOLE 20 MG/1
CAPSULE, DELAYED RELEASE ORAL
Qty: 90 CAPSULE | Refills: 3 | Status: SHIPPED | OUTPATIENT
Start: 2022-06-01 | End: 2023-05-29

## 2022-07-27 ENCOUNTER — OFFICE VISIT (OUTPATIENT)
Dept: FAMILY MEDICINE | Facility: CLINIC | Age: 67
End: 2022-07-27
Payer: MEDICARE

## 2022-07-27 VITALS
SYSTOLIC BLOOD PRESSURE: 130 MMHG | DIASTOLIC BLOOD PRESSURE: 70 MMHG | HEIGHT: 69 IN | HEART RATE: 56 BPM | BODY MASS INDEX: 38.14 KG/M2 | WEIGHT: 257.5 LBS | OXYGEN SATURATION: 98 %

## 2022-07-27 DIAGNOSIS — N40.1 BENIGN PROSTATIC HYPERPLASIA WITH NOCTURIA: ICD-10-CM

## 2022-07-27 DIAGNOSIS — R79.89 ELEVATED LFTS: ICD-10-CM

## 2022-07-27 DIAGNOSIS — R35.1 BENIGN PROSTATIC HYPERPLASIA WITH NOCTURIA: ICD-10-CM

## 2022-07-27 DIAGNOSIS — I25.10 CORONARY ARTERY DISEASE INVOLVING NATIVE CORONARY ARTERY OF NATIVE HEART WITHOUT ANGINA PECTORIS: ICD-10-CM

## 2022-07-27 DIAGNOSIS — Z80.42 FAMILY HISTORY OF PROSTATE CANCER: ICD-10-CM

## 2022-07-27 DIAGNOSIS — E66.01 CLASS 2 SEVERE OBESITY DUE TO EXCESS CALORIES WITH SERIOUS COMORBIDITY AND BODY MASS INDEX (BMI) OF 38.0 TO 38.9 IN ADULT: ICD-10-CM

## 2022-07-27 DIAGNOSIS — J45.20 MILD INTERMITTENT ASTHMA WITHOUT COMPLICATION: ICD-10-CM

## 2022-07-27 DIAGNOSIS — E78.00 PURE HYPERCHOLESTEROLEMIA: ICD-10-CM

## 2022-07-27 DIAGNOSIS — I10 ESSENTIAL HYPERTENSION: Primary | ICD-10-CM

## 2022-07-27 DIAGNOSIS — R73.03 PREDIABETES: ICD-10-CM

## 2022-07-27 PROBLEM — K43.0 RECURRENT VENTRAL HERNIA WITH INCARCERATION: Status: RESOLVED | Noted: 2022-02-28 | Resolved: 2022-07-27

## 2022-07-27 PROBLEM — K43.2 INCISIONAL HERNIA, WITHOUT OBSTRUCTION OR GANGRENE: Status: RESOLVED | Noted: 2022-01-20 | Resolved: 2022-07-27

## 2022-07-27 PROBLEM — E66.812 CLASS 2 SEVERE OBESITY DUE TO EXCESS CALORIES WITH SERIOUS COMORBIDITY AND BODY MASS INDEX (BMI) OF 38.0 TO 38.9 IN ADULT: Status: ACTIVE | Noted: 2018-07-11

## 2022-07-27 PROCEDURE — 3008F BODY MASS INDEX DOCD: CPT | Mod: CPTII,S$GLB,, | Performed by: FAMILY MEDICINE

## 2022-07-27 PROCEDURE — 4010F ACE/ARB THERAPY RXD/TAKEN: CPT | Mod: CPTII,S$GLB,, | Performed by: FAMILY MEDICINE

## 2022-07-27 PROCEDURE — 99214 PR OFFICE/OUTPT VISIT, EST, LEVL IV, 30-39 MIN: ICD-10-PCS | Mod: S$GLB,,, | Performed by: FAMILY MEDICINE

## 2022-07-27 PROCEDURE — 3044F PR MOST RECENT HEMOGLOBIN A1C LEVEL <7.0%: ICD-10-PCS | Mod: CPTII,S$GLB,, | Performed by: FAMILY MEDICINE

## 2022-07-27 PROCEDURE — 1160F RVW MEDS BY RX/DR IN RCRD: CPT | Mod: CPTII,S$GLB,, | Performed by: FAMILY MEDICINE

## 2022-07-27 PROCEDURE — 1160F PR REVIEW ALL MEDS BY PRESCRIBER/CLIN PHARMACIST DOCUMENTED: ICD-10-PCS | Mod: CPTII,S$GLB,, | Performed by: FAMILY MEDICINE

## 2022-07-27 PROCEDURE — 3288F FALL RISK ASSESSMENT DOCD: CPT | Mod: CPTII,S$GLB,, | Performed by: FAMILY MEDICINE

## 2022-07-27 PROCEDURE — 3288F PR FALLS RISK ASSESSMENT DOCUMENTED: ICD-10-PCS | Mod: CPTII,S$GLB,, | Performed by: FAMILY MEDICINE

## 2022-07-27 PROCEDURE — 3075F PR MOST RECENT SYSTOLIC BLOOD PRESS GE 130-139MM HG: ICD-10-PCS | Mod: CPTII,S$GLB,, | Performed by: FAMILY MEDICINE

## 2022-07-27 PROCEDURE — 1126F PR PAIN SEVERITY QUANTIFIED, NO PAIN PRESENT: ICD-10-PCS | Mod: CPTII,S$GLB,, | Performed by: FAMILY MEDICINE

## 2022-07-27 PROCEDURE — 3078F DIAST BP <80 MM HG: CPT | Mod: CPTII,S$GLB,, | Performed by: FAMILY MEDICINE

## 2022-07-27 PROCEDURE — 99999 PR PBB SHADOW E&M-EST. PATIENT-LVL IV: CPT | Mod: PBBFAC,,, | Performed by: FAMILY MEDICINE

## 2022-07-27 PROCEDURE — 3044F HG A1C LEVEL LT 7.0%: CPT | Mod: CPTII,S$GLB,, | Performed by: FAMILY MEDICINE

## 2022-07-27 PROCEDURE — 3078F PR MOST RECENT DIASTOLIC BLOOD PRESSURE < 80 MM HG: ICD-10-PCS | Mod: CPTII,S$GLB,, | Performed by: FAMILY MEDICINE

## 2022-07-27 PROCEDURE — 3075F SYST BP GE 130 - 139MM HG: CPT | Mod: CPTII,S$GLB,, | Performed by: FAMILY MEDICINE

## 2022-07-27 PROCEDURE — 1101F PT FALLS ASSESS-DOCD LE1/YR: CPT | Mod: CPTII,S$GLB,, | Performed by: FAMILY MEDICINE

## 2022-07-27 PROCEDURE — 4010F PR ACE/ARB THEARPY RXD/TAKEN: ICD-10-PCS | Mod: CPTII,S$GLB,, | Performed by: FAMILY MEDICINE

## 2022-07-27 PROCEDURE — 1101F PR PT FALLS ASSESS DOC 0-1 FALLS W/OUT INJ PAST YR: ICD-10-PCS | Mod: CPTII,S$GLB,, | Performed by: FAMILY MEDICINE

## 2022-07-27 PROCEDURE — 99999 PR PBB SHADOW E&M-EST. PATIENT-LVL IV: ICD-10-PCS | Mod: PBBFAC,,, | Performed by: FAMILY MEDICINE

## 2022-07-27 PROCEDURE — 3008F PR BODY MASS INDEX (BMI) DOCUMENTED: ICD-10-PCS | Mod: CPTII,S$GLB,, | Performed by: FAMILY MEDICINE

## 2022-07-27 PROCEDURE — 99214 OFFICE O/P EST MOD 30 MIN: CPT | Mod: S$GLB,,, | Performed by: FAMILY MEDICINE

## 2022-07-27 PROCEDURE — 1126F AMNT PAIN NOTED NONE PRSNT: CPT | Mod: CPTII,S$GLB,, | Performed by: FAMILY MEDICINE

## 2022-07-27 PROCEDURE — 1159F MED LIST DOCD IN RCRD: CPT | Mod: CPTII,S$GLB,, | Performed by: FAMILY MEDICINE

## 2022-07-27 PROCEDURE — 1159F PR MEDICATION LIST DOCUMENTED IN MEDICAL RECORD: ICD-10-PCS | Mod: CPTII,S$GLB,, | Performed by: FAMILY MEDICINE

## 2022-07-27 NOTE — PROGRESS NOTES
FAMILY MEDICINE  [facility]    Reason for visit:   Chief Complaint   Patient presents with    Follow-up     6 month follow up       SUBJECTIVE: Jean Shaw Jr. is a 67 y.o. male  - with obesity, CAD (2014 angiogram proximal LAD 20% and LCA OM1 70% stenosis), hypertension, hyperlipidemia, tricuspid regurgitation, asthma, GERD and porphyria cutanea tarda presents to follow-up labs and blood pressure     Cardiologist: Dr. Lamont Downey Abimael    Today he reports that he is doing well and denies any concerns. Since last visit he reports that his younger brother 64 yo was just diagnosed with prostate cancer and they are monitoring. Patient does not + nocturia about 3-4 times a night. His PSA has been slowly increasing last 2 years. Denies straining, daytime frequency or hestitancy.      1. Hypertension     Current medication treatment:   lisinopril-hydrochlorothiazide (PRINZIDE,ZESTORETIC) 20-25 mg Tab, TAKE 1 TABLET BY MOUTH ONCE DAILY, Disp: 90 tablet, Rfl: 3     Medication side effects: denies     Home BP cuff: none      2. Hyperlipidemia with CAD LDL goal <70     Current treatment:  atorvastatin (LIPITOR) 80 MG tablet, Take 1 tablet (80 mg total) by mouth every evening., Disp: 90 tablet, Rfl: 3     Side effects from current treatment: denies     Lab Results       Component                Value               Date                       CHOL                     127                 07/22/2022                 CHOL                     119 (L)             07/16/2021                 CHOL                     123                 07/02/2020            Lab Results       Component                Value               Date                       HDL                      42                  07/22/2022                 HDL                      36 (L)              07/16/2021                 HDL                      39 (L)              07/02/2020            Lab Results       Component                 Value               Date                       LDLCALC                  72.0                07/22/2022                 LDLCALC                  69.0                07/16/2021                 LDLCALC                  69.4                07/02/2020            Lab Results       Component                Value               Date                       TRIG                     65                  07/22/2022                 TRIG                     70                  07/16/2021                 TRIG                     73                  07/02/2020            Lab Results       Component                Value               Date                       CHOLHDL                  33.1                07/22/2022                 CHOLHDL                  30.3                07/16/2021                 CHOLHDL                  31.7                07/02/2020              3. Asthma  - mild intermittent     Age diagnosed: childhood  Prior hospitalizations: denies  History of intubation: denies     Current medications  albuterol (PROVENTIL/VENTOLIN HFA) 90 mcg/actuation inhaler, Inhale 2 puffs into the lungs every 6 (six) hours as needed for Wheezing., Disp: 18 g, Rfl: 5     Current symptoms: denies  Recent exacerbations: 2021 x2, 1/20/22   How often using rescue inhaler? No using well controlled     Last PFT:  8/30/2016  DATE OF STUDY:  08/30/2016  SPIROMETRY  1.  Study meets validity.  2.  Spirometry reveals no evidence of obstruction.  3.  Sats 98% on room air  AGE/HN  dd: 12/18/2016 16:57:35 (CST)  td: 12/19/2016 05:52:17 (CST)  Doc ID   #[id number]  Job ID #[id number]        Vaccines:   Influenza:  Up to date  Prevnar 13:  07/09/2020  Pneumovax 23:  07/11/2018  COVID-19:  Recommended booster                                 Review of Systems   All other systems reviewed and are negative.      HEALTH MAINTENANCE:   Health Maintenance   Topic Date Due    Aspirin/Antiplatelet Therapy  Never done    PROSTATE-SPECIFIC ANTIGEN  07/22/2023     Lipid Panel  07/22/2023    High Dose Statin  07/27/2023    TETANUS VACCINE  05/09/2026    Hepatitis C Screening  Completed     Health Maintenance Topics with due status: Not Due       Topic Last Completion Date    TETANUS VACCINE 05/09/2016    Pneumococcal Vaccines (Age 65+) 07/09/2020    Colorectal Cancer Screening 06/01/2021    Influenza Vaccine 11/16/2021    PROSTATE-SPECIFIC ANTIGEN 07/22/2022    Lipid Panel 07/22/2022    High Dose Statin 07/27/2022     Health Maintenance Due   Topic Date Due    Aspirin/Antiplatelet Therapy  Never done    COVID-19 Vaccine (3 - Booster for Moderna series) 09/13/2021       HISTORY:   Past Medical History:   Diagnosis Date    Allergy     Asthma     Cataract 3/14/2019    Scheduled for left cataract extraction on 03/26/2019    Combined forms of age-related cataract of both eyes 3/26/2019    Coronary artery disease     sm blockage per heart    Elevated LFTs 7/20/2021    Lab Results Component Value Date  ALT 39 07/22/2022  AST 37 07/22/2022  ALKPHOS 101 07/22/2022  BILITOT 0.5 07/22/2022  - resolved - monitor - discussed recommendation for diet and cardiovascular exercise - counseling on lifestyle modifications for risk factor reduction    Fever blister     GERD (gastroesophageal reflux disease)     History of left cataract extraction 5/7/2019    Hyperlipidemia     Hypertension     Incisional hernia, without obstruction or gangrene 1/20/2022    - increased symptoms and episodes of pain - recommend Surgery evaluation    Nasal polyps 1/15/2015    Onychomycosis due to dermatophyte 8/28/2019    Recurrent ventral hernia with incarceration 2/28/2022       Past Surgical History:   Procedure Laterality Date    CARDIAC CATHETERIZATION  10/23/14    CATARACT EXTRACTION W/  INTRAOCULAR LENS IMPLANT Left 3/26/2019    Procedure: EXTRACTION, CATARACT, WITH IOL INSERTION;  Surgeon: Wilmar Jo MD;  Location: [facility];  Service: Ophthalmology;  Laterality: Left;     CATARACT EXTRACTION W/  INTRAOCULAR LENS IMPLANT Right 11/9/2020    Procedure: EXTRACTION, CATARACT, WITH IOL INSERTION;  Surgeon: Gladys Santiago MD;  Location: [facility];  Service: Ophthalmology;  Laterality: Right;    COLONOSCOPY      HERNIA REPAIR  2011    HERNIA REPAIR      HERNIA REPAIR      ROBOT-ASSISTED LAPAROSCOPIC REPAIR OF VENTRAL HERNIA N/A 2/28/2022    Procedure: ROBOTIC REPAIR, HERNIA, VENTRAL;  Surgeon: Layton Acevedo MD;  Location: [facility] OR;  Service: General;  Laterality: N/A;    SKIN BIOPSY         Family History   Problem Relation Age of Onset    Osteoporosis Mother     Seizures Mother     Dementia Mother     Stroke Mother     Stroke Father     Diabetes Sister     No Known Problems Brother     Stroke Maternal Grandfather     No Known Problems Sister        Social History     Tobacco Use    Smoking status: Never Smoker    Smokeless tobacco: Never Used   Substance Use Topics    Alcohol use: Not Currently     Alcohol/week: 0.0 standard drinks    Drug use: No       Social History     Social History Narrative    . No children. Self-employed and does construction work. Lives with sister Fredi on their family property. Enjoys gardening and has made wine in the past. Has 3 siblings. Sister Antonia Gibson. Close with family       ALLERGIES:   Review of patient's allergies indicates:  No Known Allergies    MEDS:     Current Outpatient Medications:     albuterol (PROVENTIL/VENTOLIN HFA) 90 mcg/actuation inhaler, Inhale 2 puffs into the lungs every 6 (six) hours as needed for Wheezing., Disp: 18 g, Rfl: 5    atorvastatin (LIPITOR) 80 MG tablet, TAKE 1 TABLET EVERY EVENING, Disp: 90 tablet, Rfl: 3    ibuprofen (ADVIL,MOTRIN) 800 MG tablet, Take 1 tablet (800 mg total) by mouth 3 (three) times daily., Disp: 90 tablet, Rfl: 1    lisinopriL-hydrochlorothiazide (PRINZIDE,ZESTORETIC) 20-25 mg Tab, TAKE 1 TABLET EVERY DAY, Disp: 90 tablet, Rfl: 3    omeprazole (PRILOSEC) 20 MG capsule,  "TAKE 1 CAPSULE EVERY DAY, Disp: 90 capsule, Rfl: 3    triamcinolone (NASACORT) 55 mcg nasal inhaler, 2 sprays by Nasal route 2 (two) times daily., Disp: , Rfl:     aspirin 81 MG Chew, Take 1 tablet (81 mg total) by mouth once daily., Disp: 90 tablet, Rfl: 11    diclofenac sodium (VOLTAREN) 1 % Gel, Apply 2 g topically 4 (four) times daily as needed (knee pain). (Patient not taking: No sig reported), Disp: 350 g, Rfl: 11    Current Facility-Administered Medications:     sodium chloride 0.9% flush 10 mL, 10 mL, Intravenous, PRN, Gladys Santiago MD      Vital signs:   Vitals:    07/27/22 1001   BP: 130/70   Pulse: (!) 56   SpO2: 98%   Weight: 116.8 kg (257 lb 8 oz)   Height: 5' 9" (1.753 m)     Body mass index is 38.03 kg/m².  PHYSICAL EXAM:     Physical Exam  Constitutional:       General: He is not in acute distress.  HENT:      Head: Normocephalic and atraumatic.   Cardiovascular:      Rate and Rhythm: Normal rate and regular rhythm.      Heart sounds: Normal heart sounds. No murmur heard.    No friction rub. No gallop.   Pulmonary:      Effort: Pulmonary effort is normal.      Breath sounds: Normal breath sounds. No wheezing, rhonchi or rales.   Musculoskeletal:      Cervical back: Neck supple.      Right lower leg: No edema.      Left lower leg: No edema.   Skin:     General: Skin is warm.   Neurological:      Mental Status: He is alert.           PHQ4 = PHQ-4 Score: 0    PERTINENT RESULTS:   Lab Visit on 07/22/2022   Component Date Value Ref Range Status    WBC 07/22/2022 7.83  3.90 - 12.70 K/uL Final    RBC 07/22/2022 4.79  4.60 - 6.20 M/uL Final    Hemoglobin 07/22/2022 14.2  14.0 - 18.0 g/dL Final    Hematocrit 07/22/2022 42.1  40.0 - 54.0 % Final    MCV 07/22/2022 88  82 - 98 fL Final    MCH 07/22/2022 29.6  27.0 - 31.0 pg Final    MCHC 07/22/2022 33.7  32.0 - 36.0 g/dL Final    RDW 07/22/2022 13.1  11.5 - 14.5 % Final    Platelets 07/22/2022 235  150 - 450 K/uL Final    MPV 07/22/2022 9.4  9.2 - " 12.9 fL Final    Immature Granulocytes 07/22/2022 0.3  0.0 - 0.5 % Final    Gran # (ANC) 07/22/2022 4.8  1.8 - 7.7 K/uL Final    Immature Grans (Abs) 07/22/2022 0.02  0.00 - 0.04 K/uL Final    Comment: Mild elevation in immature granulocytes is non specific and   can be seen in a variety of conditions including stress response,   acute inflammation, trauma and pregnancy. Correlation with other   laboratory and clinical findings is essential.      Lymph # 07/22/2022 2.1  1.0 - 4.8 K/uL Final    Mono # 07/22/2022 0.7  0.3 - 1.0 K/uL Final    Eos # 07/22/2022 0.2  0.0 - 0.5 K/uL Final    Baso # 07/22/2022 0.03  0.00 - 0.20 K/uL Final    nRBC 07/22/2022 0  0 /100 WBC Final    Gran % 07/22/2022 61.8  38.0 - 73.0 % Final    Lymph % 07/22/2022 26.8  18.0 - 48.0 % Final    Mono % 07/22/2022 8.4  4.0 - 15.0 % Final    Eosinophil % 07/22/2022 2.3  0.0 - 8.0 % Final    Basophil % 07/22/2022 0.4  0.0 - 1.9 % Final    Differential Method 07/22/2022 Automated   Final    Sodium 07/22/2022 139  136 - 145 mmol/L Final    Potassium 07/22/2022 4.2  3.5 - 5.1 mmol/L Final    Chloride 07/22/2022 99  95 - 110 mmol/L Final    CO2 07/22/2022 29  23 - 29 mmol/L Final    Glucose 07/22/2022 118 (A) 70 - 110 mg/dL Final    BUN 07/22/2022 13  2 - 20 mg/dL Final    Creatinine 07/22/2022 0.69  0.50 - 1.40 mg/dL Final    Calcium 07/22/2022 9.2  8.7 - 10.5 mg/dL Final    Total Protein 07/22/2022 7.5  6.0 - 8.4 g/dL Final    Albumin 07/22/2022 4.2  3.5 - 5.2 g/dL Final    Total Bilirubin 07/22/2022 0.5  0.1 - 1.0 mg/dL Final    Comment: For infants and newborns, interpretation of results should be based  on gestational age, weight and in agreement with clinical  observations.    Premature Infant recommended reference ranges:  Up to 24 hours.............<8.0 mg/dL  Up to 48 hours............<12.0 mg/dL  3-5 days..................<15.0 mg/dL  6-29 days.................<15.0 mg/dL      Alkaline Phosphatase 07/22/2022 101  38 -  126 U/L Final    AST 07/22/2022 37  15 - 46 U/L Final    ALT 07/22/2022 39  10 - 44 U/L Final    Anion Gap 07/22/2022 11  8 - 16 mmol/L Final    eGFR if African American 07/22/2022 >60.0  >60 mL/min/1.73 m^2 Final    eGFR if non African American 07/22/2022 >60.0  >60 mL/min/1.73 m^2 Final    Comment: Calculation used to obtain the estimated glomerular filtration  rate (eGFR) is the CKD-EPI equation.       Cholesterol 07/22/2022 127  120 - 199 mg/dL Final    Comment: The National Cholesterol Education Program (NCEP) has set the  following guidelines (reference ranges) for Cholesterol:  Optimal.....................<200 mg/dL  Borderline High.............200-239 mg/dL  High........................> or = 240 mg/dL      Triglycerides 07/22/2022 65  30 - 150 mg/dL Final    Comment: The National Cholesterol Education Program (NCEP) has set the  following guidelines (reference values) for triglycerides:  Normal......................<150 mg/dL  Borderline High.............150-199 mg/dL  High........................200-499 mg/dL      HDL 07/22/2022 42  40 - 75 mg/dL Final    Comment: The National Cholesterol Education Program (NCEP) has set the  following guidelines (reference values) for HDL Cholesterol:  Low...............<40 mg/dL  Optimal...........>60 mg/dL      LDL Cholesterol 07/22/2022 72.0  63.0 - 159.0 mg/dL Final    Comment: The National Cholesterol Education Program (NCEP) has set the  following guidelines (reference values) for LDL Cholesterol:  Optimal.......................<130 mg/dL  Borderline High...............130-159 mg/dL  High..........................160-189 mg/dL  Very High.....................>190 mg/dL      HDL/Cholesterol Ratio 07/22/2022 33.1  20.0 - 50.0 % Final    Total Cholesterol/HDL Ratio 07/22/2022 3.0  2.0 - 5.0 Final    Non-HDL Cholesterol 07/22/2022 85  mg/dL Final    Comment: Risk category and Non-HDL cholesterol goals:  Coronary heart disease (CHD)or equivalent (10-year risk  of CHD >20%):  Non-HDL cholesterol goal     <130 mg/dL  Two or more CHD risk factors and 10-year risk of CHD <= 20%:  Non-HDL cholesterol goal     <160 mg/dL  0 to 1 CHD risk factor:  Non-HDL cholesterol goal     <190 mg/dL      PSA, Screen 07/22/2022 3.0  0.00 - 4.00 ng/mL Final    Comment: The testing method is a chemiluminescent microparticle immunoassay   manufactured by [employer] and performed on the [employer]   or   BlueCava system. Values obtained with different assay manufacturers   for   methods may be different and cannot be used interchangeably.  PSA Expected levels:  Hormonal Therapy: <0.05 ng/ml  Prostatectomy: <0.01 ng/ml  Radiation Therapy: <1.00 ng/ml      Hemoglobin A1C 07/22/2022 5.9 (A) 4.0 - 5.6 % Final    Comment: ADA Screening Guidelines:  5.7-6.4%  Consistent with prediabetes  >or=6.5%  Consistent with diabetes    High levels of fetal hemoglobin interfere with the HbA1C  assay. Heterozygous hemoglobin variants (HbS, HgC, etc)do  not significantly interfere with this assay.   However, presence of multiple variants may affect accuracy.      Estimated Avg Glucose 07/22/2022 123  68 - 131 mg/dL Final       ASSESSMENT/PLAN:  Problem List Items Addressed This Visit        Pulmonary    Mild intermittent asthma without complication    Overview     - well controlled  - continue current management plan   - patient encouraged to notify me with any changes              Cardiac/Vascular    Coronary artery disease involving native coronary artery of native heart without angina pectoris    Overview     - CCS: 0  - 10/23/14 Angiogram LVEDP=7mmHg, 70% OM1 stenosis,  RCA has a 30% stenosis, LAD 20% stenosis   - goal LDL <70 on high dose statin  - BP goal < 130/80  - ASA 81 mg daily  - not on Beta-blocker due to bradycardia           Essential hypertension - Primary    Overview     - well controlled  - continue current medications           Pure hypercholesterolemia    Overview     Lab Results    Component Value Date    LDLCALC 72.0 07/22/2022     - well controlled  - continue current medication              Renal/    Benign prostatic hyperplasia with nocturia    Overview     PSA, Screen (ng/mL)   Date Value   07/22/2022 3.0     - recommend Urology evaluation           Relevant Orders    Ambulatory referral/consult to Urology       Oncology    Family history of prostate cancer    Overview     - 2022 brother diagnosed at 66 yo           Relevant Orders    Ambulatory referral/consult to Urology       Endocrine    Class 2 severe obesity due to excess calories with serious comorbidity and body mass index (BMI) of 38.0 to 38.9 in adult    Overview     - discussed recommendation for diet and cardiovascular exercise  - counseling on lifestyle modifications for risk factor reduction           Prediabetes    Overview     Lab Results   Component Value Date    HGBA1C 5.9 (H) 07/22/2022     - discussed recommendation for diet and cardiovascular exercise  - counseling on lifestyle modifications for risk factor reduction              GI    RESOLVED: Elevated LFTs    Overview     Lab Results   Component Value Date    ALT 39 07/22/2022    AST 37 07/22/2022    ALKPHOS 101 07/22/2022    BILITOT 0.5 07/22/2022     - resolved  - monitor  - discussed recommendation for diet and cardiovascular exercise  - counseling on lifestyle modifications for risk factor reduction                 ORDERS:   Orders Placed This Encounter    Ambulatory referral/consult to Urology       Vaccines recommended: covid-19 booster    Today I discussed that I will no longer be practice at [facility] effective 1/2023 and assisted patient in establishing with a new provider for their follow-up to avoid any lapse of care. I discussed with Jean Shaw Jr. that it is important to maintain routine follow-ups with a new provider and encouraged to notify me with an questions or concerns prior to my departure.     Follow-up in 6 months with new  provider or sooner if any concerns.      This note is dictated using the M*Modal Fluency Direct word recognition program. There are word recognition mistakes that are occasionally missed on review.    Dr. Anni Beth D.O.   [facility]

## 2022-08-02 ENCOUNTER — OFFICE VISIT (OUTPATIENT)
Dept: UROLOGY | Facility: CLINIC | Age: 67
End: 2022-08-02
Payer: MEDICARE

## 2022-08-02 VITALS
HEART RATE: 56 BPM | BODY MASS INDEX: 37.71 KG/M2 | SYSTOLIC BLOOD PRESSURE: 132 MMHG | OXYGEN SATURATION: 97 % | DIASTOLIC BLOOD PRESSURE: 84 MMHG | HEIGHT: 69 IN | WEIGHT: 254.63 LBS

## 2022-08-02 DIAGNOSIS — Z80.42 FAMILY HISTORY OF PROSTATE CANCER: ICD-10-CM

## 2022-08-02 DIAGNOSIS — R35.1 BENIGN PROSTATIC HYPERPLASIA WITH NOCTURIA: Primary | ICD-10-CM

## 2022-08-02 DIAGNOSIS — R97.20 INCREASED PROSTATE SPECIFIC ANTIGEN (PSA) VELOCITY: ICD-10-CM

## 2022-08-02 DIAGNOSIS — N40.1 BENIGN PROSTATIC HYPERPLASIA WITH NOCTURIA: Primary | ICD-10-CM

## 2022-08-02 PROCEDURE — 99204 PR OFFICE/OUTPT VISIT, NEW, LEVL IV, 45-59 MIN: ICD-10-PCS | Mod: S$GLB,,, | Performed by: NURSE PRACTITIONER

## 2022-08-02 PROCEDURE — 3288F PR FALLS RISK ASSESSMENT DOCUMENTED: ICD-10-PCS | Mod: CPTII,S$GLB,, | Performed by: NURSE PRACTITIONER

## 2022-08-02 PROCEDURE — 3044F HG A1C LEVEL LT 7.0%: CPT | Mod: CPTII,S$GLB,, | Performed by: NURSE PRACTITIONER

## 2022-08-02 PROCEDURE — 1160F RVW MEDS BY RX/DR IN RCRD: CPT | Mod: CPTII,S$GLB,, | Performed by: NURSE PRACTITIONER

## 2022-08-02 PROCEDURE — 4010F PR ACE/ARB THEARPY RXD/TAKEN: ICD-10-PCS | Mod: CPTII,S$GLB,, | Performed by: NURSE PRACTITIONER

## 2022-08-02 PROCEDURE — 99999 PR PBB SHADOW E&M-EST. PATIENT-LVL IV: CPT | Mod: PBBFAC,,, | Performed by: NURSE PRACTITIONER

## 2022-08-02 PROCEDURE — 1126F AMNT PAIN NOTED NONE PRSNT: CPT | Mod: CPTII,S$GLB,, | Performed by: NURSE PRACTITIONER

## 2022-08-02 PROCEDURE — 99204 OFFICE O/P NEW MOD 45 MIN: CPT | Mod: S$GLB,,, | Performed by: NURSE PRACTITIONER

## 2022-08-02 PROCEDURE — 3008F BODY MASS INDEX DOCD: CPT | Mod: CPTII,S$GLB,, | Performed by: NURSE PRACTITIONER

## 2022-08-02 PROCEDURE — 1101F PT FALLS ASSESS-DOCD LE1/YR: CPT | Mod: CPTII,S$GLB,, | Performed by: NURSE PRACTITIONER

## 2022-08-02 PROCEDURE — 1160F PR REVIEW ALL MEDS BY PRESCRIBER/CLIN PHARMACIST DOCUMENTED: ICD-10-PCS | Mod: CPTII,S$GLB,, | Performed by: NURSE PRACTITIONER

## 2022-08-02 PROCEDURE — 1159F PR MEDICATION LIST DOCUMENTED IN MEDICAL RECORD: ICD-10-PCS | Mod: CPTII,S$GLB,, | Performed by: NURSE PRACTITIONER

## 2022-08-02 PROCEDURE — 3008F PR BODY MASS INDEX (BMI) DOCUMENTED: ICD-10-PCS | Mod: CPTII,S$GLB,, | Performed by: NURSE PRACTITIONER

## 2022-08-02 PROCEDURE — 3075F PR MOST RECENT SYSTOLIC BLOOD PRESS GE 130-139MM HG: ICD-10-PCS | Mod: CPTII,S$GLB,, | Performed by: NURSE PRACTITIONER

## 2022-08-02 PROCEDURE — 99999 PR PBB SHADOW E&M-EST. PATIENT-LVL IV: ICD-10-PCS | Mod: PBBFAC,,, | Performed by: NURSE PRACTITIONER

## 2022-08-02 PROCEDURE — 1126F PR PAIN SEVERITY QUANTIFIED, NO PAIN PRESENT: ICD-10-PCS | Mod: CPTII,S$GLB,, | Performed by: NURSE PRACTITIONER

## 2022-08-02 PROCEDURE — 3288F FALL RISK ASSESSMENT DOCD: CPT | Mod: CPTII,S$GLB,, | Performed by: NURSE PRACTITIONER

## 2022-08-02 PROCEDURE — 3075F SYST BP GE 130 - 139MM HG: CPT | Mod: CPTII,S$GLB,, | Performed by: NURSE PRACTITIONER

## 2022-08-02 PROCEDURE — 3079F PR MOST RECENT DIASTOLIC BLOOD PRESSURE 80-89 MM HG: ICD-10-PCS | Mod: CPTII,S$GLB,, | Performed by: NURSE PRACTITIONER

## 2022-08-02 PROCEDURE — 3044F PR MOST RECENT HEMOGLOBIN A1C LEVEL <7.0%: ICD-10-PCS | Mod: CPTII,S$GLB,, | Performed by: NURSE PRACTITIONER

## 2022-08-02 PROCEDURE — 3079F DIAST BP 80-89 MM HG: CPT | Mod: CPTII,S$GLB,, | Performed by: NURSE PRACTITIONER

## 2022-08-02 PROCEDURE — 4010F ACE/ARB THERAPY RXD/TAKEN: CPT | Mod: CPTII,S$GLB,, | Performed by: NURSE PRACTITIONER

## 2022-08-02 PROCEDURE — 1101F PR PT FALLS ASSESS DOC 0-1 FALLS W/OUT INJ PAST YR: ICD-10-PCS | Mod: CPTII,S$GLB,, | Performed by: NURSE PRACTITIONER

## 2022-08-02 PROCEDURE — 1159F MED LIST DOCD IN RCRD: CPT | Mod: CPTII,S$GLB,, | Performed by: NURSE PRACTITIONER

## 2022-08-02 NOTE — PROGRESS NOTES
Subjective:       Patient ID: Jean Shaw Jr. is a 67 y.o. male.    Chief Complaint: Establish Care, Referral (Anni Beth MD 07/27/2022), Benign prstatic Hyyperplasia (With nocturia), and family history of prostate cancer    Patient is new to me. He is a 66 yo WM who is here today for evaluation of increased PSA velocity. Patient referred by his PCP (Dr. Beth). Brother diagnosed 3-4 months ago with prostate cancer. Patient denies any urology issues at this time and denies personal hx of BPH.   Benign Prostatic Hypertrophy  This is a new problem. Irritative symptoms include nocturia (x2-3). Irritative symptoms do not include urgency. Obstructive symptoms do not include dribbling, incomplete emptying, straining or a weak stream. Pertinent negatives include no chills, dysuria, genital pain, hematuria, hesitancy, nausea or vomiting. Nothing aggravates the symptoms. Past treatments include nothing.     Review of Systems   Constitutional: Negative for chills, fatigue and fever.   Gastrointestinal: Negative for abdominal pain, change in bowel habit, constipation, diarrhea, nausea, vomiting and change in bowel habit.   Genitourinary: Positive for nocturia (x2-3). Negative for decreased urine volume, difficulty urinating, discharge, dysuria, flank pain, hematuria, hesitancy, incomplete emptying, penile pain, penile swelling, scrotal swelling, testicular pain and urgency.        Nocturia but drinks fluids at bedtime.   Musculoskeletal: Positive for back pain.   Neurological: Negative for dizziness, weakness and headaches.   Psychiatric/Behavioral: Negative.          Objective:      Physical Exam  Vitals and nursing note reviewed.   Constitutional:       General: He is not in acute distress.     Appearance: He is well-developed. He is not ill-appearing.   HENT:      Head: Normocephalic and atraumatic.   Eyes:      Pupils: Pupils are equal, round, and reactive to light.   Cardiovascular:      Rate and Rhythm: Normal rate.    Pulmonary:      Effort: Pulmonary effort is normal. No respiratory distress.   Abdominal:      Palpations: Abdomen is soft.      Tenderness: There is no abdominal tenderness.   Genitourinary:     Prostate: Enlarged. Not tender and no nodules present.   Musculoskeletal:         General: Normal range of motion.      Cervical back: Normal range of motion.   Skin:     General: Skin is warm and dry.   Neurological:      Mental Status: He is alert and oriented to person, place, and time.      Coordination: Coordination normal.   Psychiatric:         Mood and Affect: Mood normal.         Behavior: Behavior normal.         Thought Content: Thought content normal.         Judgment: Judgment normal.         Assessment:       Problem List Items Addressed This Visit        Renal/    Benign prostatic hyperplasia with nocturia - Primary       Oncology    Family history of prostate cancer      Other Visit Diagnoses     Increased prostate specific antigen (PSA) velocity              Plan:         Jean was seen today for establish care, referral, benign prstatic hyyperplasia and family history of prostate cancer.    Diagnoses and all orders for this visit:    Benign prostatic hyperplasia with nocturia    Family history of prostate cancer    Increased prostate specific antigen (PSA) velocity    Other order  1. BARBI today    Follow-up in 1 year or sooner of needed.     Sammie Cr NP

## 2022-11-15 ENCOUNTER — TELEPHONE (OUTPATIENT)
Dept: CARDIOLOGY | Facility: CLINIC | Age: 67
End: 2022-11-15
Payer: MEDICARE

## 2022-11-15 NOTE — TELEPHONE ENCOUNTER
Called pt to confirm, pt was supposed to be coming in for a 1 year follow up, and it has only been 6mo (scheduled incorrectly)    Offered pt to wait another 6 months unless he has been having any issues pt stated he is not and would like to come back in 6 mo. Apt cancelled and recall made. Pt expressed understanding

## 2022-12-08 DIAGNOSIS — I10 ESSENTIAL HYPERTENSION: ICD-10-CM

## 2022-12-08 NOTE — TELEPHONE ENCOUNTER
No new care gaps identified.  [facility] Embedded Care Gaps. Reference number: [id number]. 12/08/2022   11:03:57 AM CST

## 2022-12-09 RX ORDER — LISINOPRIL AND HYDROCHLOROTHIAZIDE 20; 25 MG/1; MG/1
TABLET ORAL
Qty: 90 TABLET | Refills: 2 | Status: SHIPPED | OUTPATIENT
Start: 2022-12-09 | End: 2023-11-20

## 2022-12-09 NOTE — TELEPHONE ENCOUNTER
Refill Decision Note   Jean Shaw  is requesting a refill authorization.  Brief Assessment and Rationale for Refill:  Approve     Medication Therapy Plan:       Medication Reconciliation Completed: No   Comments:     No Care Gaps recommended.     Note composed:2:19 PM 12/09/2022

## 2023-02-07 DIAGNOSIS — Z00.00 ENCOUNTER FOR MEDICARE ANNUAL WELLNESS EXAM: ICD-10-CM

## 2023-02-09 DIAGNOSIS — Z00.00 ENCOUNTER FOR MEDICARE ANNUAL WELLNESS EXAM: ICD-10-CM

## 2023-04-03 ENCOUNTER — TELEPHONE (OUTPATIENT)
Dept: FAMILY MEDICINE | Facility: CLINIC | Age: 68
End: 2023-04-03
Payer: MEDICARE

## 2023-04-03 DIAGNOSIS — I10 ESSENTIAL HYPERTENSION: Primary | ICD-10-CM

## 2023-04-03 DIAGNOSIS — E78.00 PURE HYPERCHOLESTEROLEMIA: ICD-10-CM

## 2023-04-03 DIAGNOSIS — E66.01 CLASS 2 SEVERE OBESITY DUE TO EXCESS CALORIES WITH SERIOUS COMORBIDITY AND BODY MASS INDEX (BMI) OF 38.0 TO 38.9 IN ADULT: ICD-10-CM

## 2023-04-03 DIAGNOSIS — R73.03 PREDIABETES: ICD-10-CM

## 2023-04-03 NOTE — TELEPHONE ENCOUNTER
----- Message from Abbi Valencia sent at 4/3/2023  8:38 AM CDT -----  Regarding: Annual labs  Contact: [phone]  Caller is requesting to schedule their Lab appointment prior to annual appointment.  Order is not listed in EPIC.  Please enter order and contact patient to schedule.    Name of Caller: self     Preferred Date and Time of Labs: #    Date of [facility] Appointment: 07/10    Where would they like the lab performed? [facility]     Would the patient rather a call back or a response via My [employer]?  Call     Best Call Back Number: [phone]    Additional Information:

## 2023-04-29 LAB — HEMOCCULT STL QL IA: NEGATIVE

## 2023-05-06 ENCOUNTER — PATIENT MESSAGE (OUTPATIENT)
Dept: FAMILY MEDICINE | Facility: CLINIC | Age: 68
End: 2023-05-06
Payer: MEDICARE

## 2023-05-06 ENCOUNTER — TELEPHONE (OUTPATIENT)
Dept: FAMILY MEDICINE | Facility: CLINIC | Age: 68
End: 2023-05-06
Payer: MEDICARE

## 2023-05-31 ENCOUNTER — PATIENT OUTREACH (OUTPATIENT)
Dept: ADMINISTRATIVE | Facility: HOSPITAL | Age: 68
End: 2023-05-31
Payer: MEDICARE

## 2023-05-31 NOTE — PROGRESS NOTES
Non-compliant GAP report chart review - Chart review completed for the following  test if overdue  (Mammogram, Colonoscopy, Cervical Cancer Screening,  Diabetic lab testing, and/or Dilated EYE EXAM)  05/31/2023    Care Everywhere and Media reports - updates requested and reviewed.       Colonoscopy in          Health Maintenance Due   Topic Date Due    Aspirin/Antiplatelet Therapy  Never done    COVID-19 Vaccine (3 - Moderna series) 06/08/2021

## 2023-06-16 ENCOUNTER — PATIENT MESSAGE (OUTPATIENT)
Dept: PODIATRY | Facility: CLINIC | Age: 68
End: 2023-06-16
Payer: MEDICARE

## 2023-06-21 ENCOUNTER — OFFICE VISIT (OUTPATIENT)
Dept: OPTOMETRY | Facility: CLINIC | Age: 68
End: 2023-06-21
Payer: MEDICARE

## 2023-06-21 DIAGNOSIS — Z96.1 PSEUDOPHAKIA OF BOTH EYES: ICD-10-CM

## 2023-06-21 DIAGNOSIS — H52.4 PRESBYOPIA: ICD-10-CM

## 2023-06-21 DIAGNOSIS — R73.03 PREDIABETES: Primary | ICD-10-CM

## 2023-06-21 PROCEDURE — 4010F ACE/ARB THERAPY RXD/TAKEN: CPT | Mod: HCNC,CPTII,S$GLB, | Performed by: OPTOMETRIST

## 2023-06-21 PROCEDURE — 4010F PR ACE/ARB THEARPY RXD/TAKEN: ICD-10-PCS | Mod: HCNC,CPTII,S$GLB, | Performed by: OPTOMETRIST

## 2023-06-21 PROCEDURE — 92014 PR EYE EXAM, EST PATIENT,COMPREHESV: ICD-10-PCS | Mod: HCNC,S$GLB,, | Performed by: OPTOMETRIST

## 2023-06-21 PROCEDURE — 92014 COMPRE OPH EXAM EST PT 1/>: CPT | Mod: HCNC,S$GLB,, | Performed by: OPTOMETRIST

## 2023-06-21 PROCEDURE — 2023F PR DILATED RETINAL EXAM W/O EVID OF RETINOPATHY: ICD-10-PCS | Mod: HCNC,CPTII,S$GLB, | Performed by: OPTOMETRIST

## 2023-06-21 PROCEDURE — 2023F DILAT RTA XM W/O RTNOPTHY: CPT | Mod: HCNC,CPTII,S$GLB, | Performed by: OPTOMETRIST

## 2023-06-21 NOTE — PROGRESS NOTES
HPI    CC: Pt presents today for routine eye care. Wears OTC readers for near. Pt   reports no new vision concerns and denies eye pain.    LIN: 12/28/2021 with Dr. Luevano    (-) Changes in vision   (-) Pain  (-) Irritation   (-) Itching   (-) Flashes  (-) Floaters  (+) Glasses wearer, OTC readers +2.50  (-) CL wearer  (+) Uses eye gtts, AT's--PRN    Does patient want a refraction today? no    (-) Eye injury  (+) Eye surgery, Cataract/YAG  (-)POHx  (-)FOHx    (+)DM, Pre-DM  Hemoglobin A1C       Date                     Value               Ref Range             Status                07/22/2022               5.9 (H)             4.0 - 5.6 %           Final                  01/14/2022               6.0 (H)             4.0 - 5.6 %           Final                   07/16/2021               6.1 (H)             4.0 - 5.6 %           Final                   Last edited by Annie Daugherty, OD on 6/21/2023  1:04 PM.            Assessment /Plan     For exam results, see Encounter Report.    Prediabetes    Pseudophakia of both eyes    Presbyopia      No retinopathy noted, OU. Continue proper BS control and annual diabetic eye exams. Monitor yearly.      2. PCIOL clear and centered OU. S/p YAG OU. Monitor yearly.      3. Continue use of OTC reading glasses prn. Monitor yearly.        RTC in 1 year for annual eye exam or sooner if needed.

## 2023-07-10 ENCOUNTER — OFFICE VISIT (OUTPATIENT)
Dept: FAMILY MEDICINE | Facility: CLINIC | Age: 68
End: 2023-07-10
Payer: MEDICARE

## 2023-07-10 VITALS
TEMPERATURE: 98 F | HEIGHT: 69 IN | SYSTOLIC BLOOD PRESSURE: 121 MMHG | HEART RATE: 60 BPM | WEIGHT: 237.13 LBS | OXYGEN SATURATION: 98 % | BODY MASS INDEX: 35.12 KG/M2 | DIASTOLIC BLOOD PRESSURE: 72 MMHG

## 2023-07-10 DIAGNOSIS — R73.03 PREDIABETES: ICD-10-CM

## 2023-07-10 DIAGNOSIS — E66.01 CLASS 2 SEVERE OBESITY DUE TO EXCESS CALORIES WITH SERIOUS COMORBIDITY AND BODY MASS INDEX (BMI) OF 35.0 TO 35.9 IN ADULT: ICD-10-CM

## 2023-07-10 DIAGNOSIS — G89.29 CHRONIC PAIN OF RIGHT KNEE: ICD-10-CM

## 2023-07-10 DIAGNOSIS — M25.561 CHRONIC PAIN OF RIGHT KNEE: ICD-10-CM

## 2023-07-10 DIAGNOSIS — J45.20 MILD INTERMITTENT ASTHMA WITHOUT COMPLICATION: ICD-10-CM

## 2023-07-10 DIAGNOSIS — E80.1 PCT (PORPHYRIA CUTANEA TARDA): ICD-10-CM

## 2023-07-10 DIAGNOSIS — E78.00 PURE HYPERCHOLESTEROLEMIA: ICD-10-CM

## 2023-07-10 DIAGNOSIS — Z76.89 ENCOUNTER TO ESTABLISH CARE WITH NEW DOCTOR: Primary | ICD-10-CM

## 2023-07-10 DIAGNOSIS — K21.9 GASTROESOPHAGEAL REFLUX DISEASE WITHOUT ESOPHAGITIS: Chronic | ICD-10-CM

## 2023-07-10 DIAGNOSIS — J45.21 MILD INTERMITTENT ASTHMA WITH ACUTE EXACERBATION: ICD-10-CM

## 2023-07-10 DIAGNOSIS — N40.1 BENIGN PROSTATIC HYPERPLASIA WITH NOCTURIA: ICD-10-CM

## 2023-07-10 DIAGNOSIS — R35.1 BENIGN PROSTATIC HYPERPLASIA WITH NOCTURIA: ICD-10-CM

## 2023-07-10 DIAGNOSIS — Z80.42 FAMILY HISTORY OF PROSTATE CANCER: ICD-10-CM

## 2023-07-10 DIAGNOSIS — I70.0 AORTIC ATHEROSCLEROSIS: ICD-10-CM

## 2023-07-10 DIAGNOSIS — I25.10 CORONARY ARTERY DISEASE INVOLVING NATIVE CORONARY ARTERY OF NATIVE HEART WITHOUT ANGINA PECTORIS: ICD-10-CM

## 2023-07-10 DIAGNOSIS — I36.1 NON-RHEUMATIC TRICUSPID VALVE INSUFFICIENCY: ICD-10-CM

## 2023-07-10 DIAGNOSIS — I10 ESSENTIAL HYPERTENSION: ICD-10-CM

## 2023-07-10 PROCEDURE — 1101F PR PT FALLS ASSESS DOC 0-1 FALLS W/OUT INJ PAST YR: ICD-10-PCS | Mod: HCNC,CPTII,S$GLB, | Performed by: STUDENT IN AN ORGANIZED HEALTH CARE EDUCATION/TRAINING PROGRAM

## 2023-07-10 PROCEDURE — 4010F ACE/ARB THERAPY RXD/TAKEN: CPT | Mod: HCNC,CPTII,S$GLB, | Performed by: STUDENT IN AN ORGANIZED HEALTH CARE EDUCATION/TRAINING PROGRAM

## 2023-07-10 PROCEDURE — 99215 OFFICE O/P EST HI 40 MIN: CPT | Mod: HCNC,S$GLB,, | Performed by: STUDENT IN AN ORGANIZED HEALTH CARE EDUCATION/TRAINING PROGRAM

## 2023-07-10 PROCEDURE — 1159F PR MEDICATION LIST DOCUMENTED IN MEDICAL RECORD: ICD-10-PCS | Mod: HCNC,CPTII,S$GLB, | Performed by: STUDENT IN AN ORGANIZED HEALTH CARE EDUCATION/TRAINING PROGRAM

## 2023-07-10 PROCEDURE — 1101F PT FALLS ASSESS-DOCD LE1/YR: CPT | Mod: HCNC,CPTII,S$GLB, | Performed by: STUDENT IN AN ORGANIZED HEALTH CARE EDUCATION/TRAINING PROGRAM

## 2023-07-10 PROCEDURE — 3078F DIAST BP <80 MM HG: CPT | Mod: HCNC,CPTII,S$GLB, | Performed by: STUDENT IN AN ORGANIZED HEALTH CARE EDUCATION/TRAINING PROGRAM

## 2023-07-10 PROCEDURE — 1160F RVW MEDS BY RX/DR IN RCRD: CPT | Mod: HCNC,CPTII,S$GLB, | Performed by: STUDENT IN AN ORGANIZED HEALTH CARE EDUCATION/TRAINING PROGRAM

## 2023-07-10 PROCEDURE — 3044F HG A1C LEVEL LT 7.0%: CPT | Mod: HCNC,CPTII,S$GLB, | Performed by: STUDENT IN AN ORGANIZED HEALTH CARE EDUCATION/TRAINING PROGRAM

## 2023-07-10 PROCEDURE — 3074F SYST BP LT 130 MM HG: CPT | Mod: HCNC,CPTII,S$GLB, | Performed by: STUDENT IN AN ORGANIZED HEALTH CARE EDUCATION/TRAINING PROGRAM

## 2023-07-10 PROCEDURE — 3008F PR BODY MASS INDEX (BMI) DOCUMENTED: ICD-10-PCS | Mod: HCNC,CPTII,S$GLB, | Performed by: STUDENT IN AN ORGANIZED HEALTH CARE EDUCATION/TRAINING PROGRAM

## 2023-07-10 PROCEDURE — 3078F PR MOST RECENT DIASTOLIC BLOOD PRESSURE < 80 MM HG: ICD-10-PCS | Mod: HCNC,CPTII,S$GLB, | Performed by: STUDENT IN AN ORGANIZED HEALTH CARE EDUCATION/TRAINING PROGRAM

## 2023-07-10 PROCEDURE — 3074F PR MOST RECENT SYSTOLIC BLOOD PRESSURE < 130 MM HG: ICD-10-PCS | Mod: HCNC,CPTII,S$GLB, | Performed by: STUDENT IN AN ORGANIZED HEALTH CARE EDUCATION/TRAINING PROGRAM

## 2023-07-10 PROCEDURE — 1126F AMNT PAIN NOTED NONE PRSNT: CPT | Mod: HCNC,CPTII,S$GLB, | Performed by: STUDENT IN AN ORGANIZED HEALTH CARE EDUCATION/TRAINING PROGRAM

## 2023-07-10 PROCEDURE — 99215 PR OFFICE/OUTPT VISIT, EST, LEVL V, 40-54 MIN: ICD-10-PCS | Mod: HCNC,S$GLB,, | Performed by: STUDENT IN AN ORGANIZED HEALTH CARE EDUCATION/TRAINING PROGRAM

## 2023-07-10 PROCEDURE — 99999 PR PBB SHADOW E&M-EST. PATIENT-LVL IV: ICD-10-PCS | Mod: PBBFAC,HCNC,, | Performed by: STUDENT IN AN ORGANIZED HEALTH CARE EDUCATION/TRAINING PROGRAM

## 2023-07-10 PROCEDURE — 3288F PR FALLS RISK ASSESSMENT DOCUMENTED: ICD-10-PCS | Mod: HCNC,CPTII,S$GLB, | Performed by: STUDENT IN AN ORGANIZED HEALTH CARE EDUCATION/TRAINING PROGRAM

## 2023-07-10 PROCEDURE — 3044F PR MOST RECENT HEMOGLOBIN A1C LEVEL <7.0%: ICD-10-PCS | Mod: HCNC,CPTII,S$GLB, | Performed by: STUDENT IN AN ORGANIZED HEALTH CARE EDUCATION/TRAINING PROGRAM

## 2023-07-10 PROCEDURE — 99999 PR PBB SHADOW E&M-EST. PATIENT-LVL IV: CPT | Mod: PBBFAC,HCNC,, | Performed by: STUDENT IN AN ORGANIZED HEALTH CARE EDUCATION/TRAINING PROGRAM

## 2023-07-10 PROCEDURE — 1160F PR REVIEW ALL MEDS BY PRESCRIBER/CLIN PHARMACIST DOCUMENTED: ICD-10-PCS | Mod: HCNC,CPTII,S$GLB, | Performed by: STUDENT IN AN ORGANIZED HEALTH CARE EDUCATION/TRAINING PROGRAM

## 2023-07-10 PROCEDURE — 3288F FALL RISK ASSESSMENT DOCD: CPT | Mod: HCNC,CPTII,S$GLB, | Performed by: STUDENT IN AN ORGANIZED HEALTH CARE EDUCATION/TRAINING PROGRAM

## 2023-07-10 PROCEDURE — 1126F PR PAIN SEVERITY QUANTIFIED, NO PAIN PRESENT: ICD-10-PCS | Mod: HCNC,CPTII,S$GLB, | Performed by: STUDENT IN AN ORGANIZED HEALTH CARE EDUCATION/TRAINING PROGRAM

## 2023-07-10 PROCEDURE — 3008F BODY MASS INDEX DOCD: CPT | Mod: HCNC,CPTII,S$GLB, | Performed by: STUDENT IN AN ORGANIZED HEALTH CARE EDUCATION/TRAINING PROGRAM

## 2023-07-10 PROCEDURE — 1159F MED LIST DOCD IN RCRD: CPT | Mod: HCNC,CPTII,S$GLB, | Performed by: STUDENT IN AN ORGANIZED HEALTH CARE EDUCATION/TRAINING PROGRAM

## 2023-07-10 PROCEDURE — 4010F PR ACE/ARB THEARPY RXD/TAKEN: ICD-10-PCS | Mod: HCNC,CPTII,S$GLB, | Performed by: STUDENT IN AN ORGANIZED HEALTH CARE EDUCATION/TRAINING PROGRAM

## 2023-07-10 RX ORDER — ALBUTEROL SULFATE 90 UG/1
2 AEROSOL, METERED RESPIRATORY (INHALATION) EVERY 6 HOURS PRN
Qty: 18 G | Refills: 5 | Status: SHIPPED | OUTPATIENT
Start: 2023-07-10 | End: 2024-07-09

## 2023-07-10 NOTE — PROGRESS NOTES
Subjective:       Patient ID: Jean Shaw Jr. is a 68 y.o. male.    Chief Complaint: Annual Exam      Active Problem List with Overview Notes    Diagnosis Date Noted    Aortic atherosclerosis 07/10/2023     Asa and statin   asymptomatic       Benign prostatic hyperplasia with nocturia 07/27/2022     Follows with urology   stable      Family history of prostate cancer 07/27/2022 2022 brother diagnosed at 66 yo  Follows with urology       Chronic pain of right knee 02/02/2022     voltaren gel controls pain      Nuclear sclerosis of right eye 11/09/2020    Prediabetes 01/03/2020     Well controlled with weight loss      Bilateral hearing loss 01/03/2020     - evaluated by Audiology and pending hearing aids      PCT (porphyria cutanea tarda) 08/28/2019     Managed per derm       Essential hypertension 08/02/2018     Well controlled  Lisinopril/HCTZ 20/25  Asymptomatic       Class 2 severe obesity due to excess calories with serious comorbidity and body mass index (BMI) of 35.0 to 35.9 in adult 07/11/2018     Has lost > 20 pounds  More active and eating better  Feels a lot better and able to do more  Wants to continue with this change      Non-rheumatic tricuspid valve insufficiency 04/11/2017     Mild tricuspid regurgitation. 08/2016. Echo   Follows with cards  asymptomatic       Allergic rhinitis 01/29/2015    Coronary artery disease involving native coronary artery of native heart without angina pectoris 12/03/2014     Follows with cards  Statin and asa; max med therapy  Not able to tolerate BB 2/2 low heart rate       Pure hypercholesterolemia 10/14/2014     Well controlled   Lipitor 80   Well tolerated       Mild intermittent asthma without complication 10/14/2014     PRN albuterol   only has issues with colds       GERD (gastroesophageal reflux disease) 10/14/2014     PPI PRN   Advised of long term use harm vs good  Vit D and Ca advised           Review of Systems   All other systems reviewed and are  negative.     A1C:  Recent Labs   Lab 01/14/22  0703 07/22/22  0726 07/03/23  0658   Hemoglobin A1C 6.0 H 5.9 H 5.4     CBC:  Recent Labs   Lab 07/16/21  0801 07/22/22  0726 07/03/23  0658   WBC 7.50 7.83 8.40   RBC 4.86 4.79 4.81   Hemoglobin 14.6 14.2 14.4   Hematocrit 43.8 42.1 42.8   Platelets 264 235 221   MCV 90 88 89   MCH 30.0 29.6 29.9   MCHC 33.3 33.7 33.6     CMP:  Recent Labs   Lab 01/14/22  0703 07/22/22  0726 07/03/23  0658   Glucose 121 H 118 H 113 H   Calcium 9.8 9.2 9.2   Albumin 4.7 4.2 4.3   Total Protein 7.8 7.5 7.3   Sodium 139 139 135 L   Potassium 4.3 4.2 4.1   CO2 29 29 24   Chloride 99 99 98   BUN 15 13 15   Creatinine 0.77 0.69 0.71   Alkaline Phosphatase 101 101 90   ALT 39 39 32   AST 37 37 30   Total Bilirubin 0.6 0.5 0.4     LIPIDS:  Recent Labs   Lab 07/16/21  0801 07/22/22  0726 07/03/23  0658   TSH  --   --  2.360   HDL 36 L 42 45   Cholesterol 119 L 127 136   Triglycerides 70 65 78   LDL Cholesterol 69.0 72.0 75.4   HDL/Cholesterol Ratio 30.3 33.1 33.1   Non-HDL Cholesterol 83 85 91   Total Cholesterol/HDL Ratio 3.3 3.0 3.0     TSH:  Recent Labs   Lab 07/03/23  0658   TSH 2.360        Objective:      Vitals:    07/10/23 1023   BP: 121/72   Pulse: 60   Temp: 98.2 °F (36.8 °C)      Physical Exam  Vitals reviewed.   Constitutional:       Appearance: Normal appearance. He is obese.   HENT:      Head: Normocephalic and atraumatic.   Eyes:      Conjunctiva/sclera: Conjunctivae normal.   Cardiovascular:      Rate and Rhythm: Normal rate and regular rhythm.      Heart sounds: Normal heart sounds.   Pulmonary:      Effort: Pulmonary effort is normal.      Breath sounds: Normal breath sounds.   Abdominal:      Palpations: Abdomen is soft.      Tenderness: There is no abdominal tenderness.   Musculoskeletal:         General: Normal range of motion.      Cervical back: Normal range of motion.      Right lower leg: No edema.      Left lower leg: No edema.   Neurological:      General: No focal  deficit present.      Mental Status: He is alert and oriented to person, place, and time.   Psychiatric:         Mood and Affect: Mood normal.         Behavior: Behavior normal.        Assessment:       1. Encounter to establish care with new doctor    2. Mild intermittent asthma with acute exacerbation    3. Non-rheumatic tricuspid valve insufficiency    4. Pure hypercholesterolemia    5. Aortic atherosclerosis    6. Class 2 severe obesity due to excess calories with serious comorbidity and body mass index (BMI) of 35.0 to 35.9 in adult    7. PCT (porphyria cutanea tarda)    8. Mild intermittent asthma without complication    9. Coronary artery disease involving native coronary artery of native heart without angina pectoris    10. Essential hypertension    11. Benign prostatic hyperplasia with nocturia    12. Family history of prostate cancer    13. Prediabetes    14. Gastroesophageal reflux disease without esophagitis    15. Chronic pain of right knee        Plan:   1. Encounter to establish care with new doctor    2. Mild intermittent asthma with acute exacerbation  - albuterol (PROVENTIL/VENTOLIN HFA) 90 mcg/actuation inhaler; Inhale 2 puffs into the lungs every 6 (six) hours as needed for Wheezing.  Dispense: 18 g; Refill: 5    3. Non-rheumatic tricuspid valve insufficiency  - Ambulatory referral/consult to Cardiology; Future    4. Pure hypercholesterolemia  - Ambulatory referral/consult to Cardiology; Future    5. Aortic atherosclerosis    6. Class 2 severe obesity due to excess calories with serious comorbidity and body mass index (BMI) of 35.0 to 35.9 in adult    7. PCT (porphyria cutanea tarda)    8. Mild intermittent asthma without complication    9. Coronary artery disease involving native coronary artery of native heart without angina pectoris    10. Essential hypertension    11. Benign prostatic hyperplasia with nocturia    12. Family history of prostate cancer    13. Prediabetes    14. Gastroesophageal  reflux disease without esophagitis    15. Chronic pain of right knee     Establish care  Labs reviewed in detail  HM discussed  UTD  Continue healthy lifestyle efforts  Continue current meds as prescribed otherwise; refills per request  Keep routine specialist f/u   Refer to cards to establish since his is leaving and she feels its time for his annual; may need new echo  RTC in 1 year with labs prior and/or PRN          Sarah A. Champagne [facility]   7/10/23

## 2023-07-11 ENCOUNTER — OFFICE VISIT (OUTPATIENT)
Dept: CARDIOLOGY | Facility: CLINIC | Age: 68
End: 2023-07-11
Payer: MEDICARE

## 2023-07-11 VITALS
SYSTOLIC BLOOD PRESSURE: 113 MMHG | OXYGEN SATURATION: 96 % | HEIGHT: 69 IN | BODY MASS INDEX: 30.08 KG/M2 | DIASTOLIC BLOOD PRESSURE: 73 MMHG | WEIGHT: 203.06 LBS | HEART RATE: 80 BPM

## 2023-07-11 DIAGNOSIS — I10 ESSENTIAL HYPERTENSION: ICD-10-CM

## 2023-07-11 DIAGNOSIS — E78.00 PURE HYPERCHOLESTEROLEMIA: ICD-10-CM

## 2023-07-11 DIAGNOSIS — I25.10 CORONARY ARTERY DISEASE INVOLVING NATIVE CORONARY ARTERY OF NATIVE HEART WITHOUT ANGINA PECTORIS: Primary | ICD-10-CM

## 2023-07-11 DIAGNOSIS — E66.01 CLASS 2 SEVERE OBESITY DUE TO EXCESS CALORIES WITH SERIOUS COMORBIDITY AND BODY MASS INDEX (BMI) OF 35.0 TO 35.9 IN ADULT: ICD-10-CM

## 2023-07-11 DIAGNOSIS — I36.1 NON-RHEUMATIC TRICUSPID VALVE INSUFFICIENCY: ICD-10-CM

## 2023-07-11 DIAGNOSIS — I70.0 AORTIC ATHEROSCLEROSIS: ICD-10-CM

## 2023-07-11 DIAGNOSIS — K21.00 GASTROESOPHAGEAL REFLUX DISEASE WITH ESOPHAGITIS WITHOUT HEMORRHAGE: Chronic | ICD-10-CM

## 2023-07-11 PROCEDURE — 1126F AMNT PAIN NOTED NONE PRSNT: CPT | Mod: HCNC,CPTII,S$GLB, | Performed by: INTERNAL MEDICINE

## 2023-07-11 PROCEDURE — 1160F RVW MEDS BY RX/DR IN RCRD: CPT | Mod: HCNC,CPTII,S$GLB, | Performed by: INTERNAL MEDICINE

## 2023-07-11 PROCEDURE — 4010F ACE/ARB THERAPY RXD/TAKEN: CPT | Mod: HCNC,CPTII,S$GLB, | Performed by: INTERNAL MEDICINE

## 2023-07-11 PROCEDURE — 3008F BODY MASS INDEX DOCD: CPT | Mod: HCNC,CPTII,S$GLB, | Performed by: INTERNAL MEDICINE

## 2023-07-11 PROCEDURE — 3008F PR BODY MASS INDEX (BMI) DOCUMENTED: ICD-10-PCS | Mod: HCNC,CPTII,S$GLB, | Performed by: INTERNAL MEDICINE

## 2023-07-11 PROCEDURE — 99999 PR PBB SHADOW E&M-EST. PATIENT-LVL III: CPT | Mod: PBBFAC,HCNC,, | Performed by: INTERNAL MEDICINE

## 2023-07-11 PROCEDURE — 3044F PR MOST RECENT HEMOGLOBIN A1C LEVEL <7.0%: ICD-10-PCS | Mod: HCNC,CPTII,S$GLB, | Performed by: INTERNAL MEDICINE

## 2023-07-11 PROCEDURE — 99213 OFFICE O/P EST LOW 20 MIN: CPT | Mod: HCNC,S$GLB,, | Performed by: INTERNAL MEDICINE

## 2023-07-11 PROCEDURE — 1159F PR MEDICATION LIST DOCUMENTED IN MEDICAL RECORD: ICD-10-PCS | Mod: HCNC,CPTII,S$GLB, | Performed by: INTERNAL MEDICINE

## 2023-07-11 PROCEDURE — 99213 PR OFFICE/OUTPT VISIT, EST, LEVL III, 20-29 MIN: ICD-10-PCS | Mod: HCNC,S$GLB,, | Performed by: INTERNAL MEDICINE

## 2023-07-11 PROCEDURE — 4010F PR ACE/ARB THEARPY RXD/TAKEN: ICD-10-PCS | Mod: HCNC,CPTII,S$GLB, | Performed by: INTERNAL MEDICINE

## 2023-07-11 PROCEDURE — 3078F PR MOST RECENT DIASTOLIC BLOOD PRESSURE < 80 MM HG: ICD-10-PCS | Mod: HCNC,CPTII,S$GLB, | Performed by: INTERNAL MEDICINE

## 2023-07-11 PROCEDURE — 1126F PR PAIN SEVERITY QUANTIFIED, NO PAIN PRESENT: ICD-10-PCS | Mod: HCNC,CPTII,S$GLB, | Performed by: INTERNAL MEDICINE

## 2023-07-11 PROCEDURE — 1159F MED LIST DOCD IN RCRD: CPT | Mod: HCNC,CPTII,S$GLB, | Performed by: INTERNAL MEDICINE

## 2023-07-11 PROCEDURE — 3078F DIAST BP <80 MM HG: CPT | Mod: HCNC,CPTII,S$GLB, | Performed by: INTERNAL MEDICINE

## 2023-07-11 PROCEDURE — 99999 PR PBB SHADOW E&M-EST. PATIENT-LVL III: ICD-10-PCS | Mod: PBBFAC,HCNC,, | Performed by: INTERNAL MEDICINE

## 2023-07-11 PROCEDURE — 1160F PR REVIEW ALL MEDS BY PRESCRIBER/CLIN PHARMACIST DOCUMENTED: ICD-10-PCS | Mod: HCNC,CPTII,S$GLB, | Performed by: INTERNAL MEDICINE

## 2023-07-11 PROCEDURE — 3074F SYST BP LT 130 MM HG: CPT | Mod: HCNC,CPTII,S$GLB, | Performed by: INTERNAL MEDICINE

## 2023-07-11 PROCEDURE — 3074F PR MOST RECENT SYSTOLIC BLOOD PRESSURE < 130 MM HG: ICD-10-PCS | Mod: HCNC,CPTII,S$GLB, | Performed by: INTERNAL MEDICINE

## 2023-07-11 PROCEDURE — 3044F HG A1C LEVEL LT 7.0%: CPT | Mod: HCNC,CPTII,S$GLB, | Performed by: INTERNAL MEDICINE

## 2023-07-11 NOTE — PROGRESS NOTES
Subjective:   Patient ID:  Jean Shaw Jr. is a 68 y.o. male who presents for evaluation of Coronary Artery Disease      HPI    69 y/o male who presents for evaluation. He has a hx of CAD (non obstructive per [facility] 2014 for abnormal stress test), HTN, HLD, obesity, GERD. No cardiac symptoms at full activities. Denies CP, SOB/SU, orthopnea, PND, syncope, palps, LE edema. Compliant with meds. Stays active and lost weight.     Review of Systems   Constitutional: Negative for malaise/fatigue.   HENT:  Negative for congestion.    Eyes:  Negative for blurred vision.   Cardiovascular:  Negative for chest pain, claudication, cyanosis, dyspnea on exertion, irregular heartbeat, leg swelling, near-syncope, orthopnea, palpitations, paroxysmal nocturnal dyspnea and syncope.   Respiratory:  Negative for shortness of breath.    Endocrine: Negative for polyuria.   Hematologic/Lymphatic: Negative for bleeding problem.   Skin:  Negative for itching and rash.   Musculoskeletal:  Negative for joint swelling, muscle cramps and muscle weakness.   Gastrointestinal:  Negative for abdominal pain, hematemesis, hematochezia, melena, nausea and vomiting.   Genitourinary:  Negative for dysuria and hematuria.   Neurological:  Negative for dizziness, focal weakness, headaches, light-headedness, loss of balance and weakness.   Psychiatric/Behavioral:  Negative for depression. The patient is not nervous/anxious.      Objective:   Physical Exam  Constitutional:       Appearance: He is well-developed.   HENT:      Head: Normocephalic and atraumatic.   Neck:      Vascular: No JVD.   Cardiovascular:      Rate and Rhythm: Normal rate and regular rhythm.      Pulses:           Carotid pulses are 2+ on the right side and 2+ on the left side.       Radial pulses are 2+ on the right side and 2+ on the left side.        Femoral pulses are 2+ on the right side and 2+ on the left side.       Posterior tibial pulses are 1+ on the right side and 1+ on the left  side.      Heart sounds: Normal heart sounds.   Pulmonary:      Effort: Pulmonary effort is normal.      Breath sounds: Normal breath sounds.   Abdominal:      General: Bowel sounds are normal.      Palpations: Abdomen is soft.   Musculoskeletal:      Cervical back: Neck supple.   Skin:     General: Skin is warm and dry.   Neurological:      Mental Status: He is alert and oriented to person, place, and time.   Psychiatric:         Behavior: Behavior normal.         Thought Content: Thought content normal.       Assessment:      1. Coronary artery disease involving native coronary artery of native heart without angina pectoris    2. Non-rheumatic tricuspid valve insufficiency    3. Pure hypercholesterolemia    4. Essential hypertension    5. Aortic atherosclerosis    6. Class 2 severe obesity due to excess calories with serious comorbidity and body mass index (BMI) of 35.0 to 35.9 in adult    7. Gastroesophageal reflux disease with esophagitis without hemorrhage      67 y/o pt with hx and presentation as above. Doing well from a cardiac perspective and compensated from a HF perspective. Needs to stay active. Discussed the etiology, evaluation, and management of CAD, HTN, HLD, obesity, GERD. Discussed the importance of med compliance, heart healthy diet, and regular exercise.     Plan:     -Continue current medical management  -f/u in 1 year

## 2023-08-02 ENCOUNTER — OFFICE VISIT (OUTPATIENT)
Dept: UROLOGY | Facility: CLINIC | Age: 68
End: 2023-08-02
Payer: MEDICARE

## 2023-08-02 VITALS
BODY MASS INDEX: 34.98 KG/M2 | SYSTOLIC BLOOD PRESSURE: 125 MMHG | WEIGHT: 236.19 LBS | DIASTOLIC BLOOD PRESSURE: 74 MMHG | HEIGHT: 69 IN | HEART RATE: 52 BPM

## 2023-08-02 DIAGNOSIS — N40.1 BENIGN PROSTATIC HYPERPLASIA WITH NOCTURIA: Primary | ICD-10-CM

## 2023-08-02 DIAGNOSIS — R35.1 BENIGN PROSTATIC HYPERPLASIA WITH NOCTURIA: Primary | ICD-10-CM

## 2023-08-02 DIAGNOSIS — Z80.42 FAMILY HISTORY OF PROSTATE CANCER: ICD-10-CM

## 2023-08-02 PROCEDURE — 99999 PR PBB SHADOW E&M-EST. PATIENT-LVL III: CPT | Mod: PBBFAC,HCNC,, | Performed by: UROLOGY

## 2023-08-02 PROCEDURE — 4010F ACE/ARB THERAPY RXD/TAKEN: CPT | Mod: HCNC,CPTII,S$GLB, | Performed by: UROLOGY

## 2023-08-02 PROCEDURE — 1159F MED LIST DOCD IN RCRD: CPT | Mod: HCNC,CPTII,S$GLB, | Performed by: UROLOGY

## 2023-08-02 PROCEDURE — 3008F BODY MASS INDEX DOCD: CPT | Mod: HCNC,CPTII,S$GLB, | Performed by: UROLOGY

## 2023-08-02 PROCEDURE — 99214 PR OFFICE/OUTPT VISIT, EST, LEVL IV, 30-39 MIN: ICD-10-PCS | Mod: HCNC,S$GLB,, | Performed by: UROLOGY

## 2023-08-02 PROCEDURE — 3008F PR BODY MASS INDEX (BMI) DOCUMENTED: ICD-10-PCS | Mod: HCNC,CPTII,S$GLB, | Performed by: UROLOGY

## 2023-08-02 PROCEDURE — 99214 OFFICE O/P EST MOD 30 MIN: CPT | Mod: HCNC,S$GLB,, | Performed by: UROLOGY

## 2023-08-02 PROCEDURE — 3074F PR MOST RECENT SYSTOLIC BLOOD PRESSURE < 130 MM HG: ICD-10-PCS | Mod: HCNC,CPTII,S$GLB, | Performed by: UROLOGY

## 2023-08-02 PROCEDURE — 3044F PR MOST RECENT HEMOGLOBIN A1C LEVEL <7.0%: ICD-10-PCS | Mod: HCNC,CPTII,S$GLB, | Performed by: UROLOGY

## 2023-08-02 PROCEDURE — 99999 PR PBB SHADOW E&M-EST. PATIENT-LVL III: ICD-10-PCS | Mod: PBBFAC,HCNC,, | Performed by: UROLOGY

## 2023-08-02 PROCEDURE — 1159F PR MEDICATION LIST DOCUMENTED IN MEDICAL RECORD: ICD-10-PCS | Mod: HCNC,CPTII,S$GLB, | Performed by: UROLOGY

## 2023-08-02 PROCEDURE — 3078F PR MOST RECENT DIASTOLIC BLOOD PRESSURE < 80 MM HG: ICD-10-PCS | Mod: HCNC,CPTII,S$GLB, | Performed by: UROLOGY

## 2023-08-02 PROCEDURE — 1160F RVW MEDS BY RX/DR IN RCRD: CPT | Mod: HCNC,CPTII,S$GLB, | Performed by: UROLOGY

## 2023-08-02 PROCEDURE — 3044F HG A1C LEVEL LT 7.0%: CPT | Mod: HCNC,CPTII,S$GLB, | Performed by: UROLOGY

## 2023-08-02 PROCEDURE — 1126F AMNT PAIN NOTED NONE PRSNT: CPT | Mod: HCNC,CPTII,S$GLB, | Performed by: UROLOGY

## 2023-08-02 PROCEDURE — 3078F DIAST BP <80 MM HG: CPT | Mod: HCNC,CPTII,S$GLB, | Performed by: UROLOGY

## 2023-08-02 PROCEDURE — 4010F PR ACE/ARB THEARPY RXD/TAKEN: ICD-10-PCS | Mod: HCNC,CPTII,S$GLB, | Performed by: UROLOGY

## 2023-08-02 PROCEDURE — 1160F PR REVIEW ALL MEDS BY PRESCRIBER/CLIN PHARMACIST DOCUMENTED: ICD-10-PCS | Mod: HCNC,CPTII,S$GLB, | Performed by: UROLOGY

## 2023-08-02 PROCEDURE — 1126F PR PAIN SEVERITY QUANTIFIED, NO PAIN PRESENT: ICD-10-PCS | Mod: HCNC,CPTII,S$GLB, | Performed by: UROLOGY

## 2023-08-02 PROCEDURE — 3074F SYST BP LT 130 MM HG: CPT | Mod: HCNC,CPTII,S$GLB, | Performed by: UROLOGY

## 2023-08-02 NOTE — PROGRESS NOTES
Subjective:      Patient ID: Jean Shaw Jr. is a 68 y.o. male.    Chief Complaint: Annual Exam and Benign Prostatic Hypertrophy    Mr. Shaw is 60-year-old gentleman with a history of slightly rising PSA and a family history of prostate cancer in his brother diagnosed proximally year and a half ago.  Patient was referred by his primary care physician for evaluation of this.  He was told to return in 1 year for free and total PSA and he presents today for that.  The patient is having no new issues or problems and no complaints.    Benign Prostatic Hypertrophy  This is a chronic problem. The problem is unchanged. Irritative symptoms include frequency (x 6-8) and nocturia. Irritative symptoms do not include urgency. Obstructive symptoms do not include dribbling, incomplete emptying, an intermittent stream, a slower stream, straining or a weak stream. Pertinent negatives include no chills, dysuria, genital pain, hematuria, hesitancy, nausea or vomiting. AUA score is 0-7. He is sexually active. The symptoms are aggravated by caffeine.   Review of Systems   Constitutional:  Negative for activity change, appetite change, chills, diaphoresis, fatigue, fever and unexpected weight change.   HENT:  Negative for congestion, hearing loss, sinus pressure and trouble swallowing.    Eyes:  Negative for photophobia, pain, discharge and visual disturbance.   Respiratory:  Negative for apnea, cough and shortness of breath.    Cardiovascular:  Negative for chest pain, palpitations and leg swelling.   Gastrointestinal:  Negative for abdominal distention, abdominal pain, anal bleeding, blood in stool, constipation, diarrhea, nausea, rectal pain and vomiting.   Endocrine: Negative for cold intolerance, heat intolerance, polydipsia, polyphagia and polyuria.   Genitourinary:  Positive for frequency (x 6-8) and nocturia. Negative for decreased urine volume, difficulty urinating, dysuria, enuresis, flank pain, genital sores, hematuria,  hesitancy, incomplete emptying, penile discharge, penile pain, penile swelling, scrotal swelling, testicular pain and urgency.   Musculoskeletal:  Negative for arthralgias, back pain and myalgias.   Skin:  Negative for color change, pallor, rash and wound.   Allergic/Immunologic: Negative for environmental allergies, food allergies and immunocompromised state.   Neurological:  Negative for dizziness, seizures, weakness and headaches.   Hematological:  Negative for adenopathy. Does not bruise/bleed easily.   Psychiatric/Behavioral: Negative.      Objective:     Physical Exam  Vitals and nursing note reviewed.   Constitutional:       Appearance: Normal appearance. He is well-developed.   HENT:      Head: Normocephalic.      Right Ear: External ear normal.      Left Ear: External ear normal.      Nose: Nose normal.      Mouth/Throat:      Pharynx: No oropharyngeal exudate or posterior oropharyngeal erythema.   Eyes:      Extraocular Movements: Extraocular movements intact.      Conjunctiva/sclera: Conjunctivae normal.      Pupils: Pupils are equal, round, and reactive to light.   Cardiovascular:      Rate and Rhythm: Normal rate and regular rhythm.      Heart sounds: Normal heart sounds.   Pulmonary:      Effort: Pulmonary effort is normal.      Breath sounds: Normal breath sounds.   Abdominal:      General: Bowel sounds are normal.      Palpations: Abdomen is soft.   Genitourinary:     Penis: Normal.       Testes: Normal.   Musculoskeletal:         General: Normal range of motion.      Cervical back: Normal range of motion and neck supple.   Skin:     General: Skin is warm and dry.   Neurological:      Mental Status: He is alert and oriented to person, place, and time.      Deep Tendon Reflexes: Reflexes are normal and symmetric.   Psychiatric:         Behavior: Behavior normal.         Thought Content: Thought content normal.         Judgment: Judgment normal.      Assessment:      1. Benign prostatic hyperplasia  with nocturia    2. Family history of prostate cancer      Plan:     Patient Instructions   Patient with history of rising PSA over the last several years and a family history of prostate cancer in his brother diagnosed proximally year and half ago was referred last year by Dr. Beth and is here for follow-up.  Will have patient get a PSA test (free and total) when he leaves the office today

## 2023-08-02 NOTE — PATIENT INSTRUCTIONS
Patient with history of rising PSA over the last several years and a family history of prostate cancer in his brother diagnosed proximally year and half ago was referred last year by Dr. Beth and is here for follow-up.  Will have patient get a PSA test (free and total) when he leaves the office today

## 2023-09-22 DIAGNOSIS — I10 ESSENTIAL HYPERTENSION: ICD-10-CM

## 2023-09-22 RX ORDER — LISINOPRIL AND HYDROCHLOROTHIAZIDE 20; 25 MG/1; MG/1
TABLET ORAL
Qty: 90 TABLET | Refills: 0 | OUTPATIENT
Start: 2023-09-22

## 2023-09-22 NOTE — TELEPHONE ENCOUNTER
Refill Decision Note   Jean Shaw  is requesting a refill authorization.  Brief Assessment and Rationale for Refill:  Quick Discontinue     Medication Therapy Plan: Pharmacy is requesting new scripts for the following medications without required information, (sig/ frequency/qty/etc)       Medication Reconciliation Completed: No   Comments: Pharmacies have been requesting medications for patients without required information, (sig, frequency, qty, etc.). In addition, requests are sent for medication(s) pt. are currently not taking, and medications patients have never taken.    We have spoken to the pharmacies about these request types and advised their teams previously that we are unable to assess these New Script requests and require all details for these requests. This is a known issue and has been reported.     No Care Gaps recommended.     Note composed:2:57 PM 09/22/2023

## 2023-09-22 NOTE — TELEPHONE ENCOUNTER
No care due was identified.  [facility] Embedded Care Due Messages. Reference number: [id number].   9/22/2023 2:09:02 PM CDT

## 2023-10-12 ENCOUNTER — PATIENT MESSAGE (OUTPATIENT)
Dept: RESPIRATORY THERAPY | Facility: HOSPITAL | Age: 68
End: 2023-10-12
Payer: MEDICARE

## 2023-10-20 DIAGNOSIS — E78.00 PURE HYPERCHOLESTEROLEMIA: ICD-10-CM

## 2023-10-20 RX ORDER — ATORVASTATIN CALCIUM 80 MG/1
80 TABLET, FILM COATED ORAL NIGHTLY
Qty: 90 TABLET | Refills: 2 | Status: SHIPPED | OUTPATIENT
Start: 2023-10-20

## 2023-10-20 NOTE — TELEPHONE ENCOUNTER
No care due was identified.  [facility] Embedded Care Due Messages. Reference number: [id number].   10/20/2023 2:39:57 PM CDT

## 2023-10-20 NOTE — TELEPHONE ENCOUNTER
Refill Decision Note   Jean Shaw  is requesting a refill authorization.  Brief Assessment and Rationale for Refill:  Approve     Medication Therapy Plan:         Comments:     Note composed:3:13 PM 10/20/2023

## 2023-11-17 DIAGNOSIS — I10 ESSENTIAL HYPERTENSION: ICD-10-CM

## 2023-11-17 DIAGNOSIS — K21.9 GASTROESOPHAGEAL REFLUX DISEASE WITHOUT ESOPHAGITIS: Chronic | ICD-10-CM

## 2023-11-18 NOTE — TELEPHONE ENCOUNTER
No care due was identified.  [facility] Embedded Care Due Messages. Reference number: [id number].   11/17/2023 7:19:43 PM CST

## 2023-11-19 NOTE — TELEPHONE ENCOUNTER
Refill Routing Note   Medication(s) are not appropriate for processing by [facility] for the following reason(s):        No active prescription written by provider    [facility] action(s):  Defer               Appointments  past 12m or future 3m with PCP    Date Provider   Last Visit   7/10/2023 Analy Meza,    Next Visit   Visit date not found Analy Meza,    ED visits in past 90 days: 0        Note composed:6:23 PM 11/18/2023

## 2023-11-20 RX ORDER — OMEPRAZOLE 20 MG/1
CAPSULE, DELAYED RELEASE ORAL
Qty: 90 CAPSULE | Refills: 2 | Status: SHIPPED | OUTPATIENT
Start: 2023-11-20

## 2023-11-20 RX ORDER — LISINOPRIL AND HYDROCHLOROTHIAZIDE 20; 25 MG/1; MG/1
TABLET ORAL
Qty: 90 TABLET | Refills: 2 | Status: SHIPPED | OUTPATIENT
Start: 2023-11-20 | End: 2024-02-22 | Stop reason: SDUPTHER

## 2024-02-21 NOTE — PROGRESS NOTES
"  Jean Shaw presented for a  Medicare AWV and comprehensive Health Risk Assessment today. The following components were reviewed and updated:    Medical history  Family History  Social history  Allergies and Current Medications  Health Risk Assessment  Health Maintenance  Care Team         ** See Completed Assessments for Annual Wellness Visit within the encounter summary.**         The following assessments were completed:  Living Situation  CAGE  Depression Screening  Timed Get Up and Go  Whisper Test  Cognitive Function Screening - declined   Nutrition Screening  ADL Screening  PAQ Screening      Review for Opioid Screening: Pt does not have Rx for Opioids      Review for Substance Use Disorders: Patient does not use substance        Current Outpatient Medications:     albuterol (PROVENTIL/VENTOLIN HFA) 90 mcg/actuation inhaler, Inhale 2 puffs into the lungs every 6 (six) hours as needed for Wheezing., Disp: 18 g, Rfl: 5    aspirin 81 MG Chew, Take 1 tablet (81 mg total) by mouth once daily., Disp: 90 tablet, Rfl: 11    atorvastatin (LIPITOR) 80 MG tablet, TAKE 1 TABLET EVERY EVENING, Disp: 90 tablet, Rfl: 2    diclofenac sodium (VOLTAREN) 1 % Gel, Apply 2 g topically 4 (four) times daily as needed (knee pain)., Disp: 350 g, Rfl: 11    ibuprofen (ADVIL,MOTRIN) 800 MG tablet, Take 1 tablet (800 mg total) by mouth 3 (three) times daily., Disp: 90 tablet, Rfl: 1    omeprazole (PRILOSEC) 20 MG capsule, TAKE 1 CAPSULE EVERY DAY, Disp: 90 capsule, Rfl: 2    triamcinolone (NASACORT) 55 mcg nasal inhaler, 2 sprays by Nasal route 2 (two) times daily., Disp: , Rfl:     lisinopriL-hydrochlorothiazide (PRINZIDE,ZESTORETIC) 20-25 mg Tab, Take 1 tablet by mouth once daily., Disp: 90 tablet, Rfl: 2       Vitals:    02/22/24 1608   BP: 128/66   Pulse: 66   SpO2: 98%   Weight: 104 kg (229 lb 4.5 oz)   Height: 5' 9" (1.753 m)   PainSc: 0-No pain      Physical Exam  Vitals and nursing note reviewed.   Constitutional:       " General: He is not in acute distress.     Appearance: Normal appearance. He is not ill-appearing.   HENT:      Head: Normocephalic and atraumatic.      Ears:      Comments: Hearing impairment      Nose: Congestion (chronic) present.      Mouth/Throat:      Mouth: Mucous membranes are moist.   Eyes:      General: No scleral icterus.        Right eye: No discharge.         Left eye: No discharge.      Extraocular Movements: Extraocular movements intact.      Conjunctiva/sclera: Conjunctivae normal.   Cardiovascular:      Rate and Rhythm: Normal rate.   Pulmonary:      Effort: Pulmonary effort is normal. No respiratory distress.   Musculoskeletal:      Cervical back: Normal range of motion.   Skin:     General: Skin is warm and dry.      Findings: No rash.   Neurological:      Mental Status: He is alert and oriented to person, place, and time.   Psychiatric:         Mood and Affect: Mood normal.         Behavior: Behavior normal. Behavior is cooperative.         Cognition and Memory: Cognition normal.             Diagnoses and health risks identified today and associated recommendations/orders:    1. Encounter for preventive health examination  - Chart reviewed. Problem list updated. Discussed current medical diagnosis, current medications, medical/surgical/family/social history; updated provider list; documented vital signs; identified any cognitive impairment; and updated risk factor list. Addressed any outstanding health maintenance. Provided patient with personalized health advice. Continue to follow up with PCP and any specialists.     2. Aortic atherosclerosis  Chronic; stable on current treatment plan; follow up with PCP  - taking statin     3. PCT (porphyria cutanea tarda)  Chronic; stable on current treatment plan; follow up with PCP    4. Coronary artery disease involving native coronary artery of native heart without angina pectoris  Chronic; stable on current treatment plan; follow up with PCP  - taking  statin     5. Essential hypertension  Chronic; stable on current treatment plan; follow up with PCP  - taking lisinopril-HCTZ  - lisinopriL-hydrochlorothiazide (PRINZIDE,ZESTORETIC) 20-25 mg Tab; Take 1 tablet by mouth once daily.  Dispense: 90 tablet; Refill: 2    6. Benign prostatic hyperplasia with nocturia  Chronic; stable on current treatment plan; follow up with PCP  - follow up with Urology     7. Mild intermittent asthma without complication  Chronic; stable on current treatment plan; follow up with PCP  - taking albuterol PRN    8. Bilateral hearing loss, unspecified hearing loss type  Chronic; stable on current treatment plan; follow up with PCP  - has hearing aids     9. Pure hypercholesterolemia  Chronic; stable on current treatment plan; follow up with PCP  - taking statin     10. Prediabetes  Chronic; stable on current treatment plan; follow up with PCP    11. Gastroesophageal reflux disease without esophagitis  Chronic; stable on current treatment plan; follow up with PCP  - taking ppi    12. Chronic nasal congestion  - ongoing chronic complaint for months; patient using triamcinolone nasal spray  - reports hx nasal polyps; refer to ENT  - Ambulatory referral/consult to ENT; Future    13. Screening for colon cancer  - due for colon cancer screening, order placed  - Cologuard Screening (Multitarget Stool DNA); Future  - Cologuard Screening (Multitarget Stool DNA)    14. BMI 33.0-33.9,adult  - Recommendation for healthy diet and increasing exercise as tolerated with goal of 150min/week . Recommend weight loss        Provided Jean with a 5-10 year written screening schedule and personal prevention plan. Recommendations were developed using the USPSTF age appropriate recommendations. Education, counseling, and referrals were provided as needed. After Visit Summary printed and given to patient which includes a list of additional screenings\tests needed.    Follow up in about 1 year (around 2/22/2025) for  your next annual wellness visit.    Chaya Way, FNP-C    Advance Care Planning     I offered to discuss advanced care planning, including how to pick a person who would make decisions for you if you were unable to make them for yourself, called a health care power of , and what kind of decisions you might make such as use of life sustaining treatments such as ventilators and tube feeding when faced with a life limiting illness recorded on a living will that they will need to know. (How you want to be cared for as you near the end of your natural life)     X  Patient is unwilling to engage in a discussion regarding advance directives at this time.

## 2024-02-22 ENCOUNTER — OFFICE VISIT (OUTPATIENT)
Dept: FAMILY MEDICINE | Facility: CLINIC | Age: 69
End: 2024-02-22
Payer: MEDICARE

## 2024-02-22 VITALS
BODY MASS INDEX: 33.96 KG/M2 | HEART RATE: 66 BPM | HEIGHT: 69 IN | SYSTOLIC BLOOD PRESSURE: 128 MMHG | DIASTOLIC BLOOD PRESSURE: 66 MMHG | WEIGHT: 229.25 LBS | OXYGEN SATURATION: 98 %

## 2024-02-22 DIAGNOSIS — J45.20 MILD INTERMITTENT ASTHMA WITHOUT COMPLICATION: ICD-10-CM

## 2024-02-22 DIAGNOSIS — Z12.11 SCREENING FOR COLON CANCER: ICD-10-CM

## 2024-02-22 DIAGNOSIS — N40.1 BENIGN PROSTATIC HYPERPLASIA WITH NOCTURIA: ICD-10-CM

## 2024-02-22 DIAGNOSIS — I10 ESSENTIAL HYPERTENSION: ICD-10-CM

## 2024-02-22 DIAGNOSIS — I25.10 CORONARY ARTERY DISEASE INVOLVING NATIVE CORONARY ARTERY OF NATIVE HEART WITHOUT ANGINA PECTORIS: ICD-10-CM

## 2024-02-22 DIAGNOSIS — R73.03 PREDIABETES: ICD-10-CM

## 2024-02-22 DIAGNOSIS — Z00.00 ENCOUNTER FOR PREVENTIVE HEALTH EXAMINATION: Primary | ICD-10-CM

## 2024-02-22 DIAGNOSIS — R35.1 BENIGN PROSTATIC HYPERPLASIA WITH NOCTURIA: ICD-10-CM

## 2024-02-22 DIAGNOSIS — R09.81 CHRONIC NASAL CONGESTION: ICD-10-CM

## 2024-02-22 DIAGNOSIS — E78.00 PURE HYPERCHOLESTEROLEMIA: ICD-10-CM

## 2024-02-22 DIAGNOSIS — K21.9 GASTROESOPHAGEAL REFLUX DISEASE WITHOUT ESOPHAGITIS: Chronic | ICD-10-CM

## 2024-02-22 DIAGNOSIS — H91.93 BILATERAL HEARING LOSS, UNSPECIFIED HEARING LOSS TYPE: ICD-10-CM

## 2024-02-22 DIAGNOSIS — E80.1 PCT (PORPHYRIA CUTANEA TARDA): ICD-10-CM

## 2024-02-22 DIAGNOSIS — I70.0 AORTIC ATHEROSCLEROSIS: ICD-10-CM

## 2024-02-22 PROCEDURE — 99999 PR PBB SHADOW E&M-EST. PATIENT-LVL IV: CPT | Mod: PBBFAC,,, | Performed by: NURSE PRACTITIONER

## 2024-02-22 PROCEDURE — G0439 PPPS, SUBSEQ VISIT: HCPCS | Mod: S$GLB,,, | Performed by: NURSE PRACTITIONER

## 2024-02-22 RX ORDER — LISINOPRIL AND HYDROCHLOROTHIAZIDE 20; 25 MG/1; MG/1
1 TABLET ORAL DAILY
Qty: 90 TABLET | Refills: 2 | Status: SHIPPED | OUTPATIENT
Start: 2024-02-22

## 2024-02-22 NOTE — PATIENT INSTRUCTIONS
Counseling and Referral of Other Preventative  (Italic type indicates deductible and co-insurance are waived)    Patient Name: Jean Shaw  Today's Date: 2/22/2024    Health Maintenance       Date Due Completion Date    Colorectal Cancer Screening 04/29/2024 4/29/2023    RSV Vaccine (Age 60+ and Pregnant patients) (1 - 1-dose 60+ series) 02/23/2024 (Originally 1/16/2015) ---    Pneumococcal Vaccines (Age 65+) (3 of 3 - PPSV23 or PCV20) 02/28/2024 (Originally 7/11/2023) 7/9/2020    Hemoglobin A1c (Prediabetes) 07/03/2024 7/3/2023    Lipid Panel 07/03/2024 7/3/2023    PROSTATE-SPECIFIC ANTIGEN 08/02/2024 8/2/2023    High Dose Statin 02/22/2025 2/22/2024    Aspirin/Antiplatelet Therapy 02/22/2025 2/22/2024    TETANUS VACCINE 05/09/2026 5/9/2016        Orders Placed This Encounter   Procedures    Cologuard Screening (Multitarget Stool DNA)    Ambulatory referral/consult to ENT       The following information is provided to all patients.  This information is to help you find resources for any of the problems found today that may be affecting your health:                  Living healthy guide: www.[facility].[place].gov      Understanding Diabetes: www.diabetes.org      Eating healthy: www.cdc.gov/healthyweight      CDC home safety checklist: www.cdc.gov/steadi/patient.html      Agency on Aging: www.goea.[place].gov      Alcoholics anonymous (AA): www.aa.org      Physical Activity: www.ange.nih.gov/xg7rcek      Tobacco use: www.quitwithusla.org

## 2024-03-13 ENCOUNTER — DOCUMENTATION ONLY (OUTPATIENT)
Dept: OTOLARYNGOLOGY | Facility: CLINIC | Age: 69
End: 2024-03-13
Payer: MEDICARE

## 2024-03-13 ENCOUNTER — OFFICE VISIT (OUTPATIENT)
Dept: OTOLARYNGOLOGY | Facility: CLINIC | Age: 69
End: 2024-03-13
Payer: MEDICARE

## 2024-03-13 ENCOUNTER — HOSPITAL ENCOUNTER (OUTPATIENT)
Dept: RADIOLOGY | Facility: HOSPITAL | Age: 69
Discharge: HOME | End: 2024-03-13
Attending: OTOLARYNGOLOGY
Payer: MEDICARE

## 2024-03-13 ENCOUNTER — TELEPHONE (OUTPATIENT)
Dept: OTOLARYNGOLOGY | Facility: CLINIC | Age: 69
End: 2024-03-13

## 2024-03-13 ENCOUNTER — TELEPHONE (OUTPATIENT)
Dept: OTOLARYNGOLOGY | Facility: CLINIC | Age: 69
End: 2024-03-13
Payer: MEDICARE

## 2024-03-13 VITALS
WEIGHT: 230.19 LBS | BODY MASS INDEX: 33.99 KG/M2 | DIASTOLIC BLOOD PRESSURE: 82 MMHG | HEART RATE: 62 BPM | SYSTOLIC BLOOD PRESSURE: 156 MMHG

## 2024-03-13 DIAGNOSIS — R09.81 CHRONIC NASAL CONGESTION: ICD-10-CM

## 2024-03-13 DIAGNOSIS — J33.9 NASAL POLYPS: Primary | ICD-10-CM

## 2024-03-13 DIAGNOSIS — R43.0 ANOSMIA: ICD-10-CM

## 2024-03-13 DIAGNOSIS — J33.9 NASAL POLYPS: ICD-10-CM

## 2024-03-13 PROCEDURE — 70486 CT MAXILLOFACIAL W/O DYE: CPT | Mod: 26,,, | Performed by: RADIOLOGY

## 2024-03-13 PROCEDURE — 70486 CT MAXILLOFACIAL W/O DYE: CPT | Mod: TC

## 2024-03-13 PROCEDURE — 99999 PR PBB SHADOW E&M-EST. PATIENT-LVL III: CPT | Mod: PBBFAC,,, | Performed by: OTOLARYNGOLOGY

## 2024-03-13 PROCEDURE — 99204 OFFICE O/P NEW MOD 45 MIN: CPT | Mod: S$GLB,,, | Performed by: OTOLARYNGOLOGY

## 2024-03-13 NOTE — PROGRESS NOTES
His CT scans show evidence of extensive nasal polyposis affecting all of his paranasal sinuses.  He could benefit from Medtronic Fess addressing all of his paranasal sinuses with possible septoplasty and submucous resection of turbinates.

## 2024-03-13 NOTE — PROGRESS NOTES
Mr. Shaw     Vitals:    24 1101   BP: (!) 156/82   Pulse: 62       Chief Complaint:  Nose Problem (C/o nasal obstruction for years )       HPI:   is a 69-year-old white male who presents with complaints of sinus problems for years.  He is referred by  for evaluation.  He states that he has had nasal obstruction and congestion for the last 20+ years.  He also complains of anosmia.  He states that 18 years ago he was seen at [facility] and told that he had nasal polyps and surgery was recommended.  He did not follow-up with that.  He also has a history of asthma however he does take aspirin daily for his heart.    [id number] NOSE- 95    Review of Systems:  Constitutional:   weight loss or weight gain: Negative  Allergy/Immunologic:   Negative  Nasal Congestion/Obstruction:   Positive as above  Nosebleeds:   Negative  Sinus infections:   Positive for sinus infections  Headache/Facial Pain:   Negative  Snoring/BRADLEY:   Positive for snoring and sleep disturbance  Throat: Infections/Pain:   Negative  Hoarseness/Speech Disturbance:   Negative  Trauma Hx:  Negative    Cardiovascular:  M/I Angina:  Positive for coronary artery disease  Hypertension:  Positive  Endocrine:    DM/Steroids: Negative  GI:   Dysphagia/Reflux:  Positive history of GERD  :   GYN Pregnancy: Negative  Renal:   Dialysis: Negative  Lymphatic:   Neck Mass/Lymphadenopathy: Negative  Muscoloskeletal:   Negative  Hematologic:   Bleeding Disorders/Anemia: Negative  Neurologic:    Cranial/Neuralgia: Negative  Pulmonary:   Asthma/SOB/Cough:  Positive history of asthma  Skin Disorders: Negative    Past Medical/Surgical/Family/Social History:    ENT Surgery: Negative  Occupational Exposure: Negative   Problems: Negative  Cancer: Negative    Past Family History:   Family history of Cancer: Negative    Past Social History:   Tobacco: Nonsmoker   Alcohol:  Non Drinker      Allergies and medications: Reviewed per med  card.    Physical Examination:  Ears:   External auditory canals:  Bilateral cerumen impaction, positive use of bilateral hearing aids   Hearing: Grossly intact   Tympanic Membranes:  Unable to visualize  Nose:   External: Normal   Intranasal:  Bilateral complete obstruction due to nasal polyposis.  Unable to evaluate his nasal septum.  Mouth:   Intraorally: Lips, teeth, and gums:  Positive for upper plate.   Oropharynx: Normal   Mucosa: Normal   Tongue: Normal  Throat:      Palate: Normal palate with elevation   Tonsils:  Minimal   Posterior Pharynx: Normal  Fiberoptic exam: Not performed  Head/Face:     Inspection: Normal and atraumatic   Palpation/Percussion: Non tender   Facial strength: Normal and symmetric   Salivary glands: Normal  Neck: Supple  Thyroid: No masses  Lymphatics: No nodes  Respiratory:   Effort: Normal  Eyes:   Ocular Mobility: Normal   Vision: Grossly intact  Neuro/Psych:   Cranial Nerves: Grossly Intact   Orientation: Normal   Mood/Affect: Normal      Assessment/Plan:  I have discussed my findings with him in detail as well as my recommendations for treatment.  I will order [employer] CT scan of his sinuses to evaluate the extent of his intranasal polyposis.  I will also refer him to 1 of our ear specialist for his cerumen impaction.  He will contact us after his scans are completed to arrange for follow-up.

## 2024-03-13 NOTE — TELEPHONE ENCOUNTER
----- Message from [place] JOVAN Yanes III, MD sent at 3/13/2024  4:21 PM CDT -----  His CT scans show evidence of extensive nasal polyposis affecting all of his paranasal sinuses.  He could benefit from Medtronic Fess addressing all of his paranasal sinuses with possible septoplasty and submucous resection of turbinates.  He is certainly welcome to return to clinic for a visit with me to review his CT scans in person.

## 2024-03-13 NOTE — TELEPHONE ENCOUNTER
----- Message from Anthony Meyer MA sent at 3/13/2024  2:12 PM CDT -----  Regarding: FW: Pt called to schedule an appt to have his ears cleaned out and pt would like a call back today    ----- Message -----  From: Janel Laurent  Sent: 3/13/2024  11:50 AM CDT  To: Joaquín HO III Staff  Subject: Pt called to schedule an appt to have his ea#    Appointment Access Request:    Pt called to schedule an appt to have his ears cleaned out and pt would like a call back today    Pt can be reached at  [phone]

## 2024-03-27 ENCOUNTER — OFFICE VISIT (OUTPATIENT)
Dept: OTOLARYNGOLOGY | Facility: CLINIC | Age: 69
End: 2024-03-27
Payer: MEDICARE

## 2024-03-27 VITALS
DIASTOLIC BLOOD PRESSURE: 78 MMHG | SYSTOLIC BLOOD PRESSURE: 135 MMHG | BODY MASS INDEX: 35.65 KG/M2 | WEIGHT: 241.38 LBS | HEART RATE: 60 BPM

## 2024-03-27 DIAGNOSIS — J33.9 NASAL POLYPS: Primary | ICD-10-CM

## 2024-03-27 DIAGNOSIS — J34.2 NASAL SEPTAL DEVIATION: ICD-10-CM

## 2024-03-27 DIAGNOSIS — Z01.818 PRE-OP TESTING: ICD-10-CM

## 2024-03-27 DIAGNOSIS — R09.81 CHRONIC NASAL CONGESTION: ICD-10-CM

## 2024-03-27 PROCEDURE — 99999 PR PBB SHADOW E&M-EST. PATIENT-LVL III: CPT | Mod: PBBFAC,,, | Performed by: OTOLARYNGOLOGY

## 2024-03-27 PROCEDURE — 99213 OFFICE O/P EST LOW 20 MIN: CPT | Mod: S$GLB,,, | Performed by: OTOLARYNGOLOGY

## 2024-03-27 NOTE — PROGRESS NOTES
The patient presents to review CT scans. These show evidence of chronic recurrent sinus infections involving all of his sinuses as well as left-sided septal spur.  We have discussed Medtronic Fess, septoplasty, submucous resection of turbinates to address these issues.  I have discussed the pros and cons risks and benefits as well as technical aspects of this procedure in detail with him and he understands and accepts these and wishes to proceed with scheduling.

## 2024-04-16 ENCOUNTER — OFFICE VISIT (OUTPATIENT)
Dept: OTOLARYNGOLOGY | Facility: CLINIC | Age: 69
End: 2024-04-16
Payer: MEDICARE

## 2024-04-16 DIAGNOSIS — Z97.4 WEARS HEARING AID IN BOTH EARS: ICD-10-CM

## 2024-04-16 DIAGNOSIS — H61.23 BILATERAL IMPACTED CERUMEN: Primary | ICD-10-CM

## 2024-04-16 PROCEDURE — 99999 PR PBB SHADOW E&M-EST. PATIENT-LVL II: CPT | Mod: PBBFAC,,, | Performed by: NURSE PRACTITIONER

## 2024-04-16 PROCEDURE — 99499 UNLISTED E&M SERVICE: CPT | Mod: S$GLB,,, | Performed by: NURSE PRACTITIONER

## 2024-04-16 PROCEDURE — 69210 REMOVE IMPACTED EAR WAX UNI: CPT | Mod: S$GLB,,, | Performed by: NURSE PRACTITIONER

## 2024-04-16 NOTE — PROCEDURES
Ear Cerumen Removal    Date/Time: 4/16/2024 8:30 AM    Performed by: Lisa Manriquez NP  Authorized by: Lisa Manriquez NP      Local anesthetic:  None  Location details:  Both ears  Procedure type: curette    Cerumen  Removal Results:  Cerumen completely removed  Patient tolerance:  Patient tolerated the procedure well with no immediate complications     Procedure Note:    The patient was brought to the minor procedure room and placed under the operating microscope of the right ear canal which was cleaned of ceruminous debris. Using a combination of suction, curettes and cup forceps the patient's cerumen was removed. The patient tolerated the procedure well. There were no complications.    Procedure Note:    The patient was brought to the minor procedure room and placed under the operating microscope of the left ear canal which was cleaned of ceruminous debris. Using a combination of suction, curettes and cup forceps the patient's cerumen was removed.  The patient tolerated the procedure well. There were no complications.      Presents for ear cleaning after bilateral impactions were noted on exam per Dr. Yanes.     Has worn hearing aids for 2 years.There is not a prior history of ear surgery.  There is a history of ear trauma. 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     Has upcoming sinus surgery with Dr. Yanes. Recommend follow-up every year for routine cleaning.

## 2024-05-03 ENCOUNTER — TELEPHONE (OUTPATIENT)
Dept: OTOLARYNGOLOGY | Facility: CLINIC | Age: 69
End: 2024-05-03
Payer: MEDICARE

## 2024-07-12 ENCOUNTER — OFFICE VISIT (OUTPATIENT)
Dept: FAMILY MEDICINE | Facility: CLINIC | Age: 69
End: 2024-07-12
Payer: MEDICARE

## 2024-07-12 VITALS
BODY MASS INDEX: 34.09 KG/M2 | DIASTOLIC BLOOD PRESSURE: 76 MMHG | HEIGHT: 69 IN | SYSTOLIC BLOOD PRESSURE: 112 MMHG | OXYGEN SATURATION: 96 % | TEMPERATURE: 98 F | WEIGHT: 230.19 LBS | HEART RATE: 86 BPM

## 2024-07-12 DIAGNOSIS — E80.1 PCT (PORPHYRIA CUTANEA TARDA): ICD-10-CM

## 2024-07-12 DIAGNOSIS — I10 ESSENTIAL HYPERTENSION: ICD-10-CM

## 2024-07-12 DIAGNOSIS — J45.20 MILD INTERMITTENT ASTHMA WITHOUT COMPLICATION: Primary | ICD-10-CM

## 2024-07-12 DIAGNOSIS — I25.10 CORONARY ARTERY DISEASE INVOLVING NATIVE CORONARY ARTERY OF NATIVE HEART WITHOUT ANGINA PECTORIS: ICD-10-CM

## 2024-07-12 DIAGNOSIS — I70.0 AORTIC ATHEROSCLEROSIS: ICD-10-CM

## 2024-07-12 DIAGNOSIS — Z80.42 FAMILY HISTORY OF PROSTATE CANCER: ICD-10-CM

## 2024-07-12 DIAGNOSIS — R35.1 BENIGN PROSTATIC HYPERPLASIA WITH NOCTURIA: ICD-10-CM

## 2024-07-12 DIAGNOSIS — R73.03 PREDIABETES: ICD-10-CM

## 2024-07-12 DIAGNOSIS — N40.1 BENIGN PROSTATIC HYPERPLASIA WITH NOCTURIA: ICD-10-CM

## 2024-07-12 DIAGNOSIS — K21.9 GASTROESOPHAGEAL REFLUX DISEASE WITHOUT ESOPHAGITIS: Chronic | ICD-10-CM

## 2024-07-12 DIAGNOSIS — E78.00 PURE HYPERCHOLESTEROLEMIA: ICD-10-CM

## 2024-07-12 DIAGNOSIS — I36.1 NON-RHEUMATIC TRICUSPID VALVE INSUFFICIENCY: ICD-10-CM

## 2024-07-12 DIAGNOSIS — J45.21 MILD INTERMITTENT ASTHMA WITH ACUTE EXACERBATION: ICD-10-CM

## 2024-07-12 PROBLEM — E66.812 CLASS 2 SEVERE OBESITY DUE TO EXCESS CALORIES WITH SERIOUS COMORBIDITY AND BODY MASS INDEX (BMI) OF 35.0 TO 35.9 IN ADULT: Status: RESOLVED | Noted: 2018-07-11 | Resolved: 2024-07-12

## 2024-07-12 PROBLEM — E66.01 CLASS 2 SEVERE OBESITY DUE TO EXCESS CALORIES WITH SERIOUS COMORBIDITY AND BODY MASS INDEX (BMI) OF 35.0 TO 35.9 IN ADULT: Status: RESOLVED | Noted: 2018-07-11 | Resolved: 2024-07-12

## 2024-07-12 PROCEDURE — 99999 PR PBB SHADOW E&M-EST. PATIENT-LVL III: CPT | Mod: PBBFAC,,, | Performed by: STUDENT IN AN ORGANIZED HEALTH CARE EDUCATION/TRAINING PROGRAM

## 2024-07-12 RX ORDER — ALBUTEROL SULFATE 90 UG/1
2 AEROSOL, METERED RESPIRATORY (INHALATION) EVERY 6 HOURS PRN
Qty: 18 G | Refills: 3 | Status: SHIPPED | OUTPATIENT
Start: 2024-07-12 | End: 2025-07-12

## 2024-07-12 RX ORDER — LISINOPRIL AND HYDROCHLOROTHIAZIDE 20; 25 MG/1; MG/1
1 TABLET ORAL DAILY
Qty: 90 TABLET | Refills: 3 | Status: SHIPPED | OUTPATIENT
Start: 2024-07-12

## 2024-07-12 RX ORDER — OMEPRAZOLE 20 MG/1
20 CAPSULE, DELAYED RELEASE ORAL DAILY
Qty: 90 CAPSULE | Refills: 3 | Status: SHIPPED | OUTPATIENT
Start: 2024-07-12

## 2024-07-12 RX ORDER — ATORVASTATIN CALCIUM 80 MG/1
80 TABLET, FILM COATED ORAL NIGHTLY
Qty: 90 TABLET | Refills: 3 | Status: SHIPPED | OUTPATIENT
Start: 2024-07-12

## 2024-07-12 RX ORDER — NAPROXEN SODIUM 220 MG/1
81 TABLET, FILM COATED ORAL DAILY
Qty: 90 TABLET | Refills: 3 | Status: SHIPPED | OUTPATIENT
Start: 2024-07-12 | End: 2025-07-12

## 2024-07-12 RX ORDER — TRIAMCINOLONE ACETONIDE 55 UG/1
2 SPRAY, METERED NASAL 2 TIMES DAILY
Qty: 16.9 ML | Refills: 2 | Status: SHIPPED | OUTPATIENT
Start: 2024-07-12

## 2024-07-12 NOTE — PROGRESS NOTES
Subjective:       Patient ID: Jean Shaw Jr. is a 69 y.o. male.    Chief Complaint: Follow-up      Active Problem List with Overview Notes    Diagnosis Date Noted    Aortic atherosclerosis 07/10/2023     Asa and statin   asymptomatic       Benign prostatic hyperplasia with nocturia 07/27/2022     Follows with urology   stable      Family history of prostate cancer 07/27/2022 2022 brother diagnosed at 64 yo  Follows with urology       Chronic pain of right knee 02/02/2022     voltaren gel controls pain      Nuclear sclerosis of right eye 11/09/2020    Prediabetes 01/03/2020     Well controlled with weight loss      Bilateral hearing loss 01/03/2020     - evaluated by Audiology and pending hearing aids      PCT (porphyria cutanea tarda) 08/28/2019     Managed per derm       Essential hypertension 08/02/2018     Well controlled  Lisinopril/HCTZ 20/25  Asymptomatic       Non-rheumatic tricuspid valve insufficiency 04/11/2017     Mild tricuspid regurgitation. 08/2016. Echo   Follows with cards  asymptomatic       Coronary artery disease involving native coronary artery of native heart without angina pectoris 12/03/2014     Follows with cards  Statin and asa; max med therapy  Not able to tolerate BB 2/2 low heart rate       Pure hypercholesterolemia 10/14/2014     Well controlled   Lipitor 80   Of note LDL not to goal of under 70; may consider adding Zetia if continues to be elevated   Well tolerated       Mild intermittent asthma without complication 10/14/2014     PRN albuterol   only has issues with colds       GERD (gastroesophageal reflux disease) 10/14/2014     PPI PRN   Advised of long term use harm vs good  Vit D and Ca advised           Review of Systems   All other systems reviewed and are negative.       A1C:  Recent Labs   Lab 07/22/22  0726 07/03/23  0658 07/05/24  0719   Hemoglobin A1C 5.9 H 5.4 5.6     CBC:  Recent Labs   Lab 07/22/22  0726 07/03/23  0658 07/05/24  0719   WBC 7.83 8.40 8.69   RBC  4.79 4.81 4.88   Hemoglobin 14.2 14.4 14.7   Hematocrit 42.1 42.8 43.3   Platelets 235 221 225   MCV 88 89 89   MCH 29.6 29.9 30.1   MCHC 33.7 33.6 33.9     CMP:  Recent Labs   Lab 07/22/22 0726 07/03/23 0658 07/05/24  0719   Glucose 118 H 113 H 112 H   Calcium 9.2 9.2 9.6   Albumin 4.2 4.3 3.9   Total Protein 7.5 7.3 7.2   Sodium 139 135 L 136   Potassium 4.2 4.1 4.4   CO2 29 24 28   Chloride 99 98 100   BUN 13 15 16   Creatinine 0.69 0.71 0.8   Alkaline Phosphatase 101 90 83   ALT 39 32 27   AST 37 30 28   Total Bilirubin 0.5 0.4 0.6     LIPIDS:  Recent Labs   Lab 07/22/22  0726 07/22/22 0726 07/03/23 0658 07/05/24  0719   TSH  --    < > 2.360 1.858   HDL 42  --  45 44   Cholesterol 127  --  136 137   Triglycerides 65  --  78 62   LDL Cholesterol 72.0  --  75.4 80.6   HDL/Cholesterol Ratio 33.1  --  33.1 32.1   Non-HDL Cholesterol 85  --  91 93   Total Cholesterol/HDL Ratio 3.0  --  3.0 3.1    < > = values in this interval not displayed.     TSH:  Recent Labs   Lab 07/03/23 0658 07/05/24 0719   TSH 2.360 1.858        Objective:      Vitals:    07/12/24 1115   BP: 112/76   Pulse: 86   Temp: 98.1 °F (36.7 °C)      Physical Exam  Vitals reviewed.   Constitutional:       Appearance: Normal appearance. He is obese.   HENT:      Head: Normocephalic and atraumatic.   Eyes:      Conjunctiva/sclera: Conjunctivae normal.   Cardiovascular:      Rate and Rhythm: Normal rate and regular rhythm.      Heart sounds: Normal heart sounds.   Pulmonary:      Effort: Pulmonary effort is normal.      Breath sounds: Normal breath sounds.   Abdominal:      Palpations: Abdomen is soft.      Tenderness: There is no abdominal tenderness.   Musculoskeletal:         General: Normal range of motion.      Cervical back: Normal range of motion.      Right lower leg: No edema.      Left lower leg: No edema.   Neurological:      Mental Status: He is alert. Mental status is at baseline.   Psychiatric:         Mood and Affect: Mood normal.          Behavior: Behavior normal.          Assessment:       1. Mild intermittent asthma without complication    2. Mild intermittent asthma with acute exacerbation    3. Coronary artery disease involving native coronary artery of native heart without angina pectoris    4. Essential hypertension    5. Pure hypercholesterolemia    6. Gastroesophageal reflux disease without esophagitis    7. Non-rheumatic tricuspid valve insufficiency    8. Aortic atherosclerosis    9. Benign prostatic hyperplasia with nocturia    10. Family history of prostate cancer    11. Prediabetes    12. PCT (porphyria cutanea tarda)        Plan:   1. Mild intermittent asthma with acute exacerbation  - albuterol (PROVENTIL/VENTOLIN HFA) 90 mcg/actuation inhaler; Inhale 2 puffs into the lungs every 6 (six) hours as needed for Wheezing.  Dispense: 18 g; Refill: 3    2. Coronary artery disease involving native coronary artery of native heart without angina pectoris  - aspirin 81 MG Chew; Take 1 tablet (81 mg total) by mouth once daily.  Dispense: 90 tablet; Refill: 3  - Ambulatory referral/consult to Cardiology; Future    3. Essential hypertension  - aspirin 81 MG Chew; Take 1 tablet (81 mg total) by mouth once daily.  Dispense: 90 tablet; Refill: 3  - lisinopriL-hydrochlorothiazide (PRINZIDE,ZESTORETIC) 20-25 mg Tab; Take 1 tablet by mouth once daily.  Dispense: 90 tablet; Refill: 3    4. Pure hypercholesterolemia  - atorvastatin (LIPITOR) 80 MG tablet; Take 1 tablet (80 mg total) by mouth every evening.  Dispense: 90 tablet; Refill: 3    5. Gastroesophageal reflux disease without esophagitis  - omeprazole (PRILOSEC) 20 MG capsule; Take 1 capsule (20 mg total) by mouth once daily.  Dispense: 90 capsule; Refill: 3    6. Mild intermittent asthma without complication    7. Non-rheumatic tricuspid valve insufficiency    8. Aortic atherosclerosis    9. Benign prostatic hyperplasia with nocturia    10. Family history of prostate cancer    11.  Prediabetes    12. PCT (porphyria cutanea tarda)     Well male  Labs reviewed in detail  Consider adding zetia if LDL remains over goal of under 70  HM discussed  COMPLETE COLOGUARD!  Continue healthy lifestyle efforts  Continue current meds as prescribed otherwise; refills per request  Refer to cards per pt request  Keep routine specialist f/u   RTC in 1 year with labs prior and/or PRN          Analy Meza   [facility]   7/12/24     I spent a total of 41 minutes on the day of the visit.This includes face to face time and non-face to face time preparing to see the patient (eg, review of tests), obtaining and/or reviewing separately obtained history, documenting clinical information in the electronic or other health record, independently interpreting results and communicating results to the patient/family/caregiver, or care coordinator.

## 2024-07-24 ENCOUNTER — OFFICE VISIT (OUTPATIENT)
Dept: CARDIOLOGY | Facility: CLINIC | Age: 69
End: 2024-07-24
Payer: MEDICARE

## 2024-07-24 VITALS
WEIGHT: 236.13 LBS | HEIGHT: 69 IN | HEART RATE: 98 BPM | OXYGEN SATURATION: 98 % | SYSTOLIC BLOOD PRESSURE: 139 MMHG | DIASTOLIC BLOOD PRESSURE: 79 MMHG | BODY MASS INDEX: 34.97 KG/M2

## 2024-07-24 DIAGNOSIS — I25.10 CORONARY ARTERY DISEASE INVOLVING NATIVE CORONARY ARTERY OF NATIVE HEART WITHOUT ANGINA PECTORIS: ICD-10-CM

## 2024-07-24 DIAGNOSIS — R07.9 CHEST PAIN, UNSPECIFIED TYPE: Primary | ICD-10-CM

## 2024-07-24 PROCEDURE — 99999 PR PBB SHADOW E&M-EST. PATIENT-LVL III: CPT | Mod: PBBFAC,,, | Performed by: INTERNAL MEDICINE

## 2024-07-24 RX ORDER — EZETIMIBE 10 MG/1
10 TABLET ORAL DAILY
Qty: 90 TABLET | Refills: 3 | Status: SHIPPED | OUTPATIENT
Start: 2024-07-24 | End: 2025-07-24

## 2024-07-24 NOTE — PROGRESS NOTES
Subjective:   @Patient ID:  Jean Shaw Jr. is a 69 y.o. male who presents for follow-up of No chief complaint on file.      HPI:     Patient is a 69-year-old male with past medical history of coronary artery disease left heart catheterization done in 2014 for abnormal stress test noted to have 70% OM1 disease and 30% RCA disease that is medically managed, previous echocardiogram without any major abnormalities.  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  Able to cut grass without any difficulty with a push lawnmower.  Has had a couple of episodes of toothache type of chest pain that has radiated to the left arm in the past that did come up without much exertion.  Over the last 1 month has had good activity without any symptoms.  Does have mild shortness of breath which he attributes to age.      Has an upcoming sinus surgery for polyp removal on 23rd August 2024.  Euvolemic on examination.  LDL 80 A1c of 5.6 on recent blood workup this year.      Patient Active Problem List    Diagnosis Date Noted    Aortic atherosclerosis 07/10/2023     Asa and statin   asymptomatic       Benign prostatic hyperplasia with nocturia 07/27/2022     Follows with urology   stable      Family history of prostate cancer 07/27/2022 2022 brother diagnosed at 64 yo  Follows with urology       Chronic pain of right knee 02/02/2022     voltaren gel controls pain      Nuclear sclerosis of right eye 11/09/2020    Prediabetes 01/03/2020     Well controlled with weight loss      Bilateral hearing loss 01/03/2020     - evaluated by Audiology and pending hearing aids      PCT (porphyria cutanea tarda) 08/28/2019     Managed per derm       Essential hypertension 08/02/2018     Well controlled  Lisinopril/HCTZ 20/25  Asymptomatic       Non-rheumatic tricuspid valve insufficiency 04/11/2017     Mild tricuspid regurgitation. 08/2016. Echo   Follows with cards  asymptomatic       Coronary artery disease involving native coronary artery of  native heart without angina pectoris 12/03/2014     Follows with cards  Statin and asa; max med therapy  Not able to tolerate BB 2/2 low heart rate       Pure hypercholesterolemia 10/14/2014     Well controlled   Lipitor 80   Of note LDL not to goal of under 70; may consider adding Zetia if continues to be elevated   Well tolerated       Mild intermittent asthma without complication 10/14/2014     PRN albuterol   only has issues with colds       GERD (gastroesophageal reflux disease) 10/14/2014     PPI PRN   Advised of long term use harm vs good  Vit D and Ca advised                       LAST HbA1c  Lab Results   Component Value Date    HGBA1C 5.6 07/05/2024       Lipid panel  Lab Results   Component Value Date    CHOL 137 07/05/2024    CHOL 136 07/03/2023    CHOL 127 07/22/2022     Lab Results   Component Value Date    HDL 44 07/05/2024    HDL 45 07/03/2023    HDL 42 07/22/2022     Lab Results   Component Value Date    LDLCALC 80.6 07/05/2024    LDLCALC 75.4 07/03/2023    LDLCALC 72.0 07/22/2022     Lab Results   Component Value Date    TRIG 62 07/05/2024    TRIG 78 07/03/2023    TRIG 65 07/22/2022     Lab Results   Component Value Date    CHOLHDL 32.1 07/05/2024    CHOLHDL 33.1 07/03/2023    CHOLHDL 33.1 07/22/2022            Review of Systems   Constitutional: Negative for chills and fever.   HENT:  Negative for hearing loss and nosebleeds.    Eyes:  Negative for blurred vision.   Cardiovascular:  Negative for chest pain, leg swelling and palpitations.   Respiratory:  Positive for shortness of breath. Negative for hemoptysis.    Hematologic/Lymphatic: Negative for bleeding problem.   Skin:  Negative for itching.   Musculoskeletal:  Negative for falls.   Gastrointestinal:  Negative for abdominal pain and hematochezia.   Genitourinary:  Negative for hematuria.   Neurological:  Negative for dizziness and loss of balance.   Psychiatric/Behavioral:  Negative for altered mental status and depression.        Objective:    Physical Exam  Constitutional:       Appearance: He is well-developed.   HENT:      Head: Normocephalic and atraumatic.   Eyes:      Conjunctiva/sclera: Conjunctivae normal.   Neck:      Vascular: No carotid bruit or JVD.   Cardiovascular:      Rate and Rhythm: Normal rate and regular rhythm.      Pulses:           Carotid pulses are 2+ on the right side and 2+ on the left side.       Radial pulses are 2+ on the right side and 2+ on the left side.      Heart sounds: Murmur heard.      No friction rub. No gallop.   Pulmonary:      Effort: Pulmonary effort is normal. No respiratory distress.      Breath sounds: Normal breath sounds. No stridor. No wheezing.   Musculoskeletal:      Cervical back: Neck supple.   Skin:     General: Skin is warm and dry.   Neurological:      Mental Status: He is alert and oriented to person, place, and time.   Psychiatric:         Behavior: Behavior normal.         Assessment:     1. Chest pain, unspecified type    2. Coronary artery disease involving native coronary artery of native heart without angina pectoris        Plan:       Pertinent cardiac images and EKG reviewed independently.    Continue with current medical plan and lifestyle changes.  Return sooner for concerns or questions. If symptoms persist go to the ED  I have reviewed all pertinent data including patient's medical history in detail and updated the computerized patient record.     Orders Placed This Encounter   Procedures    NM Myocardial Perfusion Spect Multi Exer     Standing Status:   Future     Standing Expiration Date:   7/24/2025     Order Specific Question:   May the Radiologist modify the order per protocol to meet the clinical needs of the patient?     Answer:   Yes     Order Specific Question:   Will a Cardiologist read this study?     Answer:   No    Nuclear Stress Test     Standing Status:   Future     Standing Expiration Date:   7/24/2025     Order Specific Question:   Which stress agent will be used?      Answer:   Exercise     Order Specific Question:   Release to patient     Answer:   Immediate    Echo     Standing Status:   Future     Standing Expiration Date:   7/24/2025     Order Specific Question:   Release to patient     Answer:   Immediate       Follow up as scheduled.     He expressed verbal understanding and agreed with the plan    Patient's Medications   New Prescriptions    EZETIMIBE (ZETIA) 10 MG TABLET    Take 1 tablet (10 mg total) by mouth once daily.   Previous Medications    ALBUTEROL (PROVENTIL/VENTOLIN HFA) 90 MCG/ACTUATION INHALER    Inhale 2 puffs into the lungs every 6 (six) hours as needed for Wheezing.    ASPIRIN 81 MG CHEW    Take 1 tablet (81 mg total) by mouth once daily.    ATORVASTATIN (LIPITOR) 80 MG TABLET    Take 1 tablet (80 mg total) by mouth every evening.    LISINOPRIL-HYDROCHLOROTHIAZIDE (PRINZIDE,ZESTORETIC) 20-25 MG TAB    Take 1 tablet by mouth once daily.    OMEPRAZOLE (PRILOSEC) 20 MG CAPSULE    Take 1 capsule (20 mg total) by mouth once daily.    TRIAMCINOLONE (NASACORT) 55 MCG NASAL INHALER    2 sprays by Nasal route 2 (two) times daily.   Modified Medications    No medications on file   Discontinued Medications    No medications on file        -patient does have established coronary artery disease.  LDL at 80.  Goal less than 70.  On maximum Lipitor 80 mg daily.  Start Zetia 10 mg daily  - continue aspirin 81 mg daily.  May hold aspirin if warranted from surgical standpoint for nasal polyp removal  - patient is at low risk for major acute cardiac events in the perioperative setting for polyp removal.  He can have polyp surgery without waiting for the stress test or echocardiogram  - stress test and echocardiogram have been ordered to assess for any significant ischemia.  - I will consider doing sleep study after the test results are back.  He does wake up multiple times at night.  Continue lisinopril HCTZ for blood pressure control.  - if stress test is positive may  consider starting beta blocker therapy.    Nilson Pulido M.D

## 2024-07-25 ENCOUNTER — PATIENT MESSAGE (OUTPATIENT)
Dept: CARDIOLOGY | Facility: CLINIC | Age: 69
End: 2024-07-25
Payer: MEDICARE

## 2024-07-27 LAB
OHS QRS DURATION: 110 MS
OHS QTC CALCULATION: 398 MS

## 2024-08-07 ENCOUNTER — OFFICE VISIT (OUTPATIENT)
Dept: UROLOGY | Facility: CLINIC | Age: 69
End: 2024-08-07
Payer: MEDICARE

## 2024-08-07 VITALS — HEIGHT: 69 IN | WEIGHT: 235.88 LBS | BODY MASS INDEX: 34.94 KG/M2

## 2024-08-07 DIAGNOSIS — N40.1 BENIGN PROSTATIC HYPERPLASIA WITH NOCTURIA: Primary | ICD-10-CM

## 2024-08-07 DIAGNOSIS — R35.1 BENIGN PROSTATIC HYPERPLASIA WITH NOCTURIA: Primary | ICD-10-CM

## 2024-08-07 DIAGNOSIS — Z80.42 FAMILY HISTORY OF PROSTATE CANCER: ICD-10-CM

## 2024-08-07 PROCEDURE — 3008F BODY MASS INDEX DOCD: CPT | Mod: CPTII,S$GLB,, | Performed by: UROLOGY

## 2024-08-07 PROCEDURE — 99999 PR PBB SHADOW E&M-EST. PATIENT-LVL III: CPT | Mod: PBBFAC,,, | Performed by: UROLOGY

## 2024-08-07 PROCEDURE — 99214 OFFICE O/P EST MOD 30 MIN: CPT | Mod: S$GLB,,, | Performed by: UROLOGY

## 2024-08-07 PROCEDURE — 4010F ACE/ARB THERAPY RXD/TAKEN: CPT | Mod: CPTII,S$GLB,, | Performed by: UROLOGY

## 2024-08-07 PROCEDURE — 1159F MED LIST DOCD IN RCRD: CPT | Mod: CPTII,S$GLB,, | Performed by: UROLOGY

## 2024-08-07 PROCEDURE — 3044F HG A1C LEVEL LT 7.0%: CPT | Mod: CPTII,S$GLB,, | Performed by: UROLOGY

## 2024-08-07 PROCEDURE — 1160F RVW MEDS BY RX/DR IN RCRD: CPT | Mod: CPTII,S$GLB,, | Performed by: UROLOGY

## 2024-08-07 PROCEDURE — 1126F AMNT PAIN NOTED NONE PRSNT: CPT | Mod: CPTII,S$GLB,, | Performed by: UROLOGY

## 2024-08-07 NOTE — PROGRESS NOTES
Subjective:      Patient ID: Jean Shaw Jr. is a 69 y.o. male.    Chief Complaint: Annual Exam    Mr. Shaw is a 70 yo gentleman with a history of prostate cancer in the family.  His brother has been diagnosed and been followed with watchful waiting.  His PSA is at 2.9.  The patient last had a PSA August of 2023.  The patient here for follow-up for annual will obtain PSA notify patient of results.  The patient is not requiring any prostate medications at this time.  He is undergoing cardiac evaluation prior to sinus surgery.  He is awaiting cardiac stress test.  Patient does get up 2-3 times nightly based on how much water intake he has in his not having difficulty with daytime frequency or urgency.  The patient has not being awoke when in order to urinate.  He has not sleeping well probably due to sinus obstruction.  Patient states sinuses or 85% blocked with polyps.    Benign Prostatic Hypertrophy  This is a chronic problem. The current episode started more than 1 year ago. The problem is unchanged. Irritative symptoms include frequency and nocturia. Irritative symptoms do not include urgency. Obstructive symptoms do not include dribbling, incomplete emptying, an intermittent stream, a slower stream, straining or a weak stream. Pertinent negatives include no chills, dysuria, genital pain, hematuria, hesitancy, nausea or vomiting. AUA score is 0-7. Exacerbated by: Fluid volume. Past treatments include nothing.     Review of Systems   Constitutional:  Negative for activity change, appetite change, chills, diaphoresis, fatigue, fever and unexpected weight change.   HENT:  Negative for congestion, hearing loss, sinus pressure and trouble swallowing.    Eyes:  Negative for photophobia, pain, discharge and visual disturbance.   Respiratory:  Negative for apnea, cough and shortness of breath.    Cardiovascular:  Negative for chest pain, palpitations and leg swelling.   Gastrointestinal:  Negative for abdominal  distention, abdominal pain, anal bleeding, blood in stool, constipation, diarrhea, nausea, rectal pain and vomiting.   Endocrine: Negative for cold intolerance, heat intolerance, polydipsia, polyphagia and polyuria.   Genitourinary:  Positive for frequency and nocturia. Negative for decreased urine volume, difficulty urinating, dysuria, enuresis, flank pain, genital sores, hematuria, hesitancy, incomplete emptying, penile discharge, penile pain, penile swelling, scrotal swelling, testicular pain and urgency.   Musculoskeletal:  Negative for arthralgias, back pain and myalgias.   Skin:  Negative for color change, pallor, rash and wound.   Allergic/Immunologic: Negative for environmental allergies, food allergies and immunocompromised state.   Neurological:  Negative for dizziness, seizures, weakness and headaches.   Hematological:  Negative for adenopathy. Does not bruise/bleed easily.   Psychiatric/Behavioral: Negative.        Objective:     Physical Exam  Constitutional:       Appearance: Normal appearance.   HENT:      Right Ear: External ear normal.      Left Ear: External ear normal.   Cardiovascular:      Pulses: Normal pulses.      Heart sounds: Normal heart sounds.   Pulmonary:      Effort: Pulmonary effort is normal.      Breath sounds: Normal breath sounds.   Abdominal:      Tenderness: There is no right CVA tenderness or left CVA tenderness.   Genitourinary:     Penis: Normal.       Testes: Normal.   Musculoskeletal:      Cervical back: Normal range of motion and neck supple.   Skin:     Capillary Refill: Capillary refill takes less than 2 seconds.   Neurological:      Mental Status: He is alert.      Gait: Gait normal.      Deep Tendon Reflexes: Reflexes normal.   Psychiatric:         Mood and Affect: Mood normal.         Behavior: Behavior normal.         Thought Content: Thought content normal.         Judgment: Judgment normal.        Assessment:      1. Benign prostatic hyperplasia with nocturia    2.  Family history of prostate cancer      Plan:     Patient Instructions   Obtain free and total PSA  Notify patient of results  Continue annual follow-up with PSA test

## 2024-08-12 ENCOUNTER — PATIENT MESSAGE (OUTPATIENT)
Dept: UROLOGY | Facility: CLINIC | Age: 69
End: 2024-08-12
Payer: MEDICARE

## 2024-08-19 ENCOUNTER — PATIENT MESSAGE (OUTPATIENT)
Dept: CARDIOLOGY | Facility: CLINIC | Age: 69
End: 2024-08-19
Payer: MEDICARE

## 2024-08-28 ENCOUNTER — TELEPHONE (OUTPATIENT)
Dept: CARDIOLOGY | Facility: CLINIC | Age: 69
End: 2024-08-28
Payer: MEDICARE

## 2024-08-28 NOTE — TELEPHONE ENCOUNTER
Spoke with patient.  Results given to patient.  His nasal surgery is scheduled for 08/30/2024.      ----- Message from Nilson Pulido MD sent at 8/28/2024 10:39 AM CDT -----   Jed Mckeon,     If you can let the patient know that I have reviewed all the cardiac tests.  Echocardiogram normal, possible scar on the underside of the heart noted on stress test that does not need any further workup prior to upcoming nasal polyp surgery. Please see me in clinic to discuss further.  ----- Message -----  From: Iram Yates MA  Sent: 8/28/2024   8:48 AM CDT  To: Nilson Pulido MD    Good Morning,     I think this message was meant for you!    Iram Jimenez  ----- Message -----  From: Adriel Yanes III, MD  Sent: 8/27/2024   3:47 PM CDT  To: Iram Yates MA    Did Cardiology clear him for surgery?  This is not something I do.      ----- Message -----  From: Iram Yates MA  Sent: 8/23/2024   4:46 PM CDT  To: Adriel Yanes III, MD      ----- Message -----  From: Jerri Torres  Sent: 8/23/2024   4:40 PM CDT  To: Joaquín HO III Staff    Type:  Needs Medical Advice    Who Called: pt  Symptoms (please be specific): pt needs to know after all the heart tests that were ran last week, Is he still able to have the surgery that is scheduled on 9/6? please call pt and let him know as soon as possible surgery sis scheduled 9/6   Would the patient rather a call back or a response via [facility]? call  Best Call Back Number:  [phone]  Additional Information:

## 2024-08-29 ENCOUNTER — TELEPHONE (OUTPATIENT)
Dept: OTOLARYNGOLOGY | Facility: CLINIC | Age: 69
End: 2024-08-29
Payer: MEDICARE

## 2024-08-29 NOTE — TELEPHONE ENCOUNTER
----- Message from Lance Parham sent at 8/29/2024  3:46 PM CDT -----  Regarding: Appt  Contact: [phone]  Pt calling regarding surgery on 9/6. States he has a sinus infection and would like to know if surgery can still be done with infection. Please call [phone]

## 2024-08-30 ENCOUNTER — OFFICE VISIT (OUTPATIENT)
Dept: OTOLARYNGOLOGY | Facility: CLINIC | Age: 69
End: 2024-08-30
Payer: MEDICARE

## 2024-08-30 ENCOUNTER — LAB VISIT (OUTPATIENT)
Dept: LAB | Facility: HOSPITAL | Age: 69
End: 2024-08-30
Attending: OTOLARYNGOLOGY
Payer: MEDICARE

## 2024-08-30 VITALS
DIASTOLIC BLOOD PRESSURE: 80 MMHG | WEIGHT: 233.94 LBS | BODY MASS INDEX: 34.54 KG/M2 | SYSTOLIC BLOOD PRESSURE: 132 MMHG | HEART RATE: 49 BPM

## 2024-08-30 DIAGNOSIS — R09.81 CHRONIC NASAL CONGESTION: ICD-10-CM

## 2024-08-30 DIAGNOSIS — Z01.818 PRE-OP TESTING: ICD-10-CM

## 2024-08-30 DIAGNOSIS — J33.9 NASAL POLYPS: ICD-10-CM

## 2024-08-30 DIAGNOSIS — J01.41 ACUTE RECURRENT PANSINUSITIS: Primary | ICD-10-CM

## 2024-08-30 DIAGNOSIS — J34.2 NASAL SEPTAL DEVIATION: ICD-10-CM

## 2024-08-30 LAB
ALBUMIN SERPL BCP-MCNC: 4.2 G/DL (ref 3.5–5.2)
ALP SERPL-CCNC: 97 U/L (ref 55–135)
ALT SERPL W/O P-5'-P-CCNC: 32 U/L (ref 10–44)
ANION GAP SERPL CALC-SCNC: 7 MMOL/L (ref 8–16)
AST SERPL-CCNC: 33 U/L (ref 10–40)
BASOPHILS # BLD AUTO: 0.06 K/UL (ref 0–0.2)
BASOPHILS NFR BLD: 0.7 % (ref 0–1.9)
BILIRUB SERPL-MCNC: 0.5 MG/DL (ref 0.1–1)
BUN SERPL-MCNC: 10 MG/DL (ref 8–23)
CALCIUM SERPL-MCNC: 9.9 MG/DL (ref 8.7–10.5)
CHLORIDE SERPL-SCNC: 102 MMOL/L (ref 95–110)
CO2 SERPL-SCNC: 30 MMOL/L (ref 23–29)
CREAT SERPL-MCNC: 0.9 MG/DL (ref 0.5–1.4)
DIFFERENTIAL METHOD BLD: ABNORMAL
EOSINOPHIL # BLD AUTO: 0.3 K/UL (ref 0–0.5)
EOSINOPHIL NFR BLD: 3.9 % (ref 0–8)
ERYTHROCYTE [DISTWIDTH] IN BLOOD BY AUTOMATED COUNT: 13 % (ref 11.5–14.5)
EST. GFR  (NO RACE VARIABLE): >60 ML/MIN/1.73 M^2
GLUCOSE SERPL-MCNC: 99 MG/DL (ref 70–110)
HCT VFR BLD AUTO: 43.1 % (ref 40–54)
HGB BLD-MCNC: 14.5 G/DL (ref 14–18)
IMM GRANULOCYTES # BLD AUTO: 0.01 K/UL (ref 0–0.04)
IMM GRANULOCYTES NFR BLD AUTO: 0.1 % (ref 0–0.5)
LYMPHOCYTES # BLD AUTO: 2.1 K/UL (ref 1–4.8)
LYMPHOCYTES NFR BLD: 24.4 % (ref 18–48)
MCH RBC QN AUTO: 30.3 PG (ref 27–31)
MCHC RBC AUTO-ENTMCNC: 33.6 G/DL (ref 32–36)
MCV RBC AUTO: 90 FL (ref 82–98)
MONOCYTES # BLD AUTO: 0.6 K/UL (ref 0.3–1)
MONOCYTES NFR BLD: 7.2 % (ref 4–15)
NEUTROPHILS # BLD AUTO: 5.6 K/UL (ref 1.8–7.7)
NEUTROPHILS NFR BLD: 63.7 % (ref 38–73)
NRBC BLD-RTO: 0 /100 WBC
PLATELET # BLD AUTO: 214 K/UL (ref 150–450)
PLATELET FUNCTION ASSAY - EPINEPHRINE: 157 SECS (ref 76–199)
PMV BLD AUTO: 9.1 FL (ref 9.2–12.9)
POTASSIUM SERPL-SCNC: 4 MMOL/L (ref 3.5–5.1)
PROT SERPL-MCNC: 7.5 G/DL (ref 6–8.4)
RBC # BLD AUTO: 4.78 M/UL (ref 4.6–6.2)
SODIUM SERPL-SCNC: 139 MMOL/L (ref 136–145)
WBC # BLD AUTO: 8.78 K/UL (ref 3.9–12.7)

## 2024-08-30 PROCEDURE — 36415 COLL VENOUS BLD VENIPUNCTURE: CPT | Performed by: OTOLARYNGOLOGY

## 2024-08-30 PROCEDURE — 85576 BLOOD PLATELET AGGREGATION: CPT | Performed by: OTOLARYNGOLOGY

## 2024-08-30 PROCEDURE — 85025 COMPLETE CBC W/AUTO DIFF WBC: CPT | Performed by: OTOLARYNGOLOGY

## 2024-08-30 PROCEDURE — 99999 PR PBB SHADOW E&M-EST. PATIENT-LVL III: CPT | Mod: PBBFAC,,, | Performed by: OTOLARYNGOLOGY

## 2024-08-30 PROCEDURE — 80053 COMPREHEN METABOLIC PANEL: CPT | Performed by: OTOLARYNGOLOGY

## 2024-08-30 RX ORDER — AMOXICILLIN AND CLAVULANATE POTASSIUM 875; 125 MG/1; MG/1
1 TABLET, FILM COATED ORAL 2 TIMES DAILY
Qty: 20 TABLET | Refills: 0 | Status: SHIPPED | OUTPATIENT
Start: 2024-08-30 | End: 2024-09-09

## 2024-08-30 RX ORDER — METHYLPREDNISOLONE 4 MG/1
TABLET ORAL
Qty: 1 EACH | Refills: 0 | Status: SHIPPED | OUTPATIENT
Start: 2024-08-30

## 2024-08-30 NOTE — PROGRESS NOTES
is a 69-year-old white male who is scheduled for Medtronic Fess, septoplasty, submucous resection of turbinates by me in one-week.  He presents today with complete nasal obstruction and recent history of purulent nasal discharge.  On exam he has bilateral intranasal polyposis causing complete obstruction.  Review of his CT scans show evidence of chronic recurrent sinus infections involving all of his sinuses as well as left-sided septal spur.  We have again discussed Medtronic Fess, septoplasty, submucous resection of turbinates to address these issues.  I have discussed the pros and cons risks and benefits as well as technical aspects of this procedure in detail with him and he understands and accepts these and wishes to proceed as scheduled.  I will prescribe Augmentin 875 p.o. b.i.d. times 10 days for him as well as a Medrol Dosepak to begin now.

## 2024-09-05 ENCOUNTER — ANESTHESIA EVENT (OUTPATIENT)
Dept: SURGERY | Facility: HOSPITAL | Age: 69
End: 2024-09-05
Payer: MEDICARE

## 2024-09-05 NOTE — ANESTHESIA PREPROCEDURE EVALUATION
09/05/2024  [facility]  Anesthesia Pre-Operative Evaluation         Patient Name: Jean Shaw Jr.  YOB: 1955  MRN: [id number]    SUBJECTIVE:     Pre-operative evaluation for Procedure(s) (LRB):  FESS, WITH NASAL SEPTOPLASTY AND TURBINATE REDUCTION, WITH IMAGING GUIDANCE (Bilateral)     09/05/2024    Jean Shaw Jr. is a 69 y.o. male w/ a significant PMHx of CAD (LHC in '14 showed 70% OM1 and 30% RCA dz, medically managed), GERD, asthma, HTN, BL HL.  Patient now presents for the above procedure(s).    TTE:     Left Ventricle: The left ventricle is normal in size. Normal wall thickness. There is normal systolic function. Biplane (2D) method of discs ejection fraction is 62%. There is normal diastolic function.    Right Ventricle: Normal right ventricular cavity size. Wall thickness is normal. Systolic function is normal.    Tricuspid Valve: There is mild regurgitation.    Pulmonary Artery: The estimated pulmonary artery systolic pressure is 39 mmHg.    IVC/SVC: Normal venous pressure at 3 mmHg.     Nuclear stress test: negative for ischemia     Patient Active Problem List   Diagnosis    Pure hypercholesterolemia    Mild intermittent asthma without complication    GERD (gastroesophageal reflux disease)    Coronary artery disease involving native coronary artery of native heart without angina pectoris    Non-rheumatic tricuspid valve insufficiency    Essential hypertension    PCT (porphyria cutanea tarda)    Prediabetes    Bilateral hearing loss    Nuclear sclerosis of right eye    Chronic pain of right knee    Benign prostatic hyperplasia with nocturia    Family history of prostate cancer    Aortic atherosclerosis       Review of patient's allergies indicates:  No Known Allergies    Current Inpatient Medications:      No current facility-administered medications on file  prior to encounter.     No current outpatient medications on file prior to encounter.       Past Surgical History:   Procedure Laterality Date    CARDIAC CATHETERIZATION  10/23/14    CATARACT EXTRACTION W/  INTRAOCULAR LENS IMPLANT Left 3/26/2019    Procedure: EXTRACTION, CATARACT, WITH IOL INSERTION;  Surgeon: Wilmar Jo MD;  Location: [facility];  Service: Ophthalmology;  Laterality: Left;    CATARACT EXTRACTION W/  INTRAOCULAR LENS IMPLANT Right 11/9/2020    Procedure: EXTRACTION, CATARACT, WITH IOL INSERTION;  Surgeon: Gladys Santiago MD;  Location: [facility] OR;  Service: Ophthalmology;  Laterality: Right;    COLONOSCOPY      HERNIA REPAIR  2011    HERNIA REPAIR      HERNIA REPAIR      ROBOT-ASSISTED LAPAROSCOPIC REPAIR OF VENTRAL HERNIA N/A 2/28/2022    Procedure: ROBOTIC REPAIR, HERNIA, VENTRAL;  Surgeon: Layton Acevedo MD;  Location: [facility] OR;  Service: General;  Laterality: N/A;    SKIN BIOPSY         OBJECTIVE:     Vital Signs Range (Last 24H):         Significant Labs:  Lab Results   Component Value Date    WBC 8.78 08/30/2024    HGB 14.5 08/30/2024    HCT 43.1 08/30/2024     08/30/2024    CHOL 137 07/05/2024    TRIG 62 07/05/2024    HDL 44 07/05/2024    ALT 32 08/30/2024    AST 33 08/30/2024     08/30/2024    K 4.0 08/30/2024     08/30/2024    CREATININE 0.9 08/30/2024    BUN 10 08/30/2024    CO2 30 (H) 08/30/2024    TSH 1.858 07/05/2024    PSA 3.0 07/22/2022    INR 1.0 10/14/2014    GLUF 99 10/10/2016    HGBA1C 5.6 07/05/2024       Diagnostic Studies: No relevant studies.    EKG:   Results for orders placed or performed in visit on 07/24/24   IN OFFICE EKG 12-LEAD (to [place])    Collection Time: 07/24/24  7:29 AM   Result Value Ref Range    QRS Duration 110 ms    [facility] QTC Calculation 398 ms    Narrative    Test Reason : I25.10,R07.9,    Vent. Rate : 058 BPM     Atrial Rate : 058 BPM     P-R Int : 154 ms          QRS Dur : 110 ms      QT Int : 406 ms       P-R-T Axes : 062 -57 071  degrees     QTc Int : 398 ms    Sinus bradycardia  Left anterior fascicular block  Abnormal ECG  When compared with ECG of 21-FEB-2022 10:07,  T wave inversion no longer evident in Inferior leads  Confirmed by Mike Mai MD, Yg ([id number]) on 7/27/2024 4:07:03 PM    Referred By: GAGAN AMEZQUITA           Confirmed By:Yg Mai,       ASSESSMENT/PLAN:           Pre-op Assessment    I have reviewed the Patient Summary Reports.     I have reviewed the Nursing Notes. I have reviewed the NPO Status.   I have reviewed the Medications.     Review of Systems  Anesthesia Hx:  No problems with previous Anesthesia   History of prior surgery of interest to airway management or planning:  Previous anesthesia: MAC, General          Denies Personal Hx of Anesthesia complications.                    Social:  No Alcohol Use, Non-Smoker       Hematology/Oncology:  Hematology Normal   Oncology Normal                                   Cardiovascular:  Exercise tolerance: good   Hypertension   CAD       Denies Angina.     hyperlipidemia                             Pulmonary:    Asthma mild  Denies Shortness of breath.                  Renal/:  Renal/ Normal                 Hepatic/GI:     GERD, well controlled Liver Disease,            Musculoskeletal:  Arthritis               Neurological:  Neurology Normal Denies TIA.  Denies CVA.    Denies Seizures.                                Endocrine:  Endocrine Normal            Psych:  Psychiatric Normal                    Physical Exam  General: Cooperative, Alert and Oriented    Airway:  Mallampati: II   Mouth Opening: Normal  TM Distance: Normal  Tongue: Normal  Neck ROM: Normal ROM    Dental:  Intact        Anesthesia Plan  Type of Anesthesia, risks & benefits discussed:    Anesthesia Type: Gen ETT  Intra-op Monitoring Plan: Standard ASA Monitors  Post Op Pain Control Plan: multimodal analgesia and IV/PO Opioids PRN  Induction:  IV  Airway Plan: Video,  Post-Induction  Informed Consent: Informed consent signed with the Patient and all parties understand the risks and agree with anesthesia plan.  All questions answered.   ASA Score: 3  Day of Surgery Review of History & Physical: H&P Update referred to the surgeon/provider.    Ready For Surgery From Anesthesia Perspective.     .

## 2024-09-05 NOTE — PRE-PROCEDURE INSTRUCTIONS
Patient was informed of pre-procedure instructions and arrival time. Pt verbalized understanding and is to be accompanied by nephew.    Patient denies taking any over-the-counter vitamins, supplements, nsaids or aspirin products for 7 days.

## 2024-09-05 NOTE — PRE-PROCEDURE INSTRUCTIONS
Unable to reach patient via phone. Left message with pre procedure instructions and arrival time. The following was sent to pt portal.    Dear Jean ,     You are scheduled for a procedure with Dr. Yanes on 9/6/2024. Your scheduled arrival time is 5:30 am.  This arrival time is roughly 2 hours before your anticipated procedure time to allow sufficient time for pre-op.  Please wear comfortable clothing  This procedure will take place at the [facility] at the corner of [facility] and [facility].  [facility] [place] [facility]. The address is:     [address] [place] [address].  [place] [id number]     After entering the building, proceed to the second floor and check in with registration.      Your fasting instructions are as follows:     Nothing to eat after 11:00 pm the evening before your surgery.   You may drink clear liquids up until 2 hours prior to your arrival time.      You MUST have a responsible adult to bring you home. Your surgery will be cancelled if you do not have a ride. ([employer] WILL NOT BE ACCEPTED AS A RESPONSIBLE RIDE)     Please hold the following medications the morning of your procedure:  -Aspirin and Aspirin-containing products (Goody's powder, Excedrin)  -NSAIDs (Advil, Ibuprofen, Aleve, Diclofenac)  -Vitamins/Supplements   -Herbal remedies/Teas  -Stimulants (Adderall, Vyvanse, Adipex)  -Diabetic medication   -Prescription creams/gels/lotions  -lisinopriL-hydrochlorothiazide (PRINZIDE,ZESTORETIC) 20-25 mg Tab      *May take Tylenol if needed         The evening before and morning of your procedure, take a shower using antibacterial soap (ex: Hibiclens or Dial antibacterial soap). DO NOT apply deodorant, lotion, cologne, or anything else to the skin. Do not wear jewelry or bring any valuables with you.  .     Please do not wear contact lenses the day of your procedure.   Bring any inhalers that you may need.     If you have any  procedure-specific questions, please call your surgeon's office. Any other questions, don't hesitate to call at [phone]     Thanks,  Pre-Admit Testing  Anesthesia Dept [facility]

## 2024-09-06 ENCOUNTER — HOSPITAL ENCOUNTER (OUTPATIENT)
Facility: HOSPITAL | Age: 69
Discharge: HOME | End: 2024-09-06
Attending: OTOLARYNGOLOGY | Admitting: OTOLARYNGOLOGY
Payer: MEDICARE

## 2024-09-06 ENCOUNTER — ANESTHESIA (OUTPATIENT)
Dept: SURGERY | Facility: HOSPITAL | Age: 69
End: 2024-09-06
Payer: MEDICARE

## 2024-09-06 VITALS
WEIGHT: 229 LBS | HEART RATE: 61 BPM | TEMPERATURE: 98 F | DIASTOLIC BLOOD PRESSURE: 72 MMHG | HEIGHT: 71 IN | RESPIRATION RATE: 20 BRPM | BODY MASS INDEX: 32.06 KG/M2 | SYSTOLIC BLOOD PRESSURE: 152 MMHG | OXYGEN SATURATION: 97 %

## 2024-09-06 DIAGNOSIS — J34.89 NASAL OBSTRUCTION: ICD-10-CM

## 2024-09-06 PROCEDURE — C2625 STENT, NON-COR, TEM W/DEL SY: HCPCS | Performed by: OTOLARYNGOLOGY

## 2024-09-06 PROCEDURE — 37000009 HC ANESTHESIA EA ADD 15 MINS: Performed by: OTOLARYNGOLOGY

## 2024-09-06 PROCEDURE — 31256 EXPLORATION MAXILLARY SINUS: CPT | Mod: 50,51,, | Performed by: OTOLARYNGOLOGY

## 2024-09-06 PROCEDURE — 36000710: Performed by: OTOLARYNGOLOGY

## 2024-09-06 PROCEDURE — 30520 REPAIR OF NASAL SEPTUM: CPT | Mod: 51,,, | Performed by: OTOLARYNGOLOGY

## 2024-09-06 PROCEDURE — 25000003 PHARM REV CODE 250: Performed by: NURSE ANESTHETIST, CERTIFIED REGISTERED

## 2024-09-06 PROCEDURE — 61782 SCAN PROC CRANIAL EXTRA: CPT | Mod: ,,, | Performed by: OTOLARYNGOLOGY

## 2024-09-06 PROCEDURE — 63600175 PHARM REV CODE 636 W HCPCS: Mod: JZ,JG | Performed by: OTOLARYNGOLOGY

## 2024-09-06 PROCEDURE — 30140 RESECT INFERIOR TURBINATE: CPT | Mod: 50,51,, | Performed by: OTOLARYNGOLOGY

## 2024-09-06 PROCEDURE — 31259 NSL/SINS NDSC SPHN TISS RMVL: CPT | Mod: 50,,, | Performed by: OTOLARYNGOLOGY

## 2024-09-06 PROCEDURE — 88304 TISSUE EXAM BY PATHOLOGIST: CPT | Performed by: PATHOLOGY

## 2024-09-06 PROCEDURE — 36000711: Performed by: OTOLARYNGOLOGY

## 2024-09-06 PROCEDURE — 63600175 PHARM REV CODE 636 W HCPCS: Mod: JZ,JG | Performed by: NURSE ANESTHETIST, CERTIFIED REGISTERED

## 2024-09-06 PROCEDURE — 25000003 PHARM REV CODE 250: Performed by: OTOLARYNGOLOGY

## 2024-09-06 PROCEDURE — 31276 NSL/SINS NDSC FRNT TISS RMVL: CPT | Mod: 50,51,, | Performed by: OTOLARYNGOLOGY

## 2024-09-06 PROCEDURE — 94761 N-INVAS EAR/PLS OXIMETRY MLT: CPT

## 2024-09-06 PROCEDURE — 88305 TISSUE EXAM BY PATHOLOGIST: CPT | Mod: 59 | Performed by: PATHOLOGY

## 2024-09-06 PROCEDURE — 71000033 HC RECOVERY, INTIAL HOUR: Performed by: OTOLARYNGOLOGY

## 2024-09-06 PROCEDURE — 71000015 HC POSTOP RECOV 1ST HR: Performed by: OTOLARYNGOLOGY

## 2024-09-06 PROCEDURE — 27201423 OPTIME MED/SURG SUP & DEVICES STERILE SUPPLY: Performed by: OTOLARYNGOLOGY

## 2024-09-06 PROCEDURE — 99900035 HC TECH TIME PER 15 MIN (STAT)

## 2024-09-06 PROCEDURE — 37000008 HC ANESTHESIA 1ST 15 MINUTES: Performed by: OTOLARYNGOLOGY

## 2024-09-06 DEVICE — IMPLANT PROPEL MOMETASONE: Type: IMPLANTABLE DEVICE | Site: SINUS | Status: FUNCTIONAL

## 2024-09-06 RX ORDER — HYDROMORPHONE HYDROCHLORIDE 1 MG/ML
0.2 INJECTION, SOLUTION INTRAMUSCULAR; INTRAVENOUS; SUBCUTANEOUS EVERY 5 MIN PRN
Status: DISCONTINUED | OUTPATIENT
Start: 2024-09-06 | End: 2024-09-06 | Stop reason: HOSPADM

## 2024-09-06 RX ORDER — ROCURONIUM BROMIDE 10 MG/ML
INJECTION, SOLUTION INTRAVENOUS
Status: DISCONTINUED | OUTPATIENT
Start: 2024-09-06 | End: 2024-09-06

## 2024-09-06 RX ORDER — PROCHLORPERAZINE EDISYLATE 5 MG/ML
5 INJECTION INTRAMUSCULAR; INTRAVENOUS EVERY 30 MIN PRN
Status: DISCONTINUED | OUTPATIENT
Start: 2024-09-06 | End: 2024-09-06 | Stop reason: HOSPADM

## 2024-09-06 RX ORDER — LIDOCAINE HYDROCHLORIDE 20 MG/ML
INJECTION INTRAVENOUS
Status: DISCONTINUED | OUTPATIENT
Start: 2024-09-06 | End: 2024-09-06

## 2024-09-06 RX ORDER — KETAMINE HCL IN 0.9 % NACL 50 MG/5 ML
SYRINGE (ML) INTRAVENOUS
Status: DISCONTINUED | OUTPATIENT
Start: 2024-09-06 | End: 2024-09-06

## 2024-09-06 RX ORDER — DEXMEDETOMIDINE HYDROCHLORIDE 100 UG/ML
INJECTION, SOLUTION INTRAVENOUS
Status: DISCONTINUED | OUTPATIENT
Start: 2024-09-06 | End: 2024-09-06

## 2024-09-06 RX ORDER — BACITRACIN ZINC 500 UNIT/G
OINTMENT (GRAM) TOPICAL
Status: DISCONTINUED | OUTPATIENT
Start: 2024-09-06 | End: 2024-09-06 | Stop reason: HOSPADM

## 2024-09-06 RX ORDER — PROPOFOL 10 MG/ML
VIAL (ML) INTRAVENOUS
Status: DISCONTINUED | OUTPATIENT
Start: 2024-09-06 | End: 2024-09-06

## 2024-09-06 RX ORDER — ACETAMINOPHEN 10 MG/ML
INJECTION, SOLUTION INTRAVENOUS
Status: DISCONTINUED | OUTPATIENT
Start: 2024-09-06 | End: 2024-09-06

## 2024-09-06 RX ORDER — AMOXICILLIN AND CLAVULANATE POTASSIUM 875; 125 MG/1; MG/1
1 TABLET, FILM COATED ORAL 2 TIMES DAILY
Qty: 14 TABLET | Refills: 0 | Status: SHIPPED | OUTPATIENT
Start: 2024-09-06 | End: 2024-09-13

## 2024-09-06 RX ORDER — OXYMETAZOLINE HCL 0.05 %
SPRAY, NON-AEROSOL (ML) NASAL
Status: DISCONTINUED | OUTPATIENT
Start: 2024-09-06 | End: 2024-09-06 | Stop reason: HOSPADM

## 2024-09-06 RX ORDER — DEXAMETHASONE SODIUM PHOSPHATE 4 MG/ML
INJECTION, SOLUTION INTRA-ARTICULAR; INTRALESIONAL; INTRAMUSCULAR; INTRAVENOUS; SOFT TISSUE
Status: DISCONTINUED | OUTPATIENT
Start: 2024-09-06 | End: 2024-09-06

## 2024-09-06 RX ORDER — MUPIROCIN 20 MG/G
OINTMENT TOPICAL 2 TIMES DAILY
Qty: 22 G | Refills: 0 | Status: SHIPPED | OUTPATIENT
Start: 2024-09-06 | End: 2024-09-20

## 2024-09-06 RX ORDER — GLUCAGON 1 MG
1 KIT INJECTION
Status: DISCONTINUED | OUTPATIENT
Start: 2024-09-06 | End: 2024-09-06 | Stop reason: HOSPADM

## 2024-09-06 RX ORDER — SODIUM CHLORIDE 9 MG/ML
INJECTION, SOLUTION INTRAVENOUS CONTINUOUS
Status: DISCONTINUED | OUTPATIENT
Start: 2024-09-06 | End: 2024-09-06 | Stop reason: HOSPADM

## 2024-09-06 RX ORDER — CEFAZOLIN SODIUM 1 G/3ML
INJECTION, POWDER, FOR SOLUTION INTRAMUSCULAR; INTRAVENOUS
Status: DISCONTINUED | OUTPATIENT
Start: 2024-09-06 | End: 2024-09-06

## 2024-09-06 RX ORDER — VITAMIN A 3000 MCG
2 CAPSULE ORAL EVERY 4 HOURS
Qty: 44 ML | Refills: 12 | Status: SHIPPED | OUTPATIENT
Start: 2024-09-06

## 2024-09-06 RX ORDER — LIDOCAINE HYDROCHLORIDE 10 MG/ML
1 INJECTION, SOLUTION EPIDURAL; INFILTRATION; INTRACAUDAL; PERINEURAL ONCE AS NEEDED
Status: DISCONTINUED | OUTPATIENT
Start: 2024-09-06 | End: 2024-09-06 | Stop reason: HOSPADM

## 2024-09-06 RX ORDER — ONDANSETRON HYDROCHLORIDE 2 MG/ML
INJECTION, SOLUTION INTRAVENOUS
Status: DISCONTINUED | OUTPATIENT
Start: 2024-09-06 | End: 2024-09-06

## 2024-09-06 RX ORDER — PHENYLEPHRINE HCL IN 0.9% NACL 1 MG/10 ML
SYRINGE (ML) INTRAVENOUS
Status: DISCONTINUED | OUTPATIENT
Start: 2024-09-06 | End: 2024-09-06

## 2024-09-06 RX ORDER — BUPIVACAINE HYDROCHLORIDE 2.5 MG/ML
INJECTION, SOLUTION EPIDURAL; INFILTRATION; INTRACAUDAL
Status: DISCONTINUED | OUTPATIENT
Start: 2024-09-06 | End: 2024-09-06 | Stop reason: HOSPADM

## 2024-09-06 RX ORDER — EPHEDRINE SULFATE 50 MG/ML
INJECTION, SOLUTION INTRAVENOUS
Status: DISCONTINUED | OUTPATIENT
Start: 2024-09-06 | End: 2024-09-06

## 2024-09-06 RX ORDER — ONDANSETRON 4 MG/1
4 TABLET, ORALLY DISINTEGRATING ORAL EVERY 6 HOURS PRN
Qty: 20 TABLET | Refills: 0 | Status: SHIPPED | OUTPATIENT
Start: 2024-09-06

## 2024-09-06 RX ORDER — EPINEPHRINE 1 MG/ML
INJECTION INTRAMUSCULAR; INTRAVENOUS; SUBCUTANEOUS
Status: DISCONTINUED | OUTPATIENT
Start: 2024-09-06 | End: 2024-09-06 | Stop reason: HOSPADM

## 2024-09-06 RX ORDER — OXYCODONE AND ACETAMINOPHEN 5; 325 MG/1; MG/1
1 TABLET ORAL EVERY 6 HOURS PRN
Qty: 25 TABLET | Refills: 0 | Status: SHIPPED | OUTPATIENT
Start: 2024-09-06

## 2024-09-06 RX ORDER — FENTANYL CITRATE 50 UG/ML
INJECTION, SOLUTION INTRAMUSCULAR; INTRAVENOUS
Status: DISCONTINUED | OUTPATIENT
Start: 2024-09-06 | End: 2024-09-06

## 2024-09-06 RX ORDER — MIDAZOLAM HYDROCHLORIDE 1 MG/ML
INJECTION INTRAMUSCULAR; INTRAVENOUS
Status: DISCONTINUED | OUTPATIENT
Start: 2024-09-06 | End: 2024-09-06

## 2024-09-06 RX ORDER — SODIUM CHLORIDE 0.9 % (FLUSH) 0.9 %
10 SYRINGE (ML) INJECTION DAILY PRN
Status: DISCONTINUED | OUTPATIENT
Start: 2024-09-06 | End: 2024-09-06 | Stop reason: HOSPADM

## 2024-09-06 RX ORDER — OXYCODONE HYDROCHLORIDE 5 MG/1
5 TABLET ORAL
Status: DISCONTINUED | OUTPATIENT
Start: 2024-09-06 | End: 2024-09-06 | Stop reason: HOSPADM

## 2024-09-06 RX ADMIN — Medication 100 MCG: at 08:09

## 2024-09-06 RX ADMIN — Medication 20 MG: at 07:09

## 2024-09-06 RX ADMIN — MIDAZOLAM 2 MG: 1 INJECTION INTRAMUSCULAR; INTRAVENOUS at 07:09

## 2024-09-06 RX ADMIN — PROPOFOL 200 MG: 10 INJECTION, EMULSION INTRAVENOUS at 07:09

## 2024-09-06 RX ADMIN — ACETAMINOPHEN 1000 MG: 10 INJECTION INTRAVENOUS at 08:09

## 2024-09-06 RX ADMIN — Medication 150 MCG: at 08:09

## 2024-09-06 RX ADMIN — EPHEDRINE SULFATE 10 MG: 50 INJECTION INTRAVENOUS at 08:09

## 2024-09-06 RX ADMIN — DEXMEDETOMIDINE HYDROCHLORIDE 8 MCG: 100 INJECTION, SOLUTION INTRAVENOUS at 09:09

## 2024-09-06 RX ADMIN — ROCURONIUM BROMIDE 50 MG: 10 INJECTION INTRAVENOUS at 07:09

## 2024-09-06 RX ADMIN — LIDOCAINE HYDROCHLORIDE 100 MG: 20 INJECTION INTRAVENOUS at 07:09

## 2024-09-06 RX ADMIN — SODIUM CHLORIDE: 0.9 INJECTION, SOLUTION INTRAVENOUS at 06:09

## 2024-09-06 RX ADMIN — FENTANYL CITRATE 100 MCG: 50 INJECTION, SOLUTION INTRAMUSCULAR; INTRAVENOUS at 07:09

## 2024-09-06 RX ADMIN — SODIUM CHLORIDE: 0.9 INJECTION, SOLUTION INTRAVENOUS at 09:09

## 2024-09-06 RX ADMIN — SUGAMMADEX 150 MG: 100 INJECTION, SOLUTION INTRAVENOUS at 09:09

## 2024-09-06 RX ADMIN — DEXAMETHASONE SODIUM PHOSPHATE 12 MG: 4 INJECTION INTRA-ARTICULAR; INTRALESIONAL; INTRAMUSCULAR; INTRAVENOUS; SOFT TISSUE at 08:09

## 2024-09-06 RX ADMIN — GLYCOPYRROLATE 0.1 MG: 0.2 INJECTION INTRAMUSCULAR; INTRAVENOUS at 07:09

## 2024-09-06 RX ADMIN — EPHEDRINE SULFATE 5 MG: 50 INJECTION INTRAVENOUS at 09:09

## 2024-09-06 RX ADMIN — ONDANSETRON 4 MG: 2 INJECTION INTRAMUSCULAR; INTRAVENOUS at 07:09

## 2024-09-06 RX ADMIN — Medication 10 MG: at 08:09

## 2024-09-06 RX ADMIN — CEFAZOLIN 2 G: 330 INJECTION, POWDER, FOR SOLUTION INTRAMUSCULAR; INTRAVENOUS at 08:09

## 2024-09-06 NOTE — TRANSFER OF CARE
"Anesthesia Transfer of Care Note    Patient: Jean Shaw Jr.    Procedure(s) Performed: Procedure(s) (LRB):  FESS, WITH NASAL SEPTOPLASTY AND TURBINATE REDUCTION, WITH IMAGING GUIDANCE (Bilateral)    Patient location: PACU    Anesthesia Type: general    Transport from OR: Transported from OR on 6-10 L/min O2 by face mask with adequate spontaneous ventilation    Post pain: adequate analgesia    Post vital signs: stable    Level of consciousness: sedated    Nausea/Vomiting: no nausea/vomiting    Complications: none    Transfer of care protocol was followed      Last vitals: Visit Vitals  BP (!) 144/69 (BP Location: Left arm, Patient Position: Lying)   Pulse (!) 52   Temp 37.1 °C (98.8 °F) (Temporal)   Resp 12   Ht 5' 11" (1.803 m)   Wt 103.9 kg (229 lb)   SpO2 96%   BMI 31.94 kg/m²     "

## 2024-09-06 NOTE — ANESTHESIA POSTPROCEDURE EVALUATION
Anesthesia Post Evaluation    Patient: Jean Shaw JrSoledad    Procedure(s) Performed: Procedure(s) (LRB):  FESS, WITH NASAL SEPTOPLASTY AND TURBINATE REDUCTION, WITH IMAGING GUIDANCE (Bilateral)    Final Anesthesia Type: general      Patient location during evaluation: PACU  Patient participation: Yes- Able to Participate  Level of consciousness: awake and alert, oriented and awake  Post-procedure vital signs: reviewed and stable  Pain management: adequate  Airway patency: patent  BRADLEY mitigation strategies: Multimodal analgesia  PONV status at discharge: No PONV  Anesthetic complications: no      Cardiovascular status: blood pressure returned to baseline and hemodynamically stable  Respiratory status: unassisted and spontaneous ventilation  Hydration status: euvolemic  Follow-up not needed.              Vitals Value Taken Time   /72 09/06/24 1051   Temp 36.8 °C (98.2 °F) 09/06/24 0954   Pulse 61 09/06/24 1052   Resp 20 09/06/24 1052   SpO2 96 % 09/06/24 1051   Vitals shown include unfiled device data.      Event Time   Out of Recovery 10:30:00         Pain/Adelaida Score: Adelaida Score: 10 (9/6/2024 10:45 AM)

## 2024-09-06 NOTE — DISCHARGE INSTRUCTIONS
Keep nasal packing in until follow up appointment with Dr. Yanes on 9/12  Use salt water spray over the nasal packing every 4 hours to prevent drying/crusting.  If having to change out the moustache dressing often because of bleeding, can also use oxymetazoline (Afrin) nasal spray over the nasal packing three times a day to slow down bleeding.    Do not use CPAP machine until nasal packing is removed.  To reduce swelling:  - Keep head of bed elevated at all times (approx 30 degrees).  - Minimize salt intake and vigorous chewing       DO NOT CALL [facility] ON CALL FOR POSTOPERATIVE PROBLEMS. CALL THE  [phone] AND ASK FOR ENT ON CALL.

## 2024-09-06 NOTE — OP NOTE
Op Note    DATE OF PROCEDURE: 09/06/2024      PREOPERATIVE DIAGNOSES:  1. Chronic rhinosinusitis.  2. Deviated nasal septum.  3. Inferior turbinate hypertrophy.    POSTOPERATIVE DIAGNOSES:  1. Chronic rhinosinusitis.  2. Deviated nasal septum.  3. Inferior turbinate hypertrophy.    PROCEDURES PERFORMED:  1. Medtronic FESS CPT 01250  2. Bilateral maxillary antrostomy with removal of contents. CPT 93541-05  3. Bilateral total ethmoidectomy with frontal sinus exploration and removal of polypoid contents. CPT 98795-74  4. Bilateral sphenoid sinus exploration with removal of polypoid contents CPT 07701-83  5. Septoplasty CPT 07237  6. Bilateral submucosal reduction of turbinates CPT 75847-65      Findings: grade 3 polyposis bilaterally emanating from the fronto ethmoid recess. Medialized atrophic middle turbinates bilaterally.   SURGEON: GARY Yanes III, M.D.    ASSISTANT SURGEON: Luisito Leon MD (RES)      ANESTHESIA: General anesthesia with endotracheal intubation.    ESTIMATED BLOOD LOSS: 30    INDICATION FOR PROCEDURE: The patient is a 69 y.o.  male   who had a CT   sinus performed, which was consistent with mucosal thickening in all paranasal sinuses. The patient was also found to   have a right septal deviation with bone spur in this location. The patient was   then therefore consented for a functional endoscopic sinus surgery, septoplasty   and submucosal reduction of turbinates.    PROCEDURE IN DETAIL: After the appropriate consents were obtained, the patient   was brought to the Operating Room. He was positioned supine on the operating   room table. After successful endotracheal intubation and general anesthesia was  initiated, the patient was then rotated 180 degrees away from anesthesia. The   patient was then prepped and draped in the usual fashion. A complete timeout   was then held with the surgical nursing and Anesthesia teams.  Attention was first turned towards functional endoscopic sinus  surgery.     Attention was then turned towards septoplasty. A right hemitransfixion incision was   then made using a #15 blade. A right mucoperichondrial flap was then raised   using a Montezuma elevator. This was carried to the bony cartilaginous junction.   The cartilage was then dislocated from the bone at this location. Then a   contralateral periosteal flap was raised and then an ipsilateral mucoperiosteal   flap was raised A superior cut was then made using Metzenbaum scissors. A more   inferior cut was then also made using Metzenbaum scissors. The bony septum was   then resected using Tobi forceps. The mucosa was lifted from the bony spur  on the left. This again was resected with Tobi forceps. The initial   incision was then closed with 4-0 chromic suture in a simple interrupted   fashion. Next, the flaps were coapted with the septal stapler.     Attention was then turned towards bilateral submucosal reduction of turbinates.   This was performed on the right hand side using a powered microdebrider with   turbinate blade. Outfracturing was then performed with a Goldmann elevator.   This was similarly performed on the left hand side with a powered microdebrided   with inferior turbinate blade as well as outfracturing with Goldmann elevator.      The   left nasal cavity was examined with the 0-degree endoscope, the middle meatus   was identified and the middle turbinate was medialized. The ethmoid bulla was   identified and entered with a straight suction under stealth image guidance.   The ethmoid contents were then removed with straight Blakesley forceps. Further  debridement of the ethmoid cells was then performed using a powered   microdebrider. This was carried to the face of the sphenoid sinus.     The frontal ethmoid recess was explored using curved   suction under stealth image guidance. Contents were then removed from the   Frontal sinus using upbiting forceps and powered microdebrider. The  natural  maxillary ostia was then identified. This was then entered with a curved   suction under stealth image guidance. Contents were then removed from the   maxillary sinus using a straight Blakesley forceps. Maxillary ostia was then   widened using powered microdebrider.  Attention was then turned to the sphenoid sinus.  Utilizing the stealth image guidance the sphenoid ostium was identified.  This was widened and debrided of polypoid tissue utilizing the powered microdebrider.  Attention was then turned towards the right nasal cavity. In a similar fashion,  the right ethmoid bulla was identified and entered with straight suction. The   contents were then removed with straight Blakesley forceps and further   debridement was carried out with the powered microdebrider.  The frontal ethmoid recess was explored using curved   suction under stealth image guidance. Contents were then removed from the   Frontal sinus using upbiting forceps and powered microdebrider.  Again, the   maxillary ostia was identified and entered with a curved suction. The maxillary  contents further removed with straight Blakesley forceps. The natural   maxillary ostia was then widened using a powered microdebrider.  Attention was then turned to the sphenoid sinus  and in a like manner to the opposite side the ostium was identified and enlarged with polypoid debridement utilizing  the power microdebrider.      Next, drug-eluting stents were placed in the bilateral ethmoid cavities. Also,   Silastic splints were applied to the bilateral septum. Finally, the nose was   packed with Telfa rolls. At this point, the patient was turned back towards   Anesthesia. He was successfully awakened in the Operating Room. The patient was then   transferred to Postanesthesia Care Unit in stable condition.  DISPOSITION: Transferred to Postanesthesia Care Unit.  COMPLICATIONS: None.  Dr. Yanes was present and participated in the entirety of the case.

## 2024-09-06 NOTE — PLAN OF CARE
Discharge instructions reviewed with pt and family. Both v/u of instructions; questions answered. VSS. IV removed with catheter tip intact. Prescriptions received by family member from pharmacy. No concerns voiced; all questions answered. Escorted patient via wheelchair to family member awaiting in private vehicle.

## 2024-09-06 NOTE — BRIEF OP NOTE
[facility]  Brief Operative Note    Surgery Date: 9/6/2024     Surgeons and Role:     * Adriel Yanes III, MD - Primary    Assisting Surgeon: None    Pre-op Diagnosis:  Nasal polyps [J33.9]  Nasal septal deviation [J34.2]  Chronic nasal congestion [R09.81]    Post-op Diagnosis:  Post-Op Diagnosis Codes:     * Nasal polyps [J33.9]     * Nasal septal deviation [J34.2]     * Chronic nasal congestion [R09.81]    Procedure(s) (LRB):  FESS, WITH NASAL SEPTOPLASTY AND TURBINATE REDUCTION, WITH IMAGING GUIDANCE (Bilateral)    Anesthesia: General    Operative Findings: see op note    Estimated Blood Loss: * No values recorded between 9/6/2024  8:09 AM and 9/6/2024  9:44 AM *         Specimens:   Specimen (24h ago, onward)       Start     Ordered    09/06/24 0936  Specimen to Pathology, Surgery ENT  Once        Comments: Pre-op Diagnosis: Nasal polyps [J33.9]Nasal septal deviation [J34.2]Chronic nasal congestion [R09.81]Procedure(s):FESS, WITH NASAL SEPTOPLASTY AND TURBINATE REDUCTION, WITH IMAGING GUIDANCE Number of specimens: 7Name of specimens: 1.) Right intranasal polyp - Permanent2.) Left intranasal polyp - Permanent3.) Right Frontal Sinus - Permanent4.) Right Maxillary Sinus - Permanent5.) Left Ethmoid Sinus - Permanent6.) Left Frontal Sinus - Permanent7.) Left Maxillary Sinus - Permanent     References:    Click here for ordering Quick Tip   Question Answer Comment   Procedure Type: ENT    Specimen Class: Routine/Screening    Release to patient Immediate        09/06/24 0937                      Discharge Note    OUTCOME: Patient tolerated treatment/procedure well without complication and is now ready for discharge.    DISPOSITION: Home or Self Care    FINAL DIAGNOSIS:  <principal problem not specified>    FOLLOWUP: In clinic    DISCHARGE INSTRUCTIONS:  No discharge procedures on file.

## 2024-09-06 NOTE — ANESTHESIA PROCEDURE NOTES
Intubation    Date/Time: 9/6/2024 7:46 AM    Performed by: Ralph Aleman CRNA  Authorized by: Eduin Mo MD    Intubation:     Induction:  Intravenous    Intubated:  Postinduction    Mask Ventilation:  Easy mask    Attempts:  1    Attempted By:  CRNA    Method of Intubation:  Video laryngoscopy    Blade:  Ga 3    Laryngeal View Grade: Grade I - full view of cords      Difficult Airway Encountered?: No      Complications:  None    Airway Device:  Oral sidney    Airway Device Size:  8.0    Style/Cuff Inflation:  Cuffed (inflated to minimal occlusive pressure)    Inflation Amount (mL):  6    Tube secured:  22    Secured at:  The lips    Placement Verified By:  Capnometry    Complicating Factors:  None    Findings Post-Intubation:  BS equal bilateral and atraumatic/condition of teeth unchanged

## 2024-09-10 LAB
FINAL PATHOLOGIC DIAGNOSIS: NORMAL
GROSS: NORMAL
Lab: NORMAL

## 2024-09-12 ENCOUNTER — OFFICE VISIT (OUTPATIENT)
Dept: OTOLARYNGOLOGY | Facility: CLINIC | Age: 69
End: 2024-09-12
Payer: MEDICARE

## 2024-09-12 VITALS
HEART RATE: 50 BPM | BODY MASS INDEX: 31.89 KG/M2 | WEIGHT: 228.63 LBS | SYSTOLIC BLOOD PRESSURE: 109 MMHG | DIASTOLIC BLOOD PRESSURE: 64 MMHG

## 2024-09-12 DIAGNOSIS — Z98.890 S/P FESS (FUNCTIONAL ENDOSCOPIC SINUS SURGERY): Primary | ICD-10-CM

## 2024-09-12 DIAGNOSIS — J34.2 NASAL SEPTAL DEVIATION: ICD-10-CM

## 2024-09-12 DIAGNOSIS — Z98.890 POST-OPERATIVE STATE: ICD-10-CM

## 2024-09-12 PROCEDURE — 99999 PR PBB SHADOW E&M-EST. PATIENT-LVL III: CPT | Mod: PBBFAC,,, | Performed by: OTOLARYNGOLOGY

## 2024-09-12 NOTE — PROGRESS NOTES
One week S/P Medtronic FESS with bilateral maxillaries,ethmoids,frontals,sphenoids septoplasty,SMR turbs.  All splints, packs,and sutures removed.   Exam with scope # [id number] and bilateral debris,crusting,and devitalized tissue removed, stents in place.  Plan: Begin compounded rinses.  Saline spray/gel  RTC 1 week

## 2024-09-16 ENCOUNTER — OFFICE VISIT (OUTPATIENT)
Dept: CARDIOLOGY | Facility: CLINIC | Age: 69
End: 2024-09-16
Payer: MEDICARE

## 2024-09-16 VITALS
WEIGHT: 235.25 LBS | HEART RATE: 96 BPM | DIASTOLIC BLOOD PRESSURE: 62 MMHG | OXYGEN SATURATION: 97 % | SYSTOLIC BLOOD PRESSURE: 102 MMHG | BODY MASS INDEX: 32.94 KG/M2 | HEIGHT: 71 IN

## 2024-09-16 DIAGNOSIS — E78.5 HYPERLIPIDEMIA, UNSPECIFIED HYPERLIPIDEMIA TYPE: Primary | ICD-10-CM

## 2024-09-16 DIAGNOSIS — I10 HYPERTENSION, UNSPECIFIED TYPE: ICD-10-CM

## 2024-09-16 DIAGNOSIS — I25.10 CORONARY ARTERY DISEASE WITHOUT ANGINA PECTORIS, UNSPECIFIED VESSEL OR LESION TYPE, UNSPECIFIED WHETHER NATIVE OR TRANSPLANTED HEART: ICD-10-CM

## 2024-09-16 DIAGNOSIS — K21.9 GASTROESOPHAGEAL REFLUX DISEASE, UNSPECIFIED WHETHER ESOPHAGITIS PRESENT: ICD-10-CM

## 2024-09-16 PROCEDURE — 3074F SYST BP LT 130 MM HG: CPT | Mod: CPTII,S$GLB,, | Performed by: INTERNAL MEDICINE

## 2024-09-16 PROCEDURE — 99999 PR PBB SHADOW E&M-EST. PATIENT-LVL III: CPT | Mod: PBBFAC,,, | Performed by: INTERNAL MEDICINE

## 2024-09-16 PROCEDURE — 99214 OFFICE O/P EST MOD 30 MIN: CPT | Mod: S$GLB,,, | Performed by: INTERNAL MEDICINE

## 2024-09-16 PROCEDURE — 4010F ACE/ARB THERAPY RXD/TAKEN: CPT | Mod: CPTII,S$GLB,, | Performed by: INTERNAL MEDICINE

## 2024-09-16 PROCEDURE — 3288F FALL RISK ASSESSMENT DOCD: CPT | Mod: CPTII,S$GLB,, | Performed by: INTERNAL MEDICINE

## 2024-09-16 PROCEDURE — 1126F AMNT PAIN NOTED NONE PRSNT: CPT | Mod: CPTII,S$GLB,, | Performed by: INTERNAL MEDICINE

## 2024-09-16 PROCEDURE — 1101F PT FALLS ASSESS-DOCD LE1/YR: CPT | Mod: CPTII,S$GLB,, | Performed by: INTERNAL MEDICINE

## 2024-09-16 PROCEDURE — 3008F BODY MASS INDEX DOCD: CPT | Mod: CPTII,S$GLB,, | Performed by: INTERNAL MEDICINE

## 2024-09-16 PROCEDURE — 3078F DIAST BP <80 MM HG: CPT | Mod: CPTII,S$GLB,, | Performed by: INTERNAL MEDICINE

## 2024-09-16 PROCEDURE — 3044F HG A1C LEVEL LT 7.0%: CPT | Mod: CPTII,S$GLB,, | Performed by: INTERNAL MEDICINE

## 2024-09-16 NOTE — PROGRESS NOTES
Subjective:   @Patient ID:  Jean Shaw Jr. is a 69 y.o. male who presents for evaluation of No chief complaint on file.      HPI:     Patient is a 69-year-old male with past medical history of coronary artery disease left heart catheterization done in 2014 for abnormal stress test noted to have 70% OM1 disease and 30% RCA disease that is medically managed, previous echocardiogram without any major abnormalities.  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  Able to cut grass without any difficulty with a push lawnmower.    Previously had some atypical chest pain symptoms that resolved spontaneously.  No major perfusion defects noted on recent stress echocardiogram.  Previously seen in the cardiology clinic for preoperative cardiac risk assessment for   Nasal polyp removal        Euvolemic on examination.  LDL 80 A1c of 5.6 on blood workup this year.   Results for orders placed during the hospital encounter of 08/15/24    Echo    Interpretation Summary    Left Ventricle: The left ventricle is normal in size. Normal wall thickness. There is normal systolic function. Biplane (2D) method of discs ejection fraction is 62%. There is normal diastolic function.    Right Ventricle: Normal right ventricular cavity size. Wall thickness is normal. Systolic function is normal.    Tricuspid Valve: There is mild regurgitation.    Pulmonary Artery: The estimated pulmonary artery systolic pressure is 39 mmHg.    IVC/SVC: Normal venous pressure at 3 mmHg.      Results for orders placed during the hospital encounter of 08/15/24    Nuclear Stress Test    Interpretation Summary    The ECG portion of the study is negative for ischemia.    The patient reported no chest pain during the stress test.    There were no arrhythmias during stress.    The exercise capacity was average.      No results found for this or any previous visit.      Please document below the medical necessity for continuous telemetry monitoring or discontinue the  current order if appropriate.    Current rhythm from flowsheet:               Patient Active Problem List    Diagnosis Date Noted    Aortic atherosclerosis 07/10/2023     Asa and statin   asymptomatic       Benign prostatic hyperplasia with nocturia 07/27/2022     Follows with urology   stable      Family history of prostate cancer 07/27/2022 2022 brother diagnosed at 64 yo  Follows with urology       Chronic pain of right knee 02/02/2022     voltaren gel controls pain      Nuclear sclerosis of right eye 11/09/2020    Prediabetes 01/03/2020     Well controlled with weight loss      Bilateral hearing loss 01/03/2020     - evaluated by Audiology and pending hearing aids      PCT (porphyria cutanea tarda) 08/28/2019     Managed per derm       Essential hypertension 08/02/2018     Well controlled  Lisinopril/HCTZ 20/25  Asymptomatic       Non-rheumatic tricuspid valve insufficiency 04/11/2017     Mild tricuspid regurgitation. 08/2016. Echo   Follows with cards  asymptomatic       Coronary artery disease involving native coronary artery of native heart without angina pectoris 12/03/2014     Follows with cards  Statin and asa; max med therapy  Not able to tolerate BB 2/2 low heart rate       Pure hypercholesterolemia 10/14/2014     Well controlled   Lipitor 80   Of note LDL not to goal of under 70; may consider adding Zetia if continues to be elevated   Well tolerated       Mild intermittent asthma without complication 10/14/2014     PRN albuterol   only has issues with colds       GERD (gastroesophageal reflux disease) 10/14/2014     PPI PRN   Advised of long term use harm vs good  Vit D and Ca advised                       LAST HbA1c  Lab Results   Component Value Date    HGBA1C 5.6 07/05/2024       Lipid panel  Lab Results   Component Value Date    CHOL 137 07/05/2024    CHOL 136 07/03/2023    CHOL 127 07/22/2022     Lab Results   Component Value Date    HDL 44 07/05/2024    HDL 45 07/03/2023    HDL 42 07/22/2022      Lab Results   Component Value Date    LDLCALC 80.6 07/05/2024    LDLCALC 75.4 07/03/2023    LDLCALC 72.0 07/22/2022     Lab Results   Component Value Date    TRIG 62 07/05/2024    TRIG 78 07/03/2023    TRIG 65 07/22/2022     Lab Results   Component Value Date    CHOLHDL 32.1 07/05/2024    CHOLHDL 33.1 07/03/2023    CHOLHDL 33.1 07/22/2022            Review of Systems   Constitutional: Negative for chills and fever.   HENT:  Negative for hearing loss and nosebleeds.    Eyes:  Negative for blurred vision.   Cardiovascular:  Negative for chest pain, leg swelling and palpitations.   Respiratory:  Negative for hemoptysis and shortness of breath.    Hematologic/Lymphatic: Negative for bleeding problem.   Skin:  Negative for itching.   Musculoskeletal:  Negative for falls.   Gastrointestinal:  Negative for abdominal pain and hematochezia.   Genitourinary:  Negative for hematuria.   Neurological:  Negative for dizziness and loss of balance.   Psychiatric/Behavioral:  Negative for altered mental status and depression.        Objective:   Physical Exam  Constitutional:       Appearance: He is well-developed.   HENT:      Head: Normocephalic and atraumatic.   Eyes:      Conjunctiva/sclera: Conjunctivae normal.   Neck:      Vascular: No carotid bruit or JVD.   Cardiovascular:      Rate and Rhythm: Normal rate and regular rhythm.      Pulses:           Carotid pulses are 2+ on the right side and 2+ on the left side.       Radial pulses are 2+ on the right side and 2+ on the left side.      Heart sounds: Normal heart sounds. No murmur heard.     No friction rub. No gallop.   Pulmonary:      Effort: Pulmonary effort is normal. No respiratory distress.      Breath sounds: Normal breath sounds. No stridor. No wheezing.   Musculoskeletal:      Cervical back: Neck supple.   Skin:     General: Skin is warm and dry.   Neurological:      Mental Status: He is alert and oriented to person, place, and time.   Psychiatric:          Behavior: Behavior normal.       Assessment:     1. Hyperlipidemia, unspecified hyperlipidemia type        Plan:     -  physically very active without any symptoms concerning for possible angina.  We will continue risk factor modification.  Repeat lipid panel in 6 months.  Previously LDL of 80 continue Lipitor 80 mg and Zetia 10 mg daily.  -   Sleep study still pending.  Echocardiogram with normal systolic and diastolic function of the left ventricle.  RVSP of 39 mm Hg.  Euvolemic on examination.  Continue lisinopril/HCTZ.  -   Stress test negative for any reversible perfusion defect.  In the absence of any classical angina symptoms holding off of beta blocker therapy at this point.  = follow up in Cardiology Clinic with repeat lipid panel  Pertinent cardiac images and EKG reviewed independently.    Continue with current medical plan and lifestyle changes.  Return sooner for concerns or questions. If symptoms persist go to the ED  I have reviewed all pertinent data including patient's medical history in detail and updated the computerized patient record.     Orders Placed This Encounter   Procedures    Lipid Panel     fasting     Standing Status:   Future     Standing Expiration Date:   9/16/2025     Order Specific Question:   Send normal result to authorizing provider's In Basket if patient is active on MyChart:     Answer:   Yes       Follow up as scheduled.     He expressed verbal understanding and agreed with the plan    Patient's Medications   New Prescriptions    No medications on file   Previous Medications    ALBUTEROL (PROVENTIL/VENTOLIN HFA) 90 MCG/ACTUATION INHALER    Inhale 2 puffs into the lungs every 6 (six) hours as needed for Wheezing.    ASPIRIN 81 MG CHEW    Take 1 tablet (81 mg total) by mouth once daily.    ATORVASTATIN (LIPITOR) 80 MG TABLET    Take 1 tablet (80 mg total) by mouth every evening.    EZETIMIBE (ZETIA) 10 MG TABLET    Take 1 tablet (10 mg total) by mouth once daily.     LISINOPRIL-HYDROCHLOROTHIAZIDE (PRINZIDE,ZESTORETIC) 20-25 MG TAB    Take 1 tablet by mouth once daily.    METHYLPREDNISOLONE (MEDROL DOSEPACK) 4 MG TABLET    use as directed    MUPIROCIN (BACTROBAN) 2 % OINTMENT    Apply topically 2 (two) times daily. Apply inside nostril area (nasal vestibule) twice per day for 14 days    OMEPRAZOLE (PRILOSEC) 20 MG CAPSULE    Take 1 capsule (20 mg total) by mouth once daily.    ONDANSETRON (ZOFRAN-ODT) 4 MG TBDL    Dissolve 1 tablet (4 mg total) on the tongue every 6 hours as needed (nausea/vomiting).    OXYCODONE-ACETAMINOPHEN (PERCOCET) 5-325 MG PER TABLET    Take 1 tablet by mouth every 6 (six) hours as needed for Pain (refractory to over the counter pain medications). Note this medication contains tylenol/acetaminophen. Do not exceed >3000mg in 24hr period of tylenol.    SODIUM CHLORIDE (SALINE NASAL) 0.65 % NASAL SPRAY    2 sprays by Nasal route every 4 (four) hours. Every 4 hours while awake (starting postop day 1) apply to bilateral nasal cavities    TRIAMCINOLONE (NASACORT) 55 MCG NASAL INHALER    2 sprays by Nasal route 2 (two) times daily.   Modified Medications    No medications on file   Discontinued Medications    No medications on file        Nilson Pulido M.D

## 2024-09-18 ENCOUNTER — OFFICE VISIT (OUTPATIENT)
Dept: OTOLARYNGOLOGY | Facility: CLINIC | Age: 69
End: 2024-09-18
Payer: MEDICARE

## 2024-09-18 VITALS
HEART RATE: 46 BPM | SYSTOLIC BLOOD PRESSURE: 132 MMHG | DIASTOLIC BLOOD PRESSURE: 74 MMHG | WEIGHT: 237.63 LBS | BODY MASS INDEX: 33.15 KG/M2

## 2024-09-18 DIAGNOSIS — Z98.890 S/P FESS (FUNCTIONAL ENDOSCOPIC SINUS SURGERY): Primary | ICD-10-CM

## 2024-09-18 PROCEDURE — 1159F MED LIST DOCD IN RCRD: CPT | Mod: CPTII,S$GLB,, | Performed by: OTOLARYNGOLOGY

## 2024-09-18 PROCEDURE — 3044F HG A1C LEVEL LT 7.0%: CPT | Mod: CPTII,S$GLB,, | Performed by: OTOLARYNGOLOGY

## 2024-09-18 PROCEDURE — 3075F SYST BP GE 130 - 139MM HG: CPT | Mod: CPTII,S$GLB,, | Performed by: OTOLARYNGOLOGY

## 2024-09-18 PROCEDURE — 99999 PR PBB SHADOW E&M-EST. PATIENT-LVL III: CPT | Mod: PBBFAC,,, | Performed by: OTOLARYNGOLOGY

## 2024-09-18 PROCEDURE — 3288F FALL RISK ASSESSMENT DOCD: CPT | Mod: CPTII,S$GLB,, | Performed by: OTOLARYNGOLOGY

## 2024-09-18 PROCEDURE — 1126F AMNT PAIN NOTED NONE PRSNT: CPT | Mod: CPTII,S$GLB,, | Performed by: OTOLARYNGOLOGY

## 2024-09-18 PROCEDURE — 1101F PT FALLS ASSESS-DOCD LE1/YR: CPT | Mod: CPTII,S$GLB,, | Performed by: OTOLARYNGOLOGY

## 2024-09-18 PROCEDURE — 31237 NSL/SINS NDSC SURG BX POLYPC: CPT | Mod: 79,50,S$GLB, | Performed by: OTOLARYNGOLOGY

## 2024-09-18 PROCEDURE — 3078F DIAST BP <80 MM HG: CPT | Mod: CPTII,S$GLB,, | Performed by: OTOLARYNGOLOGY

## 2024-09-18 PROCEDURE — 1160F RVW MEDS BY RX/DR IN RCRD: CPT | Mod: CPTII,S$GLB,, | Performed by: OTOLARYNGOLOGY

## 2024-09-18 PROCEDURE — 99024 POSTOP FOLLOW-UP VISIT: CPT | Mod: S$GLB,,, | Performed by: OTOLARYNGOLOGY

## 2024-09-18 PROCEDURE — 4010F ACE/ARB THERAPY RXD/TAKEN: CPT | Mod: CPTII,S$GLB,, | Performed by: OTOLARYNGOLOGY

## 2024-09-18 NOTE — PROGRESS NOTES
Two weeks S/P Medtronic FESS with bilateral maxillaries,ethmoids,frontals,sphenoids septoplasty,SMR turbs.  He is doing well with his rinses twice daily and with good reported nasal airway.    Exam with scope # 100LDN and bilateral debris,crusting,and devitalized tissue removed, along with all of stents.  His ostia are open and healing well with no further crusting.  Plan:  Continue compounded rinses twice daily and transition to once daily with budesonide only after these are completed.  Saline spray/gel  RTC 2 months or p.r.n. sooner.

## 2024-11-18 ENCOUNTER — OFFICE VISIT (OUTPATIENT)
Dept: OTOLARYNGOLOGY | Facility: CLINIC | Age: 69
End: 2024-11-18
Payer: MEDICARE

## 2024-11-18 VITALS
DIASTOLIC BLOOD PRESSURE: 79 MMHG | BODY MASS INDEX: 33.33 KG/M2 | SYSTOLIC BLOOD PRESSURE: 145 MMHG | WEIGHT: 239 LBS | HEART RATE: 61 BPM

## 2024-11-18 DIAGNOSIS — Z98.890 S/P FESS (FUNCTIONAL ENDOSCOPIC SINUS SURGERY): Primary | ICD-10-CM

## 2024-11-18 PROCEDURE — 1160F RVW MEDS BY RX/DR IN RCRD: CPT | Mod: CPTII,S$GLB,, | Performed by: OTOLARYNGOLOGY

## 2024-11-18 PROCEDURE — 1159F MED LIST DOCD IN RCRD: CPT | Mod: CPTII,S$GLB,, | Performed by: OTOLARYNGOLOGY

## 2024-11-18 PROCEDURE — 31231 NASAL ENDOSCOPY DX: CPT | Mod: 79,S$GLB,, | Performed by: OTOLARYNGOLOGY

## 2024-11-18 PROCEDURE — 99999 PR PBB SHADOW E&M-EST. PATIENT-LVL I: CPT | Mod: PBBFAC,,, | Performed by: OTOLARYNGOLOGY

## 2024-11-18 PROCEDURE — 4010F ACE/ARB THERAPY RXD/TAKEN: CPT | Mod: CPTII,S$GLB,, | Performed by: OTOLARYNGOLOGY

## 2024-11-18 PROCEDURE — 3044F HG A1C LEVEL LT 7.0%: CPT | Mod: CPTII,S$GLB,, | Performed by: OTOLARYNGOLOGY

## 2024-11-18 PROCEDURE — 99024 POSTOP FOLLOW-UP VISIT: CPT | Mod: S$GLB,,, | Performed by: OTOLARYNGOLOGY

## 2024-11-18 NOTE — PROGRESS NOTES
Three months S/P Medtronic FESS with bilateral maxillaries,ethmoids,frontals,sphenoids septoplasty,SMR turbs.  He is doing well with no sinus issues and good reported nasal airway.    Exam with scope # [id number] shows his ostia to be open and well healed.  Plan:  Continue compounded rinses daily with budesonide saline sinus rinses.  Saline spray/gel  RTC 9 months or p.r.n. sooner.

## 2025-02-20 ENCOUNTER — TELEPHONE (OUTPATIENT)
Dept: CARDIOLOGY | Facility: CLINIC | Age: 70
End: 2025-02-20
Payer: MEDICARE

## 2025-03-06 ENCOUNTER — OFFICE VISIT (OUTPATIENT)
Dept: CARDIOLOGY | Facility: CLINIC | Age: 70
End: 2025-03-06
Payer: MEDICARE

## 2025-03-06 ENCOUNTER — RESULTS FOLLOW-UP (OUTPATIENT)
Dept: CARDIOLOGY | Facility: CLINIC | Age: 70
End: 2025-03-06

## 2025-03-06 VITALS
HEART RATE: 51 BPM | DIASTOLIC BLOOD PRESSURE: 65 MMHG | WEIGHT: 237.19 LBS | BODY MASS INDEX: 33.21 KG/M2 | HEIGHT: 71 IN | SYSTOLIC BLOOD PRESSURE: 110 MMHG | OXYGEN SATURATION: 96 %

## 2025-03-06 DIAGNOSIS — I70.0 AORTIC ATHEROSCLEROSIS: ICD-10-CM

## 2025-03-06 DIAGNOSIS — K21.9 GASTROESOPHAGEAL REFLUX DISEASE, UNSPECIFIED WHETHER ESOPHAGITIS PRESENT: ICD-10-CM

## 2025-03-06 DIAGNOSIS — I25.10 CORONARY ARTERY DISEASE INVOLVING NATIVE CORONARY ARTERY OF NATIVE HEART WITHOUT ANGINA PECTORIS: ICD-10-CM

## 2025-03-06 DIAGNOSIS — I10 HYPERTENSION, UNSPECIFIED TYPE: ICD-10-CM

## 2025-03-06 DIAGNOSIS — E78.00 PURE HYPERCHOLESTEROLEMIA: ICD-10-CM

## 2025-03-06 DIAGNOSIS — E78.5 HYPERLIPIDEMIA, UNSPECIFIED HYPERLIPIDEMIA TYPE: Primary | ICD-10-CM

## 2025-03-06 PROCEDURE — 4010F ACE/ARB THERAPY RXD/TAKEN: CPT | Mod: CPTII,S$GLB,, | Performed by: INTERNAL MEDICINE

## 2025-03-06 PROCEDURE — 3078F DIAST BP <80 MM HG: CPT | Mod: CPTII,S$GLB,, | Performed by: INTERNAL MEDICINE

## 2025-03-06 PROCEDURE — 99999 PR PBB SHADOW E&M-EST. PATIENT-LVL II: CPT | Mod: PBBFAC,,, | Performed by: INTERNAL MEDICINE

## 2025-03-06 PROCEDURE — G2211 COMPLEX E/M VISIT ADD ON: HCPCS | Mod: S$GLB,,, | Performed by: INTERNAL MEDICINE

## 2025-03-06 PROCEDURE — 1126F AMNT PAIN NOTED NONE PRSNT: CPT | Mod: CPTII,S$GLB,, | Performed by: INTERNAL MEDICINE

## 2025-03-06 PROCEDURE — 99214 OFFICE O/P EST MOD 30 MIN: CPT | Mod: S$GLB,,, | Performed by: INTERNAL MEDICINE

## 2025-03-06 PROCEDURE — 3074F SYST BP LT 130 MM HG: CPT | Mod: CPTII,S$GLB,, | Performed by: INTERNAL MEDICINE

## 2025-03-06 PROCEDURE — 3288F FALL RISK ASSESSMENT DOCD: CPT | Mod: CPTII,S$GLB,, | Performed by: INTERNAL MEDICINE

## 2025-03-06 PROCEDURE — 3008F BODY MASS INDEX DOCD: CPT | Mod: CPTII,S$GLB,, | Performed by: INTERNAL MEDICINE

## 2025-03-06 PROCEDURE — 1101F PT FALLS ASSESS-DOCD LE1/YR: CPT | Mod: CPTII,S$GLB,, | Performed by: INTERNAL MEDICINE

## 2025-03-24 DIAGNOSIS — Z00.00 ENCOUNTER FOR MEDICARE ANNUAL WELLNESS EXAM: ICD-10-CM

## 2025-05-16 ENCOUNTER — OFFICE VISIT (OUTPATIENT)
Dept: FAMILY MEDICINE | Facility: CLINIC | Age: 70
End: 2025-05-16
Payer: MEDICARE

## 2025-05-16 VITALS
SYSTOLIC BLOOD PRESSURE: 119 MMHG | DIASTOLIC BLOOD PRESSURE: 60 MMHG | BODY MASS INDEX: 33.7 KG/M2 | OXYGEN SATURATION: 95 % | HEIGHT: 71 IN | WEIGHT: 240.75 LBS | HEART RATE: 81 BPM

## 2025-05-16 DIAGNOSIS — H91.93 BILATERAL HEARING LOSS, UNSPECIFIED HEARING LOSS TYPE: ICD-10-CM

## 2025-05-16 DIAGNOSIS — I10 ESSENTIAL HYPERTENSION: ICD-10-CM

## 2025-05-16 DIAGNOSIS — E80.1 PCT (PORPHYRIA CUTANEA TARDA): ICD-10-CM

## 2025-05-16 DIAGNOSIS — E78.00 PURE HYPERCHOLESTEROLEMIA: ICD-10-CM

## 2025-05-16 DIAGNOSIS — N40.1 BENIGN PROSTATIC HYPERPLASIA WITH NOCTURIA: ICD-10-CM

## 2025-05-16 DIAGNOSIS — R35.1 BENIGN PROSTATIC HYPERPLASIA WITH NOCTURIA: ICD-10-CM

## 2025-05-16 DIAGNOSIS — J45.20 MILD INTERMITTENT ASTHMA WITHOUT COMPLICATION: ICD-10-CM

## 2025-05-16 DIAGNOSIS — I27.20 PULMONARY HYPERTENSION: ICD-10-CM

## 2025-05-16 DIAGNOSIS — K21.00 GASTROESOPHAGEAL REFLUX DISEASE WITH ESOPHAGITIS WITHOUT HEMORRHAGE: Chronic | ICD-10-CM

## 2025-05-16 DIAGNOSIS — I70.0 AORTIC ATHEROSCLEROSIS: ICD-10-CM

## 2025-05-16 DIAGNOSIS — H25.11 NUCLEAR SCLEROSIS OF RIGHT EYE: ICD-10-CM

## 2025-05-16 DIAGNOSIS — R73.03 PREDIABETES: ICD-10-CM

## 2025-05-16 DIAGNOSIS — Z00.00 ENCOUNTER FOR MEDICARE ANNUAL WELLNESS EXAM: Primary | ICD-10-CM

## 2025-05-16 DIAGNOSIS — I25.10 CORONARY ARTERY DISEASE INVOLVING NATIVE CORONARY ARTERY OF NATIVE HEART WITHOUT ANGINA PECTORIS: ICD-10-CM

## 2025-05-16 DIAGNOSIS — I36.1 NON-RHEUMATIC TRICUSPID VALVE INSUFFICIENCY: ICD-10-CM

## 2025-05-16 PROCEDURE — 1158F ADVNC CARE PLAN TLK DOCD: CPT | Mod: CPTII,S$GLB,,

## 2025-05-16 PROCEDURE — 3074F SYST BP LT 130 MM HG: CPT | Mod: CPTII,S$GLB,,

## 2025-05-16 PROCEDURE — 1101F PT FALLS ASSESS-DOCD LE1/YR: CPT | Mod: CPTII,S$GLB,,

## 2025-05-16 PROCEDURE — G0439 PPPS, SUBSEQ VISIT: HCPCS | Mod: S$GLB,,,

## 2025-05-16 PROCEDURE — 3078F DIAST BP <80 MM HG: CPT | Mod: CPTII,S$GLB,,

## 2025-05-16 PROCEDURE — 1160F RVW MEDS BY RX/DR IN RCRD: CPT | Mod: CPTII,S$GLB,,

## 2025-05-16 PROCEDURE — 99999 PR PBB SHADOW E&M-EST. PATIENT-LVL IV: CPT | Mod: PBBFAC,,,

## 2025-05-16 PROCEDURE — 1159F MED LIST DOCD IN RCRD: CPT | Mod: CPTII,S$GLB,,

## 2025-05-16 PROCEDURE — 3288F FALL RISK ASSESSMENT DOCD: CPT | Mod: CPTII,S$GLB,,

## 2025-05-16 PROCEDURE — 1126F AMNT PAIN NOTED NONE PRSNT: CPT | Mod: CPTII,S$GLB,,

## 2025-05-16 PROCEDURE — 4010F ACE/ARB THERAPY RXD/TAKEN: CPT | Mod: CPTII,S$GLB,,

## 2025-05-16 RX ORDER — MULTIVITAMIN
1 TABLET ORAL DAILY
COMMUNITY

## 2025-05-16 NOTE — PROGRESS NOTES
"      Jean Shaw presented for a  Medicare AWV and comprehensive Health Risk Assessment today. The following components were reviewed and updated:    Medical history  Family History  Social history  Allergies and Current Medications  Health Risk Assessment  Health Maintenance  Care Team         ** See Completed Assessments for Annual Wellness Visit within the encounter summary.**         The following assessments were completed:  Living Situation  CAGE  Depression Screening  Timed Get Up and Go  Whisper Test  Cognitive Function Screening    Nutrition Screening  ADL Screening  PAQ Screening      Opioid documentation:      Patient does not have a current opioid prescription.        Vitals:    05/16/25 1304   BP: 119/60   BP Location: Left arm   Patient Position: Sitting   Pulse: 81   SpO2: 95%   Weight: 109.2 kg (240 lb 11.9 oz)   Height: 5' 11" (1.803 m)     Body mass index is 33.58 kg/m².  Physical Exam  Vitals reviewed.   Constitutional:       Appearance: Normal appearance. He is well-developed.   HENT:      Head: Normocephalic.      Ears:      Comments: Wearing bilateral hearing aids  Cardiovascular:      Rate and Rhythm: Normal rate and regular rhythm.      Pulses: Normal pulses.      Heart sounds: Normal heart sounds. No murmur heard.  Pulmonary:      Effort: Pulmonary effort is normal. No respiratory distress.      Breath sounds: No wheezing, rhonchi or rales.   Skin:     General: Skin is warm.   Neurological:      General: No focal deficit present.      Mental Status: He is alert and oriented to person, place, and time.   Psychiatric:         Behavior: Behavior is cooperative.               Diagnoses and health risks identified today and associated recommendations/orders:    1. Encounter for Medicare annual wellness exam  Screenings performed, as noted above. Personal preventative testing needs reviewed.   - Referral to Enhanced Annual Wellness Visit (eAWV) W+1    2. PCT (porphyria cutanea tarda)  Chronic. " Stable. Followed by PCP.     3. Pulmonary hypertension  PA pressure on most recent echo. Followed by Cardiology.     4. Aortic atherosclerosis  5. Pure hypercholesterolemia  Chronic. Stable with atorvastatin, zetia. Followed by Cardiology.     6. Coronary artery disease involving native coronary artery of native heart without angina pectoris  Chronic. Stable with ASA, atorvastatin. Followed by Cardiology.     7. Non-rheumatic tricuspid valve insufficiency  Chronic. Stable. Followed by PCP.     8. Essential hypertension  Chronic. Stable with lisinopril-hctz. Followed by PCP.     9. Nuclear sclerosis of right eye  Chronic. Stable. Followed by Ophthalmology.     10. Bilateral hearing loss, unspecified hearing loss type  Chronic. Stable with bilateral hearing aids. Followed by ENT.     11. Mild intermittent asthma without complication  Chronic. Stable with albuterol prn. Followed by PCP.     12. Benign prostatic hyperplasia with nocturia  Chronic. Stable. Followed by Urology.     13. Prediabetes  Work on diet modification and increase in physical activity as tolerated.   Followed by PCP.     14. Gastroesophageal reflux disease with esophagitis without hemorrhage  Chronic. Stable with omeprazole. Followed by PCP.       Provided Jean with a 5-10 year written screening schedule and personal prevention plan. Recommendations were developed using the USPSTF age appropriate recommendations. Education, counseling, and referrals were provided as needed. After Visit Summary printed and given to patient which includes a list of additional screenings\tests needed.    Follow up in about 1 year (around 5/16/2026) for your next annual wellness visit.    Magdalena Randall NP      Advance Care Planning     I offered to discuss advanced care planning, including how to pick a person who would make decisions for you if you were unable to make them for yourself, called a health care power of , and what kind of decisions you might  make such as use of life sustaining treatments such as ventilators and tube feeding when faced with a life limiting illness recorded on a living will that they will need to know. (How you want to be cared for as you near the end of your natural life)     X Patient is interested in learning more about how to make advanced directives.  I provided them paperwork and offered to discuss this with them.

## 2025-05-16 NOTE — PATIENT INSTRUCTIONS
[phone] [employer] phone #     Counseling and Referral of Other Preventative  (Italic type indicates deductible and co-insurance are waived)    Patient Name: Jean Shaw  Today's Date: 5/16/2025    Health Maintenance         Date Due Completion Date    RSV Vaccine (Age 60+ and Pregnant patients) (1 - Risk 60-74 years 1-dose series) Never done ---    Hemoglobin A1c (Prediabetes) 07/05/2025 7/5/2024    Pneumococcal Vaccines (Age 50+) (3 of 3 - PCV20 or PCV21) 07/09/2025 7/9/2020    PROSTATE-SPECIFIC ANTIGEN 08/07/2025 8/7/2024    High Dose Statin 11/18/2025 11/18/2024    Aspirin/Antiplatelet Therapy 11/18/2025 11/18/2024    Lipid Panel 03/06/2026 3/6/2025    TETANUS VACCINE 05/09/2026 5/9/2016    Colorectal Cancer Screening 08/01/2027 8/1/2024          No orders of the defined types were placed in this encounter.      The following information is provided to all patients.  This information is to help you find resources for any of the problems found today that may be affecting your health:                  Living healthy guide: www.[facility].[place].gov      Understanding Diabetes: www.diabetes.org      Eating healthy: www.cdc.gov/healthyweight      CDC home safety checklist: www.cdc.gov/steadi/patient.html      Agency on Aging: www.goea.[place].[facility]      Alcoholics anonymous (AA): www.aa.org      Physical Activity: www.ange.nih.gov/rj1aucp      Tobacco use: www.quitwithusla.org

## 2025-05-19 PROBLEM — I27.20 PULMONARY HYPERTENSION: Status: ACTIVE | Noted: 2025-05-19

## 2025-07-14 ENCOUNTER — TELEPHONE (OUTPATIENT)
Dept: FAMILY MEDICINE | Facility: CLINIC | Age: 70
End: 2025-07-14
Payer: MEDICARE

## 2025-07-14 ENCOUNTER — OFFICE VISIT (OUTPATIENT)
Dept: FAMILY MEDICINE | Facility: CLINIC | Age: 70
End: 2025-07-14
Payer: MEDICARE

## 2025-07-14 VITALS
DIASTOLIC BLOOD PRESSURE: 64 MMHG | TEMPERATURE: 99 F | BODY MASS INDEX: 33.91 KG/M2 | SYSTOLIC BLOOD PRESSURE: 118 MMHG | HEART RATE: 70 BPM | OXYGEN SATURATION: 96 % | WEIGHT: 242.19 LBS | HEIGHT: 71 IN

## 2025-07-14 DIAGNOSIS — I27.20 PULMONARY HYPERTENSION: ICD-10-CM

## 2025-07-14 DIAGNOSIS — E78.49 OTHER HYPERLIPIDEMIA: ICD-10-CM

## 2025-07-14 DIAGNOSIS — R73.03 PREDIABETES: ICD-10-CM

## 2025-07-14 DIAGNOSIS — R35.1 BENIGN PROSTATIC HYPERPLASIA WITH NOCTURIA: ICD-10-CM

## 2025-07-14 DIAGNOSIS — N40.1 BENIGN PROSTATIC HYPERPLASIA WITH NOCTURIA: ICD-10-CM

## 2025-07-14 DIAGNOSIS — Z23 NEED FOR PNEUMOCOCCAL VACCINE: ICD-10-CM

## 2025-07-14 DIAGNOSIS — E78.00 PURE HYPERCHOLESTEROLEMIA: Primary | ICD-10-CM

## 2025-07-14 DIAGNOSIS — K21.9 GASTROESOPHAGEAL REFLUX DISEASE WITHOUT ESOPHAGITIS: Chronic | ICD-10-CM

## 2025-07-14 DIAGNOSIS — J45.20 MILD INTERMITTENT ASTHMA WITHOUT COMPLICATION: ICD-10-CM

## 2025-07-14 DIAGNOSIS — I70.0 AORTIC ATHEROSCLEROSIS: ICD-10-CM

## 2025-07-14 DIAGNOSIS — I10 ESSENTIAL HYPERTENSION: ICD-10-CM

## 2025-07-14 DIAGNOSIS — E80.1 PCT (PORPHYRIA CUTANEA TARDA): ICD-10-CM

## 2025-07-14 DIAGNOSIS — J45.21 MILD INTERMITTENT ASTHMA WITH ACUTE EXACERBATION: ICD-10-CM

## 2025-07-14 PROCEDURE — G2211 COMPLEX E/M VISIT ADD ON: HCPCS | Mod: S$GLB,,, | Performed by: STUDENT IN AN ORGANIZED HEALTH CARE EDUCATION/TRAINING PROGRAM

## 2025-07-14 PROCEDURE — 4010F ACE/ARB THERAPY RXD/TAKEN: CPT | Mod: CPTII,S$GLB,, | Performed by: STUDENT IN AN ORGANIZED HEALTH CARE EDUCATION/TRAINING PROGRAM

## 2025-07-14 PROCEDURE — 1101F PT FALLS ASSESS-DOCD LE1/YR: CPT | Mod: CPTII,S$GLB,, | Performed by: STUDENT IN AN ORGANIZED HEALTH CARE EDUCATION/TRAINING PROGRAM

## 2025-07-14 PROCEDURE — 90677 PCV20 VACCINE IM: CPT | Mod: S$GLB,,, | Performed by: STUDENT IN AN ORGANIZED HEALTH CARE EDUCATION/TRAINING PROGRAM

## 2025-07-14 PROCEDURE — 1159F MED LIST DOCD IN RCRD: CPT | Mod: CPTII,S$GLB,, | Performed by: STUDENT IN AN ORGANIZED HEALTH CARE EDUCATION/TRAINING PROGRAM

## 2025-07-14 PROCEDURE — 1126F AMNT PAIN NOTED NONE PRSNT: CPT | Mod: CPTII,S$GLB,, | Performed by: STUDENT IN AN ORGANIZED HEALTH CARE EDUCATION/TRAINING PROGRAM

## 2025-07-14 PROCEDURE — 3074F SYST BP LT 130 MM HG: CPT | Mod: CPTII,S$GLB,, | Performed by: STUDENT IN AN ORGANIZED HEALTH CARE EDUCATION/TRAINING PROGRAM

## 2025-07-14 PROCEDURE — 99215 OFFICE O/P EST HI 40 MIN: CPT | Mod: S$GLB,,, | Performed by: STUDENT IN AN ORGANIZED HEALTH CARE EDUCATION/TRAINING PROGRAM

## 2025-07-14 PROCEDURE — 99999 PR PBB SHADOW E&M-EST. PATIENT-LVL IV: CPT | Mod: PBBFAC,,, | Performed by: STUDENT IN AN ORGANIZED HEALTH CARE EDUCATION/TRAINING PROGRAM

## 2025-07-14 PROCEDURE — 3008F BODY MASS INDEX DOCD: CPT | Mod: CPTII,S$GLB,, | Performed by: STUDENT IN AN ORGANIZED HEALTH CARE EDUCATION/TRAINING PROGRAM

## 2025-07-14 PROCEDURE — 3078F DIAST BP <80 MM HG: CPT | Mod: CPTII,S$GLB,, | Performed by: STUDENT IN AN ORGANIZED HEALTH CARE EDUCATION/TRAINING PROGRAM

## 2025-07-14 PROCEDURE — 3288F FALL RISK ASSESSMENT DOCD: CPT | Mod: CPTII,S$GLB,, | Performed by: STUDENT IN AN ORGANIZED HEALTH CARE EDUCATION/TRAINING PROGRAM

## 2025-07-14 PROCEDURE — G0009 ADMIN PNEUMOCOCCAL VACCINE: HCPCS | Mod: S$GLB,,, | Performed by: STUDENT IN AN ORGANIZED HEALTH CARE EDUCATION/TRAINING PROGRAM

## 2025-07-14 RX ORDER — ATORVASTATIN CALCIUM 80 MG/1
80 TABLET, FILM COATED ORAL NIGHTLY
Qty: 90 TABLET | Refills: 3 | Status: SHIPPED | OUTPATIENT
Start: 2025-07-14

## 2025-07-14 RX ORDER — OMEPRAZOLE 20 MG/1
20 CAPSULE, DELAYED RELEASE ORAL DAILY
Qty: 90 CAPSULE | Refills: 3 | Status: SHIPPED | OUTPATIENT
Start: 2025-07-14

## 2025-07-14 RX ORDER — EZETIMIBE 10 MG/1
10 TABLET ORAL DAILY
Qty: 90 TABLET | Refills: 3 | Status: SHIPPED | OUTPATIENT
Start: 2025-07-14 | End: 2026-07-14

## 2025-07-14 RX ORDER — ALBUTEROL SULFATE 90 UG/1
2 INHALANT RESPIRATORY (INHALATION) EVERY 6 HOURS PRN
Qty: 18 G | Refills: 3 | Status: SHIPPED | OUTPATIENT
Start: 2025-07-14 | End: 2026-07-14

## 2025-07-14 NOTE — TELEPHONE ENCOUNTER
Copied from CRM #[id number]. Topic: General Inquiry - Patient Advice  >> Jul 14, 2025  7:58 AM Kiah wrote:  Patient is returning a phone call.    Who left a message for the patient: Analy Meza, DO    Does patient know what this is regarding:  yes    Would you like a call back, or a response through your [facility] portal?:   phone    Best call back number: [phone]    Comments:  patient stated that he can be there early as he was told

## 2025-07-14 NOTE — TELEPHONE ENCOUNTER
Copied from CRM #[id number]. Topic: General Inquiry - Patient Advice  >> Jul 14, 2025  7:58 AM Kiah wrote:  Patient is returning a phone call.    Who left a message for the patient: Analy Meza, DO    Does patient know what this is regarding:  yes    Would you like a call back, or a response through your [facility] portal?:   phone    Best call back number: [phone]    Comments:  patient stated that he can be there early as he was told

## 2025-07-14 NOTE — TELEPHONE ENCOUNTER
Called and spoke with pt in regards of his 3:00 pm apt for today.  Called to see if pt can come early for his apt. Pt informed me that he can be here for 2:00 pm today.

## 2025-07-14 NOTE — PROGRESS NOTES
Subjective:      Patient ID: Jean Shaw Jr. is a 70 y.o. male.    Chief Complaint: Annual Exam    History of Present Illness    CHIEF COMPLAINT:  Patient presents today for annual follow up visit.    SURGICAL HISTORY:  He underwent sinus surgery last year with successful outcomes. He completed initial post-surgical follow-up visits with one additional follow-up visit expected, potentially in August, pending scheduling call from the surgical team.    MEDICATIONS:  He is currently taking post-surgical medication prescribed by his surgeon, though not adhering to the original twice-weekly dosing recommendation.    IMMUNIZATIONS:  He received the shingles vaccine at a local pharmacy.      ROS:  General: -fever, -chills, -fatigue, -weight gain, -weight loss  Eyes: -vision changes, -redness, -discharge  ENT: -ear pain, -nasal congestion, -sore throat  Cardiovascular: -chest pain, -palpitations, -lower extremity edema  Respiratory: -cough, -shortness of breath  Gastrointestinal: -abdominal pain, -nausea, -vomiting, -diarrhea, -constipation, -blood in stool  Genitourinary: -dysuria, -hematuria, -frequency  Musculoskeletal: -joint pain, -muscle pain  Skin: -rash, -lesion  Neurological: -headache, -dizziness, -numbness, -tingling  Psychiatric: -anxiety, -depression, -sleep difficulty          Objective:     Vitals:    07/14/25 1405   BP: 118/64   Pulse: 70   Temp: 98.6 °F (37 °C)      Physical Exam  Vitals reviewed.   Constitutional:       Appearance: Normal appearance. He is obese.   HENT:      Head: Normocephalic and atraumatic.   Eyes:      Conjunctiva/sclera: Conjunctivae normal.   Cardiovascular:      Rate and Rhythm: Normal rate and regular rhythm.      Heart sounds: Normal heart sounds.   Pulmonary:      Effort: Pulmonary effort is normal.      Breath sounds: Normal breath sounds.   Abdominal:      Palpations: Abdomen is soft.      Tenderness: There is no abdominal tenderness.   Musculoskeletal:         General:  Normal range of motion.      Cervical back: Normal range of motion.      Right lower leg: No edema.      Left lower leg: No edema.   Neurological:      Mental Status: He is alert. Mental status is at baseline.   Psychiatric:         Mood and Affect: Mood normal.         Behavior: Behavior normal.        Assessment:         1. Pure hypercholesterolemia    2. Mild intermittent asthma with acute exacerbation    3. Other hyperlipidemia    4. Gastroesophageal reflux disease without esophagitis    5. Mild intermittent asthma without complication    6. Aortic atherosclerosis    7. Essential hypertension    8. Pulmonary hypertension    9. Benign prostatic hyperplasia with nocturia    10. Prediabetes    11. PCT (porphyria cutanea tarda)    12. Need for pneumococcal vaccine          Plan:   Assessment & Plan      IMMUNIZATION:  - Evaluated vaccination status, identifying need for pneumonia and RSV vaccines.  - Administered pneumonia vaccine today.  - Explained that RSV (RSV) typically affects infants, causing wheezing and potential hospitalization, and discussed availability of new RSV vaccine.  - Patient instructed to obtain RSV vaccine at pharmacy after 1 week.  - All administered vaccines documented in patient's immunization record.    GENERAL HEALTH CHECK-UP:  - Conducted annual follow-up with BP within acceptable range.  - Determined need for updated labs (excluding cholesterol which was recently checked).  - Ordered labs to be completed after today's visit; fasting not required.    FOLLOW-UP:  - Follow up in 6 months.  - Patient advised to contact office if any concerning lab results are found.        Problem List Items Addressed This Visit          Pulmonary    Mild intermittent asthma without complication    Overview   PRN albuterol   only has issues with colds          Relevant Medications    albuterol (PROVENTIL/VENTOLIN HFA) 90 mcg/actuation inhaler       Cardiac/Vascular    Pure hypercholesterolemia - Primary     Overview   Well controlled   Lipitor 80   Of note LDL not to goal of under 70; may consider adding Zetia if continues to be elevated   Well tolerated          Relevant Medications    atorvastatin (LIPITOR) 80 MG tablet    Pulmonary hypertension    Overview   PA pressure 39 on most recent echo. Followed by Cardiology.          Essential hypertension    Overview   Well controlled  Lisinopril/HCTZ 20/25  Asymptomatic          Aortic atherosclerosis    Overview   Asa and statin   asymptomatic             Renal/    Benign prostatic hyperplasia with nocturia    Overview   Follows with urology   stable            Endocrine    Prediabetes    Overview   Well controlled with weight loss         PCT (porphyria cutanea tarda)    Overview   Managed per derm             GI    GERD (gastroesophageal reflux disease) (Chronic)    Overview   PPI PRN   Advised of long term use harm vs good  Vit D and Ca advised          Relevant Medications    omeprazole (PRILOSEC) 20 MG capsule     Other Visit Diagnoses         Mild intermittent asthma with acute exacerbation        Relevant Medications    albuterol (PROVENTIL/VENTOLIN HFA) 90 mcg/actuation inhaler      Other hyperlipidemia          Need for pneumococcal vaccine        Relevant Medications    pneumoc 20-shelly conj-dip cr(PF) (PREVNAR-20 (PF)) injection Syrg 0.5 mL                    Analy Meza   [facility]   7/14/25       This note was generated with the assistance of ambient listening technology. Verbal consent was obtained by the patient and accompanying visitor(s) for the recording of patient appointment to facilitate this note. I attest to having reviewed and edited the generated note for accuracy, though some syntax or spelling errors may persist. Please contact the author of this note for any clarification.     I spent a total of 41 minutes on the day of the visit.This includes face to face time and non-face to face time preparing to see the patient (eg,  review of tests), obtaining and/or reviewing separately obtained history, documenting clinical information in the electronic or other health record, independently interpreting results and communicating results to the patient/family/caregiver, or care coordinator.

## 2025-07-14 NOTE — TELEPHONE ENCOUNTER
Copied from CRM #[id number]. Topic: General Inquiry - Patient Advice  >> Jul 14, 2025  7:58 AM Kiah wrote:  Patient is returning a phone call.    Who left a message for the patient: Analy Meza, DO    Does patient know what this is regarding:  yes    Would you like a call back, or a response through your [facility] portal?:   phone    Best call back number: [phone]    Comments:  patient stated that he can be there early as he was told

## 2025-08-08 ENCOUNTER — OFFICE VISIT (OUTPATIENT)
Dept: UROLOGY | Facility: CLINIC | Age: 70
End: 2025-08-08
Payer: MEDICARE

## 2025-08-08 ENCOUNTER — TELEPHONE (OUTPATIENT)
Dept: UROLOGY | Facility: CLINIC | Age: 70
End: 2025-08-08

## 2025-08-08 VITALS
OXYGEN SATURATION: 98 % | BODY MASS INDEX: 33.52 KG/M2 | DIASTOLIC BLOOD PRESSURE: 76 MMHG | WEIGHT: 240.31 LBS | HEART RATE: 60 BPM | SYSTOLIC BLOOD PRESSURE: 131 MMHG

## 2025-08-08 DIAGNOSIS — Z80.42 FAMILY HISTORY OF PROSTATE CANCER: ICD-10-CM

## 2025-08-08 DIAGNOSIS — N40.1 BENIGN PROSTATIC HYPERPLASIA WITH URINARY FREQUENCY: ICD-10-CM

## 2025-08-08 DIAGNOSIS — N40.1 BENIGN PROSTATIC HYPERPLASIA WITH NOCTURIA: Primary | ICD-10-CM

## 2025-08-08 DIAGNOSIS — Z80.42 FAMILY HISTORY OF PROSTATE CANCER: Primary | ICD-10-CM

## 2025-08-08 DIAGNOSIS — R35.0 BENIGN PROSTATIC HYPERPLASIA WITH URINARY FREQUENCY: ICD-10-CM

## 2025-08-08 DIAGNOSIS — R35.1 BENIGN PROSTATIC HYPERPLASIA WITH NOCTURIA: Primary | ICD-10-CM

## 2025-08-08 PROCEDURE — 99999 PR PBB SHADOW E&M-EST. PATIENT-LVL III: CPT | Mod: PBBFAC,,, | Performed by: UROLOGY

## 2025-08-08 NOTE — PROGRESS NOTES
Subjective:      Patient ID: Jean Shaw Jr. is a 70 y.o. male.    Chief Complaint: Annual Exam (Patient here today for annual exam no new complaints.)    Mr. Shaw is a 71 yo gentleman with a history of prostate cancer in the family.  His brother has been diagnosed and been followed with watchful waiting.  His PSA is at 3.6.   The patient here for follow-up for annual will obtain PSA notify patient of results.  The patient is not requiring any prostate medications at this time.  He is undergoing cardiac evaluation prior to sinus surgery.  Cardiologist recently added Zetia for cholesterol.  Patient does get up 2 times nightly based on how much water intake he has in his not having difficulty with daytime frequency or urgency.     Benign Prostatic Hypertrophy  This is a chronic problem. The current episode started more than 1 year ago. The problem is unchanged. Irritative symptoms include nocturia (x2). Irritative symptoms do not include frequency or urgency. Obstructive symptoms do not include dribbling, incomplete emptying, an intermittent stream, a slower stream, straining or a weak stream. Pertinent negatives include no chills, dysuria, genital pain, hematuria, hesitancy, nausea or vomiting. AUA score is 0-7. He is sexually active. The symptoms are aggravated by caffeine. Past treatments include nothing. The treatment provided significant relief.     Review of Systems   Constitutional:  Negative for activity change, appetite change, chills, diaphoresis, fatigue, fever and unexpected weight change.   HENT:  Negative for congestion, hearing loss, sinus pressure and trouble swallowing.    Eyes:  Negative for photophobia, pain, discharge and visual disturbance.   Respiratory:  Negative for apnea, cough and shortness of breath.    Cardiovascular:  Negative for chest pain, palpitations and leg swelling.   Gastrointestinal:  Negative for abdominal distention, abdominal pain, anal bleeding, blood in stool,  constipation, diarrhea, nausea, rectal pain and vomiting.   Endocrine: Negative for cold intolerance, heat intolerance, polydipsia, polyphagia and polyuria.   Genitourinary:  Positive for nocturia (x2). Negative for decreased urine volume, difficulty urinating, dysuria, enuresis, flank pain, frequency, genital sores, hematuria, hesitancy, incomplete emptying, penile discharge, penile pain, penile swelling, scrotal swelling, testicular pain and urgency.   Musculoskeletal:  Negative for arthralgias, back pain and myalgias.   Skin:  Negative for color change, pallor, rash and wound.   Allergic/Immunologic: Negative for environmental allergies, food allergies and immunocompromised state.   Neurological:  Negative for dizziness, seizures, weakness and headaches.   Hematological:  Negative for adenopathy. Does not bruise/bleed easily.   Psychiatric/Behavioral: Negative.        Objective:     Physical Exam  Vitals and nursing note reviewed.   Constitutional:       General: He is not in acute distress.     Appearance: Normal appearance. He is well-developed and normal weight. He is not ill-appearing, toxic-appearing or diaphoretic.   HENT:      Head: Normocephalic and atraumatic.      Right Ear: External ear normal.      Left Ear: External ear normal.      Nose: Nose normal.      Mouth/Throat:      Pharynx: No oropharyngeal exudate or posterior oropharyngeal erythema.   Eyes:      General: No scleral icterus.        Right eye: No discharge.         Left eye: No discharge.      Extraocular Movements: Extraocular movements intact.      Conjunctiva/sclera: Conjunctivae normal.      Pupils: Pupils are equal, round, and reactive to light.   Cardiovascular:      Rate and Rhythm: Normal rate and regular rhythm.      Heart sounds: Normal heart sounds.   Pulmonary:      Effort: Pulmonary effort is normal.   Abdominal:      General: Bowel sounds are normal. There is no distension.      Palpations: Abdomen is soft. There is no mass.       Tenderness: There is no abdominal tenderness. There is no right CVA tenderness, left CVA tenderness, guarding or rebound.      Hernia: No hernia is present.   Genitourinary:     Penis: Normal.       Testes: Normal.      Comments: Patient with no CVA tenderness, normal penis and scrotum.  Bladder nontender.  Musculoskeletal:         General: Normal range of motion.      Cervical back: Normal range of motion and neck supple.   Skin:     General: Skin is warm and dry.      Capillary Refill: Capillary refill takes 2 to 3 seconds.   Neurological:      Mental Status: He is alert and oriented to person, place, and time.      Cranial Nerves: No cranial nerve deficit.      Sensory: No sensory deficit.      Motor: No weakness.      Coordination: Coordination normal.      Gait: Gait normal.      Deep Tendon Reflexes: Reflexes are normal and symmetric. Reflexes normal.   Psychiatric:         Mood and Affect: Mood normal.         Behavior: Behavior normal.         Thought Content: Thought content normal.         Judgment: Judgment normal.        Assessment:      1. Benign prostatic hyperplasia with nocturia    2. Family history of prostate cancer      Plan:     Patient Instructions   F/U  - 1 year

## 2025-08-08 NOTE — TELEPHONE ENCOUNTER
----- Message from Patrice Hurd MD sent at 8/8/2025  3:13 PM CDT -----  PSA is normal at 3.0  ----- Message -----  From: Lab, Background User  Sent: 8/8/2025   3:12 PM CDT  To: Patrice Hurd MD

## (undated) DEVICE — SUT V-LOC ABSRB WOUND CLSR

## (undated) DEVICE — SYR ONLY LUER LOCK 20CC

## (undated) DEVICE — SHEATH CLN ENDOSCRB 4MM

## (undated) DEVICE — GLOVE BIOGEL SKINSENSE PI 6.5

## (undated) DEVICE — SEAL UNIVERSAL 5MM-8MM XI

## (undated) DEVICE — SUT V-LOC 90 UD GS22 2-0 9IN

## (undated) DEVICE — OBTURATOR BLADELESS 8MM XI CLR

## (undated) DEVICE — BLADE SURGICAL 15C

## (undated) DEVICE — DRAPE INSTR MAGNETIC 10X16IN

## (undated) DEVICE — BLADE TRICUT

## (undated) DEVICE — SUT MCRYL PLUS 4-0 PS2 27IN

## (undated) DEVICE — MARKER SKIN RULER STERILE

## (undated) DEVICE — DRAPE STERI INSTRUMENT 1018

## (undated) DEVICE — GLOVE SURGICAL LATEX SZ 8

## (undated) DEVICE — TUBING SUC UNIV W/CONN 12FT

## (undated) DEVICE — ELECTRODE REM PLYHSV RETURN 9

## (undated) DEVICE — DRAPE ARM DAVINCI XI

## (undated) DEVICE — NDL 22GA X1 1/2 REG BEVEL

## (undated) DEVICE — ADHESIVE DERMABOND ADVANCED

## (undated) DEVICE — SYR 30CC LUER LOCK

## (undated) DEVICE — TRACKER PATIENT NON INVASIVE

## (undated) DEVICE — GOWN NONREINF SET-IN SLV XL

## (undated) DEVICE — SOL NACL .9P 500ML

## (undated) DEVICE — GLOVE BIOGEL SKINSENSE PI 7.5

## (undated) DEVICE — SOL NORMAL USPCA 0.9%

## (undated) DEVICE — SUT VLOC 90 3-0 V-20 NDL 9

## (undated) DEVICE — SUT MONOCYRL 4-0 PS2 UND

## (undated) DEVICE — TRACKER ENT INSTRUMENT

## (undated) DEVICE — SOL BETADINE 5%

## (undated) DEVICE — KIT WING PAD POSITIONING

## (undated) DEVICE — CASSETTE INFINITI

## (undated) DEVICE — CONTAINER SPECIMEN OR STER 4OZ

## (undated) DEVICE — STAPLER SEPTAL ENTACT

## (undated) DEVICE — GLOVE BIOGEL ECLIPSE SZ 7.5

## (undated) DEVICE — DRAPE STERI-DRAPE 1000 17X11IN

## (undated) DEVICE — Device

## (undated) DEVICE — SEE MEDLINE ITEM 156952

## (undated) DEVICE — SYR 10CC LUER LOCK

## (undated) DEVICE — MANIFOLD 4 PORT

## (undated) DEVICE — COVER TIP CURVED SCISSORS XI

## (undated) DEVICE — SYR SLIP TIP 1CC

## (undated) DEVICE — CANNULA REDUCER 12-8MM

## (undated) DEVICE — DRESSING AQUACEL SACRAL 9 X 9